# Patient Record
Sex: FEMALE | Race: WHITE | NOT HISPANIC OR LATINO | Employment: FULL TIME | URBAN - METROPOLITAN AREA
[De-identification: names, ages, dates, MRNs, and addresses within clinical notes are randomized per-mention and may not be internally consistent; named-entity substitution may affect disease eponyms.]

---

## 2019-01-01 ENCOUNTER — TRANSFERRED RECORDS (OUTPATIENT)
Dept: MULTI SPECIALTY CLINIC | Facility: CLINIC | Age: 29
End: 2019-01-01

## 2019-01-01 LAB — PAP SMEAR - HIM PATIENT REPORTED: NEGATIVE

## 2019-04-25 ENCOUNTER — EMERGENCY (EMERGENCY)
Facility: HOSPITAL | Age: 29
LOS: 1 days | Discharge: HOME | End: 2019-04-25
Attending: EMERGENCY MEDICINE | Admitting: EMERGENCY MEDICINE
Payer: COMMERCIAL

## 2019-04-25 VITALS
RESPIRATION RATE: 18 BRPM | HEIGHT: 64 IN | HEART RATE: 79 BPM | SYSTOLIC BLOOD PRESSURE: 116 MMHG | DIASTOLIC BLOOD PRESSURE: 64 MMHG | TEMPERATURE: 99 F | WEIGHT: 148.59 LBS | OXYGEN SATURATION: 98 %

## 2019-04-25 VITALS
DIASTOLIC BLOOD PRESSURE: 75 MMHG | TEMPERATURE: 98 F | SYSTOLIC BLOOD PRESSURE: 111 MMHG | OXYGEN SATURATION: 98 % | HEART RATE: 72 BPM | RESPIRATION RATE: 18 BRPM

## 2019-04-25 DIAGNOSIS — Z3A.11 11 WEEKS GESTATION OF PREGNANCY: ICD-10-CM

## 2019-04-25 DIAGNOSIS — Z88.8 ALLERGY STATUS TO OTHER DRUGS, MEDICAMENTS AND BIOLOGICAL SUBSTANCES: ICD-10-CM

## 2019-04-25 DIAGNOSIS — O34.81 MATERNAL CARE FOR OTHER ABNORMALITIES OF PELVIC ORGANS, FIRST TRIMESTER: ICD-10-CM

## 2019-04-25 DIAGNOSIS — R35.0 FREQUENCY OF MICTURITION: ICD-10-CM

## 2019-04-25 DIAGNOSIS — Z98.1 ARTHRODESIS STATUS: Chronic | ICD-10-CM

## 2019-04-25 DIAGNOSIS — M54.9 DORSALGIA, UNSPECIFIED: ICD-10-CM

## 2019-04-25 LAB
ALBUMIN SERPL ELPH-MCNC: 4.5 G/DL — SIGNIFICANT CHANGE UP (ref 3.3–5)
ALP SERPL-CCNC: 49 U/L — SIGNIFICANT CHANGE UP (ref 40–120)
ALT FLD-CCNC: 21 U/L — SIGNIFICANT CHANGE UP (ref 10–45)
ANION GAP SERPL CALC-SCNC: 13 MMOL/L — SIGNIFICANT CHANGE UP (ref 5–17)
APPEARANCE UR: CLEAR — SIGNIFICANT CHANGE UP
AST SERPL-CCNC: 16 U/L — SIGNIFICANT CHANGE UP (ref 10–40)
BASOPHILS # BLD AUTO: 0.07 K/UL — SIGNIFICANT CHANGE UP (ref 0–0.2)
BASOPHILS NFR BLD AUTO: 0.7 % — SIGNIFICANT CHANGE UP (ref 0–2)
BILIRUB SERPL-MCNC: 0.3 MG/DL — SIGNIFICANT CHANGE UP (ref 0.2–1.2)
BILIRUB UR-MCNC: NEGATIVE — SIGNIFICANT CHANGE UP
BUN SERPL-MCNC: 6 MG/DL — LOW (ref 7–23)
CALCIUM SERPL-MCNC: 9.8 MG/DL — SIGNIFICANT CHANGE UP (ref 8.4–10.5)
CHLORIDE SERPL-SCNC: 102 MMOL/L — SIGNIFICANT CHANGE UP (ref 96–108)
CO2 SERPL-SCNC: 23 MMOL/L — SIGNIFICANT CHANGE UP (ref 22–31)
COLOR SPEC: YELLOW — SIGNIFICANT CHANGE UP
CREAT SERPL-MCNC: 0.56 MG/DL — SIGNIFICANT CHANGE UP (ref 0.5–1.3)
DIFF PNL FLD: NEGATIVE — SIGNIFICANT CHANGE UP
EOSINOPHIL # BLD AUTO: 0.45 K/UL — SIGNIFICANT CHANGE UP (ref 0–0.5)
EOSINOPHIL NFR BLD AUTO: 4.3 % — SIGNIFICANT CHANGE UP (ref 0–6)
GLUCOSE SERPL-MCNC: 94 MG/DL — SIGNIFICANT CHANGE UP (ref 70–99)
GLUCOSE UR QL: NEGATIVE — SIGNIFICANT CHANGE UP
HCG SERPL-ACNC: HIGH MIU/ML
HCT VFR BLD CALC: 36.5 % — SIGNIFICANT CHANGE UP (ref 34.5–45)
HGB BLD-MCNC: 12.3 G/DL — SIGNIFICANT CHANGE UP (ref 11.5–15.5)
IMM GRANULOCYTES NFR BLD AUTO: 0.6 % — SIGNIFICANT CHANGE UP (ref 0–1.5)
KETONES UR-MCNC: NEGATIVE — SIGNIFICANT CHANGE UP
LEUKOCYTE ESTERASE UR-ACNC: NEGATIVE — SIGNIFICANT CHANGE UP
LYMPHOCYTES # BLD AUTO: 2.48 K/UL — SIGNIFICANT CHANGE UP (ref 1–3.3)
LYMPHOCYTES # BLD AUTO: 23.7 % — SIGNIFICANT CHANGE UP (ref 13–44)
MCHC RBC-ENTMCNC: 30.5 PG — SIGNIFICANT CHANGE UP (ref 27–34)
MCHC RBC-ENTMCNC: 33.7 GM/DL — SIGNIFICANT CHANGE UP (ref 32–36)
MCV RBC AUTO: 90.6 FL — SIGNIFICANT CHANGE UP (ref 80–100)
MONOCYTES # BLD AUTO: 0.64 K/UL — SIGNIFICANT CHANGE UP (ref 0–0.9)
MONOCYTES NFR BLD AUTO: 6.1 % — SIGNIFICANT CHANGE UP (ref 2–14)
NEUTROPHILS # BLD AUTO: 6.76 K/UL — SIGNIFICANT CHANGE UP (ref 1.8–7.4)
NEUTROPHILS NFR BLD AUTO: 64.6 % — SIGNIFICANT CHANGE UP (ref 43–77)
NITRITE UR-MCNC: NEGATIVE — SIGNIFICANT CHANGE UP
NRBC # BLD: 0 /100 WBCS — SIGNIFICANT CHANGE UP (ref 0–0)
PH UR: 7 — SIGNIFICANT CHANGE UP (ref 5–8)
PLATELET # BLD AUTO: 308 K/UL — SIGNIFICANT CHANGE UP (ref 150–400)
POTASSIUM SERPL-MCNC: 4.1 MMOL/L — SIGNIFICANT CHANGE UP (ref 3.5–5.3)
POTASSIUM SERPL-SCNC: 4.1 MMOL/L — SIGNIFICANT CHANGE UP (ref 3.5–5.3)
PROT SERPL-MCNC: 7.9 G/DL — SIGNIFICANT CHANGE UP (ref 6–8.3)
PROT UR-MCNC: NEGATIVE MG/DL — SIGNIFICANT CHANGE UP
RBC # BLD: 4.03 M/UL — SIGNIFICANT CHANGE UP (ref 3.8–5.2)
RBC # FLD: 12.7 % — SIGNIFICANT CHANGE UP (ref 10.3–14.5)
SODIUM SERPL-SCNC: 138 MMOL/L — SIGNIFICANT CHANGE UP (ref 135–145)
SP GR SPEC: 1.01 — SIGNIFICANT CHANGE UP (ref 1–1.03)
UROBILINOGEN FLD QL: 0.2 E.U./DL — SIGNIFICANT CHANGE UP
WBC # BLD: 10.46 K/UL — SIGNIFICANT CHANGE UP (ref 3.8–10.5)
WBC # FLD AUTO: 10.46 K/UL — SIGNIFICANT CHANGE UP (ref 3.8–10.5)

## 2019-04-25 PROCEDURE — 99284 EMERGENCY DEPT VISIT MOD MDM: CPT | Mod: 25

## 2019-04-25 PROCEDURE — 96375 TX/PRO/DX INJ NEW DRUG ADDON: CPT

## 2019-04-25 PROCEDURE — 36415 COLL VENOUS BLD VENIPUNCTURE: CPT

## 2019-04-25 PROCEDURE — 76801 OB US < 14 WKS SINGLE FETUS: CPT

## 2019-04-25 PROCEDURE — 76817 TRANSVAGINAL US OBSTETRIC: CPT

## 2019-04-25 PROCEDURE — 85025 COMPLETE CBC W/AUTO DIFF WBC: CPT

## 2019-04-25 PROCEDURE — 99285 EMERGENCY DEPT VISIT HI MDM: CPT

## 2019-04-25 PROCEDURE — 80053 COMPREHEN METABOLIC PANEL: CPT

## 2019-04-25 PROCEDURE — 76801 OB US < 14 WKS SINGLE FETUS: CPT | Mod: 26

## 2019-04-25 PROCEDURE — 84702 CHORIONIC GONADOTROPIN TEST: CPT

## 2019-04-25 PROCEDURE — 96376 TX/PRO/DX INJ SAME DRUG ADON: CPT

## 2019-04-25 PROCEDURE — 96374 THER/PROPH/DIAG INJ IV PUSH: CPT

## 2019-04-25 PROCEDURE — 87086 URINE CULTURE/COLONY COUNT: CPT

## 2019-04-25 PROCEDURE — 81003 URINALYSIS AUTO W/O SCOPE: CPT

## 2019-04-25 PROCEDURE — 76817 TRANSVAGINAL US OBSTETRIC: CPT | Mod: 26

## 2019-04-25 RX ORDER — MORPHINE SULFATE 50 MG/1
2 CAPSULE, EXTENDED RELEASE ORAL ONCE
Qty: 0 | Refills: 0 | Status: DISCONTINUED | OUTPATIENT
Start: 2019-04-25 | End: 2019-04-25

## 2019-04-25 RX ORDER — SODIUM CHLORIDE 9 MG/ML
1000 INJECTION INTRAMUSCULAR; INTRAVENOUS; SUBCUTANEOUS ONCE
Qty: 0 | Refills: 0 | Status: COMPLETED | OUTPATIENT
Start: 2019-04-25 | End: 2019-04-25

## 2019-04-25 RX ORDER — ACETAMINOPHEN 500 MG
1000 TABLET ORAL ONCE
Qty: 0 | Refills: 0 | Status: COMPLETED | OUTPATIENT
Start: 2019-04-25 | End: 2019-04-25

## 2019-04-25 RX ORDER — METOCLOPRAMIDE HCL 10 MG
10 TABLET ORAL ONCE
Qty: 0 | Refills: 0 | Status: COMPLETED | OUTPATIENT
Start: 2019-04-25 | End: 2019-04-25

## 2019-04-25 RX ADMIN — SODIUM CHLORIDE 2000 MILLILITER(S): 9 INJECTION INTRAMUSCULAR; INTRAVENOUS; SUBCUTANEOUS at 15:03

## 2019-04-25 RX ADMIN — Medication 400 MILLIGRAM(S): at 15:03

## 2019-04-25 RX ADMIN — MORPHINE SULFATE 2 MILLIGRAM(S): 50 CAPSULE, EXTENDED RELEASE ORAL at 18:55

## 2019-04-25 RX ADMIN — MORPHINE SULFATE 2 MILLIGRAM(S): 50 CAPSULE, EXTENDED RELEASE ORAL at 15:49

## 2019-04-25 RX ADMIN — Medication 104 MILLIGRAM(S): at 15:21

## 2019-04-25 NOTE — ED ADULT NURSE NOTE - OBJECTIVE STATEMENT
Patient came in c/o lower back pain 6/10. Extensive hx including Ehler Danlos Syndrome, endometriosis, and POTS. Patient started c/o increased lower back pain this AM, exacerbated by walking. Endorses nausea/vomiting secondary to morning sickness. Denies vaginal bleeding/cramping.

## 2019-04-25 NOTE — ED ADULT TRIAGE NOTE - CHIEF COMPLAINT QUOTE
G1 ~11 weeks gravid c/o lower back pain. PHX spinal fusion and connective tissue disease. Denies incontinence or paresthesia to BLE

## 2019-04-25 NOTE — ED PROVIDER NOTE - CLINICAL SUMMARY MEDICAL DECISION MAKING FREE TEXT BOX
28 yo  at 11w3d by US assigned JAYSON of 19 presents for evaluation of severe left side back pain and lower abdominal pain for 1 day. Patient reports a nagging cramping pain in her back which started yesterday. Patient reports a history of intermittent back pain secondary to her endometriosis but she has not had back pain in the pregnancy. Patient did not try taking any pain medication at home for this pain. Patient denies fever, chills, chest pain, SOB, abdominal pain, vomiting,  and vaginal bleeding. labs and US done, pain controlled by IV Tylenol  and IV Morphine. Pt seen and evaluted by GYN resident on call. Dc home with Tylenol for pain and  f/u with pain management recommended->pt with PMH etoh and substance abuse, h/o endometriosis and chronic back pain/ s/p spinal fusion.  message left for ER referral coordinator. pt agrees with a plan.

## 2019-04-25 NOTE — ED PROVIDER NOTE - OBJECTIVE STATEMENT
30 yo female , LMP 01/2019, JAYSON 11/11/2019, reports she is 13 weeks pregnant, in the ER c/o severe pain to her lower back and lower abdomen. Pt reports frequent urination and morning sickness for awhile. Denies fever, chills, denies vomiting, denies vaginal bleeding. Pt reports she was seen by her GYN yesterday , but back pain was not so severe. Pt mentioned h/o Ehles-Danlos syndrome, Pots disease, multiple spinal fusion. 28 yo female , LMP 01/2019, JAYSON 11/11/2019, reports she is 11 weeks pregnant, in the ER c/o severe pain to her lower back and lower abdomen. Pt reports frequent urination and morning sickness during this pregnancy, but denies any prior severe  back pain. Denies fever, chills, denies vomiting, denies vaginal bleeding. Pt reports she was seen by her OB/GYN yesterday , but back pain was not so intense.

## 2019-04-25 NOTE — CONSULT NOTE ADULT - SUBJECTIVE AND OBJECTIVE BOX
30 yo  at 11w3d by US assigned JAYSON of 19 presents for evaluation of left side back pain for 1 day. Patient reports a gnagging, cramping pain in her back which started yesterday. She had a normal evaluation in the office. Today, when she was at work, the   Patient denies fever, chills, chest pain, SOB, abdominal pain, nausea, vomiting, vaginal bleeding      OBHx: G1- Current   GYN Hx: endometriosis, no abnormal pap smears, regular OBGYN care at [facility]   PMHx: Kevin Danlos Syndrome Type 3, Postural Orthostatic Tachycardia Syndrome, Spina Bifidia Occulta, Depression, Anxiety, Hiatal Hernia   SHx: Appendectomy after intussusception at age 6, Excision of endometriosis in  with Dr. Bedolla, Spinal fusion for spinal bifidia occulta in , Hip labrum repair   Meds: PNV, Propranolol, Zantac, prozac  Allergies: Iodine contrast    PHYSICAL EXAM:   Vital Signs Last 24 Hrs  T(C): 36.7 (2019 21:25), Max: 37 (2019 13:35)  T(F): 98 (2019 21:25), Max: 98.6 (2019 13:35)  HR: 72 (2019 21:25) (72 - 79)  BP: 111/75 (2019 21:25) (111/75 - 116/64)  BP(mean): --  RR: 18 (2019 21:25) (18 - 18)  SpO2: 98% (2019 21:25) (98% - 98%)    **************************  Constitutional: Alert & Oriented x3, No acute distress  Respiratory: Clear to ausculation bilaterally; no wheezing, rhonchi, or crackles  Cardiovascular: regular rate and rhythm, no murmurs, or gallops  Gastrointestinal: soft, non tender, positive bowel sounds, no rebound or guarding   Pelvic exam:   Extremities: no calf tenderness or swelling      LABS:                        12.3   10.46 )-----------( 308      ( 2019 15:07 )             36.5     04-    138  |  102  |  6<L>  ----------------------------<  94  4.1   |  23  |  0.56    Ca    9.8      2019 15:07    TPro  7.9  /  Alb  4.5  /  TBili  0.3  /  DBili  x   /  AST  16  /  ALT  21  /  AlkPhos  49        Urinalysis Basic - ( 2019 14:46 )    Color: Yellow / Appearance: Clear / S.010 / pH: x  Gluc: x / Ketone: NEGATIVE  / Bili: Negative / Urobili: 0.2 E.U./dL   Blood: x / Protein: NEGATIVE mg/dL / Nitrite: NEGATIVE   Leuk Esterase: NEGATIVE / RBC: x / WBC x   Sq Epi: x / Non Sq Epi: x / Bacteria: x      HCG Quantitative, Serum: 43686 mIU/mL ( @ 15:07)      RADIOLOGY & ADDITIONAL STUDIES: 30 yo  at 11w3d by US assigned JAYSON of 19 presents for evaluation of left side back pain for 1 day. Patient reports a nagging cramping pain in her back which started yesterday. She had a normal evaluation in the office. Today, when she was at work, the pain acutely worsened, causing her to double over in pain and prompting her to present for evaluation. Patient reports a history of intermittent back pain secondary to her endometriosis but she has not had back pain in the pregnancy. Patient did not try taking any pain medication at home for this pain. Patient denies fever, chills, chest pain, SOB, abdominal pain, vomiting,  and vaginal bleeding.    OBHx: G1- Current   GYN Hx: endometriosis, no abnormal pap smears, regular OBGYN care at [facility]   PMHx: Kevin Danlos Syndrome Type 3, Postural Orthostatic Tachycardia Syndrome, Spina Bifidia Occulta, Depression, Anxiety, Hiatal Hernia   SHx: Appendectomy after intussusception at age 6, Excision of endometriosis in  with Dr. Bedolla, Spinal fusion for spinal bifidia occulta in , Hip labrum repair   Meds: PNV, Propranolol, Zantac, prozac  Allergies: Iodine contrast    PHYSICAL EXAM:   Vital Signs Last 24 Hrs  T(C): 36.7 (2019 21:25), Max: 37 (2019 13:35)  T(F): 98 (2019 21:25), Max: 98.6 (2019 13:35)  HR: 72 (2019 21:25) (72 - 79)  BP: 111/75 (2019 21:25) (111/75 - 116/64)  BP(mean): --  RR: 18 (2019 21:25) (18 - 18)  SpO2: 98% (2019 21:25) (98% - 98%)    **************************  Constitutional: Alert & Oriented x3, No acute distress  Gastrointestinal: soft, non tender, positive bowel sounds, no rebound or guarding, tender to palpation in the left paraspinal muscles   Pelvic exam: normal appearing external female genitalia, normal vagina, normal cervix, no lesions noted, no vaginal bleeding, no abnormal discharge, no CMT, no suprapubic tenderness       LABS:                        12.3   10.46 )-----------( 308      ( 2019 15:07 )             36.5         138  |  102  |  6<L>  ----------------------------<  94  4.1   |  23  |  0.56    Ca    9.8      2019 15:07    TPro  7.9  /  Alb  4.5  /  TBili  0.3  /  DBili  x   /  AST  16  /  ALT  21  /  AlkPhos  49        Urinalysis Basic - ( 2019 14:46 )    Color: Yellow / Appearance: Clear / S.010 / pH: x  Gluc: x / Ketone: NEGATIVE  / Bili: Negative / Urobili: 0.2 E.U./dL   Blood: x / Protein: NEGATIVE mg/dL / Nitrite: NEGATIVE   Leuk Esterase: NEGATIVE / RBC: x / WBC x   Sq Epi: x / Non Sq Epi: x / Bacteria: x      HCG Quantitative, Serum: 32448 mIU/mL ( @ 15:07)      RADIOLOGY & ADDITIONAL STUDIES:     EXAM:  US OB LES THAN 14 WKS 1ST GEST                          EXAM:  US OB TRANSVAGINAL                          PROCEDURE DATE:  2019    INTERPRETATION:  OBSTETRICAL ULTRASOUND - SECOND TRIMESTER dated   2019 5:23 PM    INDICATION: 29 years Female with second trimester pregnancy by dates,   lower back pain and pelvic pain. LMP: 1/15/2019    TECHNIQUE: Transabdominal views of the pelvis were obtained. Transvaginal   views were obtained for better visualization of the endometrial cavity.    PRIOR STUDIES: None.    FINDINGS:   By dates, the estimated gestational age is 14 weeks 2 days.      A single intrauterine gestation is visible with an average ultrasound age   of 11 weeks 1 day.   Mean sac diameter is 4.3 cm, corresponding to a gestational age of 10   weeks 0 days.  Crown-rump length is 5.5 cm, corresponding to gestational age of 12 weeks   1 day.    Fetal cardiac motion is visible with fetal heart rate of 149 beats per   minute.  A yolk sac is visible with internal diameter 0.3 cm, which is normal.    No subchorionic bleed is visible. The cervix is closed with a length of   3.7 cm.    The uterus is retroverted. The uterus is 13.0 x 7.7 x 8.2 cm. No   myometrial abnormalities are seen.     The right ovary is normal in size, measuring 3.5 x 1.5 x 2.1 cm. No right   ovarian masses are seen. The left ovary is enlarged, measuring 4.1 x 3.3   x 6.9 cm. This includes a 3.6 x 3.4 x 6.4 complex cyst containing thin   avascular internal septations.     Doppler evaluation demonstrates arterial and venous flow to both ovaries   with no evidence of torsion.      IMPRESSION:   1.  Single live intrauterine gestation with an average ultrasound age of   11 weeks 1 day.  2.  6.4 cm complex left ovarian cyst containing a few thin internal   septations. There is no evidence of adnexal torsion at this time.   Intermittent torsion in the appropriate clinical setting cannot be   excluded. Interval follow-up imaging is suggested to confirm resolution.    "Thank you for the opportunity to participate in the care of this   patient."    YESENIA MUNGUIA M.D., RADIOLOGY RESIDENT  This document has been electronically signed.  REGINALD POLANCO M.D., ATTENDING RADIOLOGIST  This document has been electronically signed. 2019  7:28PM

## 2019-04-25 NOTE — ED PROVIDER NOTE - CARE PROVIDER_API CALL
Asmita Monroy)  Obstetrics and Gynecology  [address] [place] [address]  Phone: [phone]  Fax: [phone]  Follow Up Time:

## 2019-04-25 NOTE — ED PROVIDER NOTE - NSFOLLOWUPINSTRUCTIONS_ED_ALL_ED_FT
I have discussed the discharge plan with the patient. The patient agrees with the plan, as discussed.  The patient understands Emergency Department diagnosis is a preliminary diagnosis often based on limited information and that the patient must adhere to the follow-up plan as discussed.  The patient understands that if the symptoms worsen or if prescribed medications do not have the desired/planned effect that the patient may return to the Emergency Department at any time for further evaluation and treatment.      Ovarian Cyst  An ovarian cyst is a fluid-filled sac that forms on an ovary. The ovaries are small organs that produce eggs in women. Various types of cysts can form on the ovaries. Some may cause symptoms and require treatment. Most ovarian cysts go away on their own, are not cancerous (are benign), and do not cause problems.    Common types of ovarian cysts include:    Functional (follicle) cysts.    Occur during the menstrual cycle, and usually go away with the next menstrual cycle if you do not get pregnant.  Usually cause no symptoms.    Endometriomas.    Are cysts that form from the tissue that lines the uterus (endometrium).  Are sometimes called “chocolate cysts” because they become filled with blood that turns brown.  Can cause pain in the lower abdomen during intercourse and during your period.    Cystadenoma cysts.    Develop from cells on the outside surface of the ovary.  Can get very large and cause lower abdomen pain and pain with intercourse.  Can cause severe pain if they twist or break open (rupture).    Dermoid cysts.    Are sometimes found in both ovaries.  May contain different kinds of body tissue, such as skin, teeth, hair, or cartilage.  Usually do not cause symptoms unless they get very big.    Theca lutein cysts.    Occur when too much of a certain hormone (human chorionic gonadotropin) is produced and overstimulates the ovaries to produce an egg.  Are most common after having procedures used to assist with the conception of a baby (in vitro fertilization).      What are the causes?  Ovarian cysts may be caused by:    Ovarian hyperstimulation syndrome. This is a condition that can develop from taking fertility medicines. It causes multiple large ovarian cysts to form.  Polycystic ovarian syndrome (PCOS). This is a common hormonal disorder that can cause ovarian cysts, as well as problems with your period or fertility.    What increases the risk?  The following factors may make you more likely to develop ovarian cysts:    Being overweight or obese.  Taking fertility medicines.  Taking certain forms of hormonal birth control.  Smoking.    What are the signs or symptoms?  Many ovarian cysts do not cause symptoms. If symptoms are present, they may include:    Pelvic pain or pressure.  Pain in the lower abdomen.  Pain during sex.  Abdominal swelling.  Abnormal menstrual periods.  Increasing pain with menstrual periods.    How is this diagnosed?  These cysts are commonly found during a routine pelvic exam. You may have tests to find out more about the cyst, such as:    Ultrasound.  X-ray of the pelvis.  CT scan.  MRI.  Blood tests.    How is this treated?  Many ovarian cysts go away on their own without treatment. Your health care provider may want to check your cyst regularly for 2–3 months to see if it changes. If you are in menopause, it is especially important to have your cyst monitored closely because menopausal women have a higher rate of ovarian cancer.    When treatment is needed, it may include:    Medicines to help relieve pain.  A procedure to drain the cyst (aspiration).  Surgery to remove the whole cyst.  Hormone treatment or birth control pills. These methods are sometimes used to help dissolve a cyst.    Follow these instructions at home:  Take over-the-counter and prescription medicines only as told by your health care provider.  Do not drive or use heavy machinery while taking prescription pain medicine.  Get regular pelvic exams and Pap tests as often as told by your health care provider.  Return to your normal activities as told by your health care provider. Ask your health care provider what activities are safe for you.  Do not use any products that contain nicotine or tobacco, such as cigarettes and e-cigarettes. If you need help quitting, ask your health care provider.  Keep all follow-up visits as told by your health care provider. This is important.  Contact a health care provider if:  Your periods are late, irregular, or painful, or they stop.  You have pelvic pain that does not go away.  You have pressure on your bladder or trouble emptying your bladder completely.  You have pain during sex.  You have any of the following in your abdomen:    A feeling of fullness.  Pressure.  Discomfort.  Pain that does not go away.  Swelling.    You feel generally ill.  You become constipated.  You lose your appetite.  You develop severe acne.  You start to have more body hair and facial hair.  You are gaining weight or losing weight without changing your exercise and eating habits.  You think you may be pregnant.  Get help right away if:  You have abdominal pain that is severe or gets worse.  You cannot eat or drink without vomiting.  You suddenly develop a fever.  Your menstrual period is much heavier than usual.  This information is not intended to replace advice given to you by your health care provider. Make sure you discuss any questions you have with your health care provider.    ArabicBosnianCanajosephan AkiEv[facility] HospitaljrTerrebon[facility] Medical CenterianSDavis Hospital and Medical CenterogTraditional ChineseVietRegional Hospital for [facility]    First Trimester of Pregnancy  The first trimester of pregnancy is from week 1 until the end of week 13 (months 1 through 3). A week after a sperm fertilizes an egg, the egg will implant on the wall of the uterus. This embryo will begin to develop into a baby. Genes from you and your partner will form the baby. The male genes will determine whether the baby will be a boy or a girl. At 6–8 weeks, the eyes and face will be formed, and the heartbeat can be seen on ultrasound. At the end of 12 weeks, all the baby's organs will be formed.    Now that you are pregnant, you will want to do everything you can to have a healthy baby. Two of the most important things are to get good prenatal care and to follow your health care provider's instructions. Prenatal care is all the medical care you receive before the baby's birth. This care will help prevent, find, and treat any problems during the pregnancy and childbirth.    Body changes during your first trimester  Your body goes through many changes during pregnancy. The changes vary from woman to woman.    You may gain or lose a couple of pounds at first.  You may feel sick to your stomach (nauseous) and you may throw up (vomit). If the vomiting is uncontrollable, call your health care provider.  You may tire easily.  You may develop headaches that can be relieved by medicines. All medicines should be approved by your health care provider.  You may urinate more often. Painful urination may mean you have a bladder infection.  You may develop heartburn as a result of your pregnancy.  You may develop constipation because certain hormones are causing the muscles that push stool through your intestines to slow down.  You may develop hemorrhoids or swollen veins (varicose veins).  Your breasts may begin to grow larger and become tender. Your nipples may stick out more, and the tissue that surrounds them (areola) may become darker.  Your gums may bleed and may be sensitive to brushing and flossing.  Dark spots or blotches (chloasma, mask of pregnancy) may develop on your face. This will likely fade after the baby is born.  Your menstrual periods will stop.  You may have a loss of appetite.  You may develop cravings for certain kinds of food.  You may have changes in your emotions from day to day, such as being excited to be pregnant or being concerned that something may go wrong with the pregnancy and baby.  You may have more vivid and strange dreams.  You may have changes in your hair. These can include thickening of your hair, rapid growth, and changes in texture. Some women also have hair loss during or after pregnancy, or hair that feels dry or thin. Your hair will most likely return to normal after your baby is born.    What to expect at prenatal visits  During a routine prenatal visit:    You will be weighed to make sure you and the baby are growing normally.  Your blood pressure will be taken.  Your abdomen will be measured to track your baby's growth.  The fetal heartbeat will be listened to between weeks 10 and 14 of your pregnancy.  Test results from any previous visits will be discussed.    Your health care provider may ask you:    How you are feeling.  If you are feeling the baby move.  If you have had any abnormal symptoms, such as leaking fluid, bleeding, severe headaches, or abdominal cramping.  If you are using any tobacco products, including cigarettes, chewing tobacco, and electronic cigarettes.  If you have any questions.  Other tests that may be performed during your first trimester include:    Blood tests to find your blood type and to check for the presence of any previous infections. The tests will also be used to check for low iron levels (anemia) and protein on red blood cells (Rh antibodies). Depending on your risk factors, or if you previously had diabetes during pregnancy, you may have tests to check for high blood sugar that affects pregnant women (gestational diabetes).  Urine tests to check for infections, diabetes, or protein in the urine.  An ultrasound to confirm the proper growth and development of the baby.  Fetal screens for spinal cord problems (spina bifida) and Down syndrome.  HIV (human immunodeficiency virus) testing. Routine prenatal testing includes screening for HIV, unless you choose not to have this test.  You may need other tests to make sure you and the baby are doing well.    Follow these instructions at home:  Medicines     Follow your health care provider's instructions regarding medicine use. Specific medicines may be either safe or unsafe to take during pregnancy.  Take a prenatal vitamin that contains at least 600 micrograms (mcg) of folic acid.  If you develop constipation, try taking a stool softener if your health care provider approves.  Eating and drinking     Eat a balanced diet that includes fresh fruits and vegetables, whole grains, good sources of protein such as meat, eggs, or tofu, and low-fat dairy. Your health care provider will help you determine the amount of weight gain that is right for you.  Avoid raw meat and uncooked cheese. These carry germs that can cause birth defects in the baby.  Eating four or five small meals rather than three large meals a day may help relieve nausea and vomiting. If you start to feel nauseous, eating a few soda crackers can be helpful. Drinking liquids between meals, instead of during meals, also seems to help ease nausea and vomiting.  Limit foods that are high in fat and processed sugars, such as fried and sweet foods.  To prevent constipation:    Eat foods that are high in fiber, such as fresh fruits and vegetables, whole grains, and beans.  Drink enough fluid to keep your urine clear or pale yellow.    Activity     Exercise only as directed by your health care provider. Most women can continue their usual exercise routine during pregnancy. Try to exercise for 30 minutes at least 5 days a week. Exercising will help you:    Control your weight.  Stay in shape.  Be prepared for labor and delivery.    Experiencing pain or cramping in the lower abdomen or lower back is a good sign that you should stop exercising. Check with your health care provider before continuing with normal exercises.  Try to avoid standing for long periods of time. Move your legs often if you must  one place for a long time.  Avoid heavy lifting.  Wear low-heeled shoes and practice good posture.  You may continue to have sex unless your health care provider tells you not to.  Relieving pain and discomfort     Wear a good support bra to relieve breast tenderness.  Take warm sitz baths to soothe any pain or discomfort caused by hemorrhoids. Use hemorrhoid cream if your health care provider approves.  Rest with your legs elevated if you have leg cramps or low back pain.  If you develop varicose veins in your legs, wear support hose. Elevate your feet for 15 minutes, 3–4 times a day. Limit salt in your diet.  Prenatal care     Schedule your prenatal visits by the twelfth week of pregnancy. They are usually scheduled monthly at first, then more often in the last 2 months before delivery.  Write down your questions. Take them to your prenatal visits.  Keep all your prenatal visits as told by your health care provider. This is important.  Safety     Wear your seat belt at all times when driving.  Make a list of emergency phone numbers, including numbers for family, friends, the hospital, and police and fire departments.  General instructions     Ask your health care provider for a referral to a local prenatal education class. Begin classes no later than the beginning of month 6 of your pregnancy.  Ask for help if you have counseling or nutritional needs during pregnancy. Your health care provider can offer advice or refer you to specialists for help with various needs.  Do not use hot tubs, steam rooms, or saunas.  Do not douche or use tampons or scented sanitary pads.  Do not cross your legs for long periods of time.  Avoid cat litter boxes and soil used by cats. These carry germs that can cause birth defects in the baby and possibly loss of the fetus by miscarriage or stillbirth.  Avoid all smoking, herbs, alcohol, and medicines not prescribed by your health care provider. Chemicals in these products affect the formation and growth of the baby.  Do not use any products that contain nicotine or tobacco, such as cigarettes and e-cigarettes. If you need help quitting, ask your health care provider. You may receive counseling support and other resources to help you quit.  Schedule a dentist appointment. At home, brush your teeth with a soft toothbrush and be gentle when you floss.  Contact a health care provider if:  You have dizziness.  You have mild pelvic cramps, pelvic pressure, or nagging pain in the abdominal area.  You have persistent nausea, vomiting, or diarrhea.  You have a bad smelling vaginal discharge.  You have pain when you urinate.  You notice increased swelling in your face, hands, legs, or ankles.  You are exposed to fifth disease or chickenpox.  You are exposed to Syriac measles (rubella) and have never had it.  Get help right away if:  You have a fever.  You are leaking fluid from your vagina.  You have spotting or bleeding from your vagina.  You have severe abdominal cramping or pain.  You have rapid weight gain or loss.  You vomit blood or material that looks like coffee grounds.  You develop a severe headache.  You have shortness of breath.  You have any kind of trauma, such as from a fall or a car accident.  Summary  The first trimester of pregnancy is from week 1 until the end of week 13 (months 1 through 3).  Your body goes through many changes during pregnancy. The changes vary from woman to woman.  You will have routine prenatal visits. During those visits, your health care provider will examine you, discuss any test results you may have, and talk with you about how you are feeling.  This information is not intended to replace advice given to you by your health care provider. Make sure you discuss any questions you have with your health care provider.

## 2019-04-25 NOTE — ED ADULT NURSE NOTE - NSIMPLEMENTINTERV_GEN_ALL_ED
Implemented All Fall with Harm Risk Interventions:  [place] to call system. Call bell, personal items and telephone within reach. Instruct patient to call for assistance. Room bathroom lighting operational. Non-slip footwear when patient is off stretcher. Physically safe environment: no spills, clutter or unnecessary equipment. Stretcher in lowest position, wheels locked, appropriate side rails in place. Provide visual cue, wrist band, yellow gown, etc. Monitor gait and stability. Monitor for mental status changes and reorient to person, place, and time. Review medications for side effects contributing to fall risk. Reinforce activity limits and safety measures with patient and family. Provide visual clues: red socks. Implemented All Fall Risk Interventions:  [place] to call system. Call bell, personal items and telephone within reach. Instruct patient to call for assistance. Room bathroom lighting operational. Non-slip footwear when patient is off stretcher. Physically safe environment: no spills, clutter or unnecessary equipment. Stretcher in lowest position, wheels locked, appropriate side rails in place. Provide visual cue, wrist band, yellow gown, etc. Monitor gait and stability. Monitor for mental status changes and reorient to person, place, and time. Review medications for side effects contributing to fall risk. Reinforce activity limits and safety measures with patient and family.

## 2019-04-25 NOTE — ED PROVIDER NOTE - ATTENDING CONTRIBUTION TO CARE
29y female 11wk gestation reports lower back/abd pain, urinary frequency, nausea. HDS. SOno shows (+)IUP as well as large ovarian cyst likely causing her pain. Gyn consulted, recommend supportive care at home, tylenol, return for any concerns.

## 2019-04-25 NOTE — CONSULT NOTE ADULT - ASSESSMENT
30 yo  at 11w3d by US assigned JAYSON of 19 presents for evaluation of left side back pain for 1 day.   - No acute gyn intervention needed at this time.   - Back pain likely secondary to ovarian cyst noted on right ovary - likely a hemorrhagic corpus luteum. Patient counseled regarding transient nature of pain secondary to this kind of cyst.   - Normal vital signs and labs.   - Recommend supportive care with Tylenol and rest.   - Patient requesting medication stronger than Tylenol as she has not previously gotten relief from Tylenol. Given patient's multiple medical comorbidities and her benign exam, will refer patient to Pain management for further evaluation.   - Strict return precautions given: severe pain, heavy vaginal bleeding and leaking of fluid.    - Patient to follow up in the office in 1 week at 1060 O[facility].       Patient discussed with Dr. Monroy - attending on call.

## 2019-04-26 LAB
CULTURE RESULTS: NO GROWTH — SIGNIFICANT CHANGE UP
CULTURE RESULTS: SIGNIFICANT CHANGE UP
SPECIMEN SOURCE: SIGNIFICANT CHANGE UP

## 2019-08-02 ENCOUNTER — OUTPATIENT (OUTPATIENT)
Dept: OUTPATIENT SERVICES | Facility: HOSPITAL | Age: 29
LOS: 1 days | End: 2019-08-02
Payer: COMMERCIAL

## 2019-08-02 DIAGNOSIS — Z98.1 ARTHRODESIS STATUS: Chronic | ICD-10-CM

## 2019-08-02 DIAGNOSIS — O26.899 OTHER SPECIFIED PREGNANCY RELATED CONDITIONS, UNSPECIFIED TRIMESTER: ICD-10-CM

## 2019-08-02 DIAGNOSIS — Z3A.00 WEEKS OF GESTATION OF PREGNANCY NOT SPECIFIED: ICD-10-CM

## 2019-08-02 LAB
ALBUMIN SERPL ELPH-MCNC: 3.7 G/DL — SIGNIFICANT CHANGE UP (ref 3.3–5)
ALP SERPL-CCNC: 60 U/L — SIGNIFICANT CHANGE UP (ref 40–120)
ALT FLD-CCNC: 17 U/L — SIGNIFICANT CHANGE UP (ref 10–45)
AMYLASE P1 CFR SERPL: 63 U/L — SIGNIFICANT CHANGE UP (ref 25–125)
ANION GAP SERPL CALC-SCNC: 13 MMOL/L — SIGNIFICANT CHANGE UP (ref 5–17)
APPEARANCE UR: CLEAR — SIGNIFICANT CHANGE UP
AST SERPL-CCNC: 15 U/L — SIGNIFICANT CHANGE UP (ref 10–40)
BILIRUB SERPL-MCNC: <0.2 MG/DL — SIGNIFICANT CHANGE UP (ref 0.2–1.2)
BILIRUB UR-MCNC: NEGATIVE — SIGNIFICANT CHANGE UP
BUN SERPL-MCNC: 7 MG/DL — SIGNIFICANT CHANGE UP (ref 7–23)
CALCIUM SERPL-MCNC: 8.7 MG/DL — SIGNIFICANT CHANGE UP (ref 8.4–10.5)
CHLORIDE SERPL-SCNC: 102 MMOL/L — SIGNIFICANT CHANGE UP (ref 96–108)
CO2 SERPL-SCNC: 21 MMOL/L — LOW (ref 22–31)
COLOR SPEC: YELLOW — SIGNIFICANT CHANGE UP
CREAT SERPL-MCNC: 0.46 MG/DL — LOW (ref 0.5–1.3)
DIFF PNL FLD: NEGATIVE — SIGNIFICANT CHANGE UP
GLUCOSE SERPL-MCNC: 96 MG/DL — SIGNIFICANT CHANGE UP (ref 70–99)
GLUCOSE UR QL: NEGATIVE — SIGNIFICANT CHANGE UP
HCT VFR BLD CALC: 32.7 % — LOW (ref 34.5–45)
HGB BLD-MCNC: 10.9 G/DL — LOW (ref 11.5–15.5)
KETONES UR-MCNC: NEGATIVE — SIGNIFICANT CHANGE UP
LEUKOCYTE ESTERASE UR-ACNC: NEGATIVE — SIGNIFICANT CHANGE UP
LIDOCAIN IGE QN: 28 U/L — SIGNIFICANT CHANGE UP (ref 7–60)
MCHC RBC-ENTMCNC: 30.4 PG — SIGNIFICANT CHANGE UP (ref 27–34)
MCHC RBC-ENTMCNC: 33.3 GM/DL — SIGNIFICANT CHANGE UP (ref 32–36)
MCV RBC AUTO: 91.1 FL — SIGNIFICANT CHANGE UP (ref 80–100)
NITRITE UR-MCNC: NEGATIVE — SIGNIFICANT CHANGE UP
NRBC # BLD: 0 /100 WBCS — SIGNIFICANT CHANGE UP (ref 0–0)
PH UR: 6.5 — SIGNIFICANT CHANGE UP (ref 5–8)
PLATELET # BLD AUTO: 309 K/UL — SIGNIFICANT CHANGE UP (ref 150–400)
POTASSIUM SERPL-MCNC: 3.7 MMOL/L — SIGNIFICANT CHANGE UP (ref 3.5–5.3)
POTASSIUM SERPL-SCNC: 3.7 MMOL/L — SIGNIFICANT CHANGE UP (ref 3.5–5.3)
PROT SERPL-MCNC: 6.9 G/DL — SIGNIFICANT CHANGE UP (ref 6–8.3)
PROT UR-MCNC: ABNORMAL MG/DL
RBC # BLD: 3.59 M/UL — LOW (ref 3.8–5.2)
RBC # FLD: 12.5 % — SIGNIFICANT CHANGE UP (ref 10.3–14.5)
SODIUM SERPL-SCNC: 136 MMOL/L — SIGNIFICANT CHANGE UP (ref 135–145)
SP GR SPEC: 1.01 — SIGNIFICANT CHANGE UP (ref 1–1.03)
UROBILINOGEN FLD QL: 0.2 E.U./DL — SIGNIFICANT CHANGE UP
WBC # BLD: 13.44 K/UL — HIGH (ref 3.8–10.5)
WBC # FLD AUTO: 13.44 K/UL — HIGH (ref 3.8–10.5)

## 2019-08-02 PROCEDURE — 85027 COMPLETE CBC AUTOMATED: CPT

## 2019-08-02 PROCEDURE — 87086 URINE CULTURE/COLONY COUNT: CPT

## 2019-08-02 PROCEDURE — 81001 URINALYSIS AUTO W/SCOPE: CPT

## 2019-08-02 PROCEDURE — 80053 COMPREHEN METABOLIC PANEL: CPT

## 2019-08-02 PROCEDURE — 99214 OFFICE O/P EST MOD 30 MIN: CPT

## 2019-08-02 PROCEDURE — 36415 COLL VENOUS BLD VENIPUNCTURE: CPT

## 2019-08-02 PROCEDURE — 82150 ASSAY OF AMYLASE: CPT

## 2019-08-02 PROCEDURE — 83690 ASSAY OF LIPASE: CPT

## 2019-08-02 RX ORDER — ACETAMINOPHEN 500 MG
650 TABLET ORAL ONCE
Refills: 0 | Status: DISCONTINUED | OUTPATIENT
Start: 2019-08-02 | End: 2019-08-29

## 2019-08-02 RX ORDER — SODIUM CHLORIDE 9 MG/ML
1000 INJECTION, SOLUTION INTRAVENOUS ONCE
Refills: 0 | Status: COMPLETED | OUTPATIENT
Start: 2019-08-02 | End: 2019-08-02

## 2019-08-02 RX ORDER — ACETAMINOPHEN 500 MG
1000 TABLET ORAL ONCE
Refills: 0 | Status: COMPLETED | OUTPATIENT
Start: 2019-08-02 | End: 2019-08-02

## 2019-08-02 RX ADMIN — SODIUM CHLORIDE 1000 MILLILITER(S): 9 INJECTION, SOLUTION INTRAVENOUS at 19:09

## 2019-08-02 RX ADMIN — Medication 400 MILLIGRAM(S): at 19:09

## 2019-08-03 PROBLEM — Q79.6 EHLERS-DANLOS SYNDROMES: Chronic | Status: ACTIVE | Noted: 2019-04-25

## 2019-08-04 LAB
CULTURE RESULTS: SIGNIFICANT CHANGE UP
SPECIMEN SOURCE: SIGNIFICANT CHANGE UP

## 2019-09-05 ENCOUNTER — OUTPATIENT (OUTPATIENT)
Dept: OUTPATIENT SERVICES | Facility: HOSPITAL | Age: 29
LOS: 1 days | End: 2019-09-05
Payer: COMMERCIAL

## 2019-09-05 DIAGNOSIS — O26.899 OTHER SPECIFIED PREGNANCY RELATED CONDITIONS, UNSPECIFIED TRIMESTER: ICD-10-CM

## 2019-09-05 DIAGNOSIS — Z3A.00 WEEKS OF GESTATION OF PREGNANCY NOT SPECIFIED: ICD-10-CM

## 2019-09-05 DIAGNOSIS — Z98.1 ARTHRODESIS STATUS: Chronic | ICD-10-CM

## 2019-09-05 LAB
APPEARANCE UR: ABNORMAL
BILIRUB UR-MCNC: NEGATIVE — SIGNIFICANT CHANGE UP
COLOR SPEC: YELLOW — SIGNIFICANT CHANGE UP
DIFF PNL FLD: NEGATIVE — SIGNIFICANT CHANGE UP
GLUCOSE UR QL: NEGATIVE — SIGNIFICANT CHANGE UP
KETONES UR-MCNC: NEGATIVE — SIGNIFICANT CHANGE UP
LEUKOCYTE ESTERASE UR-ACNC: ABNORMAL
NITRITE UR-MCNC: NEGATIVE — SIGNIFICANT CHANGE UP
PH UR: 8 — SIGNIFICANT CHANGE UP (ref 5–8)
PROT UR-MCNC: NEGATIVE MG/DL — SIGNIFICANT CHANGE UP
SP GR SPEC: 1.01 — SIGNIFICANT CHANGE UP (ref 1–1.03)
UROBILINOGEN FLD QL: 0.2 E.U./DL — SIGNIFICANT CHANGE UP

## 2019-09-05 PROCEDURE — 99214 OFFICE O/P EST MOD 30 MIN: CPT

## 2019-09-05 PROCEDURE — 81001 URINALYSIS AUTO W/SCOPE: CPT

## 2019-09-26 ENCOUNTER — OUTPATIENT (OUTPATIENT)
Dept: OUTPATIENT SERVICES | Facility: HOSPITAL | Age: 29
LOS: 1 days | End: 2019-09-26
Payer: COMMERCIAL

## 2019-09-26 DIAGNOSIS — O26.899 OTHER SPECIFIED PREGNANCY RELATED CONDITIONS, UNSPECIFIED TRIMESTER: ICD-10-CM

## 2019-09-26 DIAGNOSIS — Z3A.00 WEEKS OF GESTATION OF PREGNANCY NOT SPECIFIED: ICD-10-CM

## 2019-09-26 DIAGNOSIS — Z98.1 ARTHRODESIS STATUS: Chronic | ICD-10-CM

## 2019-09-26 PROCEDURE — 99214 OFFICE O/P EST MOD 30 MIN: CPT

## 2019-10-24 ENCOUNTER — OUTPATIENT (OUTPATIENT)
Dept: OUTPATIENT SERVICES | Facility: HOSPITAL | Age: 29
LOS: 1 days | End: 2019-10-24
Payer: COMMERCIAL

## 2019-10-24 DIAGNOSIS — O26.899 OTHER SPECIFIED PREGNANCY RELATED CONDITIONS, UNSPECIFIED TRIMESTER: ICD-10-CM

## 2019-10-24 DIAGNOSIS — Z3A.00 WEEKS OF GESTATION OF PREGNANCY NOT SPECIFIED: ICD-10-CM

## 2019-10-24 DIAGNOSIS — Z98.1 ARTHRODESIS STATUS: Chronic | ICD-10-CM

## 2019-10-24 LAB — AMNISURE ROM (RUPTURE OF MEMBRANES): NEGATIVE — SIGNIFICANT CHANGE UP

## 2019-10-24 PROCEDURE — 99214 OFFICE O/P EST MOD 30 MIN: CPT

## 2019-10-24 PROCEDURE — 76818 FETAL BIOPHYS PROFILE W/NST: CPT

## 2019-10-24 PROCEDURE — 84112 EVAL AMNIOTIC FLUID PROTEIN: CPT

## 2019-11-02 ENCOUNTER — INPATIENT (INPATIENT)
Facility: HOSPITAL | Age: 29
LOS: 4 days | Discharge: HOME | End: 2019-11-07
Attending: OBSTETRICS & GYNECOLOGY | Admitting: OBSTETRICS & GYNECOLOGY
Payer: COMMERCIAL

## 2019-11-02 DIAGNOSIS — Z3A.00 WEEKS OF GESTATION OF PREGNANCY NOT SPECIFIED: ICD-10-CM

## 2019-11-02 DIAGNOSIS — Z98.1 ARTHRODESIS STATUS: Chronic | ICD-10-CM

## 2019-11-02 DIAGNOSIS — O26.899 OTHER SPECIFIED PREGNANCY RELATED CONDITIONS, UNSPECIFIED TRIMESTER: ICD-10-CM

## 2019-11-03 VITALS — WEIGHT: 196.21 LBS | HEIGHT: 64 IN

## 2019-11-03 LAB
HCT VFR BLD CALC: 38.6 % — SIGNIFICANT CHANGE UP (ref 34.5–45)
HGB BLD-MCNC: 12.8 G/DL — SIGNIFICANT CHANGE UP (ref 11.5–15.5)
MCHC RBC-ENTMCNC: 29.4 PG — SIGNIFICANT CHANGE UP (ref 27–34)
MCHC RBC-ENTMCNC: 33.2 GM/DL — SIGNIFICANT CHANGE UP (ref 32–36)
MCV RBC AUTO: 88.5 FL — SIGNIFICANT CHANGE UP (ref 80–100)
NRBC # BLD: 0 /100 WBCS — SIGNIFICANT CHANGE UP (ref 0–0)
PLATELET # BLD AUTO: 274 K/UL — SIGNIFICANT CHANGE UP (ref 150–400)
RBC # BLD: 4.36 M/UL — SIGNIFICANT CHANGE UP (ref 3.8–5.2)
RBC # FLD: 13.6 % — SIGNIFICANT CHANGE UP (ref 10.3–14.5)
RH IG SCN BLD-IMP: POSITIVE — SIGNIFICANT CHANGE UP
WBC # BLD: 15.01 K/UL — HIGH (ref 3.8–10.5)
WBC # FLD AUTO: 15.01 K/UL — HIGH (ref 3.8–10.5)

## 2019-11-03 RX ORDER — MORPHINE SULFATE 50 MG/1
4 CAPSULE, EXTENDED RELEASE ORAL ONCE
Refills: 0 | Status: DISCONTINUED | OUTPATIENT
Start: 2019-11-03 | End: 2019-11-03

## 2019-11-03 RX ORDER — CITRIC ACID/SODIUM CITRATE 300-500 MG
15 SOLUTION, ORAL ORAL EVERY 6 HOURS
Refills: 0 | Status: DISCONTINUED | OUTPATIENT
Start: 2019-11-03 | End: 2019-11-04

## 2019-11-03 RX ORDER — OXYTOCIN 10 UNIT/ML
333.33 VIAL (ML) INJECTION
Qty: 20 | Refills: 0 | Status: DISCONTINUED | OUTPATIENT
Start: 2019-11-03 | End: 2019-11-04

## 2019-11-03 RX ORDER — SODIUM CHLORIDE 9 MG/ML
1000 INJECTION, SOLUTION INTRAVENOUS
Refills: 0 | Status: DISCONTINUED | OUTPATIENT
Start: 2019-11-03 | End: 2019-11-04

## 2019-11-03 RX ADMIN — MORPHINE SULFATE 4 MILLIGRAM(S): 50 CAPSULE, EXTENDED RELEASE ORAL at 01:46

## 2019-11-03 NOTE — DISCHARGE NOTE OB - CARE PROVIDER_API CALL
Lulú Dubon (DO; MS)  [facility]  [facility] [address] [place] [address]  Phone: [phone]  Fax: [phone]  Follow Up Time:

## 2019-11-03 NOTE — DISCHARGE NOTE OB - PLAN OF CARE
Feel well Patient admitted for therapeutic rest due to painful contractions.  No cervical change and patient is now comfortable.  Discharge home with precautions and routine follow up.

## 2019-11-03 NOTE — DISCHARGE NOTE OB - CARE PLAN
Principal Discharge DX:	38 weeks gestation of pregnancy  Goal:	Feel well  Assessment and plan of treatment:	Patient admitted for therapeutic rest due to painful contractions.  No cervical change and patient is now comfortable.  Discharge home with precautions and routine follow up.

## 2019-11-03 NOTE — DISCHARGE NOTE OB - PATIENT PORTAL LINK FT
You can access the FollowMyHealth Patient Portal offered by [facility] by registering at the following website: [email or web address]. By joining [employer]’s FollowMyHealth portal, you will also be able to view your health information using other applications (apps) compatible with our system.

## 2019-11-03 NOTE — DISCHARGE NOTE OB - HOSPITAL COURSE
Patient admitted for therapeutic rest.  Received 4mg morphine.  Comfortable and no change in cervix.

## 2019-11-04 LAB
BASOPHILS # BLD AUTO: 0.05 K/UL — SIGNIFICANT CHANGE UP (ref 0–0.2)
BASOPHILS NFR BLD AUTO: 0.4 % — SIGNIFICANT CHANGE UP (ref 0–2)
EOSINOPHIL # BLD AUTO: 0.11 K/UL — SIGNIFICANT CHANGE UP (ref 0–0.5)
EOSINOPHIL NFR BLD AUTO: 1 % — SIGNIFICANT CHANGE UP (ref 0–6)
HCT VFR BLD CALC: 38.3 % — SIGNIFICANT CHANGE UP (ref 34.5–45)
HGB BLD-MCNC: 12.6 G/DL — SIGNIFICANT CHANGE UP (ref 11.5–15.5)
IMM GRANULOCYTES NFR BLD AUTO: 1.1 % — SIGNIFICANT CHANGE UP (ref 0–1.5)
LYMPHOCYTES # BLD AUTO: 1.99 K/UL — SIGNIFICANT CHANGE UP (ref 1–3.3)
LYMPHOCYTES # BLD AUTO: 17.8 % — SIGNIFICANT CHANGE UP (ref 13–44)
MCHC RBC-ENTMCNC: 29.4 PG — SIGNIFICANT CHANGE UP (ref 27–34)
MCHC RBC-ENTMCNC: 32.9 GM/DL — SIGNIFICANT CHANGE UP (ref 32–36)
MCV RBC AUTO: 89.3 FL — SIGNIFICANT CHANGE UP (ref 80–100)
MONOCYTES # BLD AUTO: 0.79 K/UL — SIGNIFICANT CHANGE UP (ref 0–0.9)
MONOCYTES NFR BLD AUTO: 7.1 % — SIGNIFICANT CHANGE UP (ref 2–14)
NEUTROPHILS # BLD AUTO: 8.13 K/UL — HIGH (ref 1.8–7.4)
NEUTROPHILS NFR BLD AUTO: 72.6 % — SIGNIFICANT CHANGE UP (ref 43–77)
NRBC # BLD: 0 /100 WBCS — SIGNIFICANT CHANGE UP (ref 0–0)
PLATELET # BLD AUTO: 283 K/UL — SIGNIFICANT CHANGE UP (ref 150–400)
RBC # BLD: 4.29 M/UL — SIGNIFICANT CHANGE UP (ref 3.8–5.2)
RBC # FLD: 14 % — SIGNIFICANT CHANGE UP (ref 10.3–14.5)
T PALLIDUM AB TITR SER: NEGATIVE — SIGNIFICANT CHANGE UP
WBC # BLD: 11.19 K/UL — HIGH (ref 3.8–10.5)
WBC # FLD AUTO: 11.19 K/UL — HIGH (ref 3.8–10.5)

## 2019-11-04 RX ORDER — CITRIC ACID/SODIUM CITRATE 300-500 MG
15 SOLUTION, ORAL ORAL EVERY 6 HOURS
Refills: 0 | Status: DISCONTINUED | OUTPATIENT
Start: 2019-11-04 | End: 2019-11-05

## 2019-11-04 RX ORDER — PENICILLIN G POTASSIUM 5000000 [IU]/1
2.5 POWDER, FOR SOLUTION INTRAMUSCULAR; INTRAPLEURAL; INTRATHECAL; INTRAVENOUS EVERY 4 HOURS
Refills: 0 | Status: DISCONTINUED | OUTPATIENT
Start: 2019-11-04 | End: 2019-11-05

## 2019-11-04 RX ORDER — PENICILLIN G POTASSIUM 5000000 [IU]/1
5 POWDER, FOR SOLUTION INTRAMUSCULAR; INTRAPLEURAL; INTRATHECAL; INTRAVENOUS ONCE
Refills: 0 | Status: COMPLETED | OUTPATIENT
Start: 2019-11-04 | End: 2019-11-04

## 2019-11-04 RX ORDER — SODIUM CHLORIDE 9 MG/ML
1000 INJECTION, SOLUTION INTRAVENOUS
Refills: 0 | Status: DISCONTINUED | OUTPATIENT
Start: 2019-11-04 | End: 2019-11-05

## 2019-11-04 RX ORDER — OXYTOCIN 10 UNIT/ML
333.33 VIAL (ML) INJECTION
Qty: 20 | Refills: 0 | Status: DISCONTINUED | OUTPATIENT
Start: 2019-11-04 | End: 2019-11-05

## 2019-11-04 RX ORDER — OXYTOCIN 10 UNIT/ML
1 VIAL (ML) INJECTION
Qty: 30 | Refills: 0 | Status: DISCONTINUED | OUTPATIENT
Start: 2019-11-04 | End: 2019-11-05

## 2019-11-04 RX ORDER — ONDANSETRON 8 MG/1
4 TABLET, FILM COATED ORAL ONCE
Refills: 0 | Status: COMPLETED | OUTPATIENT
Start: 2019-11-04 | End: 2019-11-04

## 2019-11-04 RX ORDER — FENTANYL/BUPIVACAINE/NS/PF 2MCG/ML-.1
250 PLASTIC BAG, INJECTION (ML) INJECTION
Refills: 0 | Status: DISCONTINUED | OUTPATIENT
Start: 2019-11-04 | End: 2019-11-05

## 2019-11-04 RX ORDER — PROPRANOLOL HCL 160 MG
10 CAPSULE, EXTENDED RELEASE 24HR ORAL THREE TIMES A DAY
Refills: 0 | Status: DISCONTINUED | OUTPATIENT
Start: 2019-11-04 | End: 2019-11-07

## 2019-11-04 RX ORDER — PENICILLIN G POTASSIUM 5000000 [IU]/1
POWDER, FOR SOLUTION INTRAMUSCULAR; INTRAPLEURAL; INTRATHECAL; INTRAVENOUS
Refills: 0 | Status: DISCONTINUED | OUTPATIENT
Start: 2019-11-04 | End: 2019-11-05

## 2019-11-04 RX ADMIN — PENICILLIN G POTASSIUM 100 MILLION UNIT(S): 5000000 POWDER, FOR SOLUTION INTRAMUSCULAR; INTRAPLEURAL; INTRATHECAL; INTRAVENOUS at 20:38

## 2019-11-04 RX ADMIN — ONDANSETRON 4 MILLIGRAM(S): 8 TABLET, FILM COATED ORAL at 21:13

## 2019-11-04 RX ADMIN — Medication 250 MILLILITER(S): at 20:06

## 2019-11-04 RX ADMIN — SODIUM CHLORIDE 125 MILLILITER(S): 9 INJECTION, SOLUTION INTRAVENOUS at 17:50

## 2019-11-04 RX ADMIN — PENICILLIN G POTASSIUM 100 MILLION UNIT(S): 5000000 POWDER, FOR SOLUTION INTRAMUSCULAR; INTRAPLEURAL; INTRATHECAL; INTRAVENOUS at 16:25

## 2019-11-04 RX ADMIN — SODIUM CHLORIDE 125 MILLILITER(S): 9 INJECTION, SOLUTION INTRAVENOUS at 17:43

## 2019-11-04 RX ADMIN — Medication 10 MILLIGRAM(S): at 22:01

## 2019-11-04 RX ADMIN — Medication 1 MILLIUNIT(S)/MIN: at 21:16

## 2019-11-04 RX ADMIN — SODIUM CHLORIDE 125 MILLILITER(S): 9 INJECTION, SOLUTION INTRAVENOUS at 20:01

## 2019-11-05 LAB
BASE EXCESS BLDCOA CALC-SCNC: -5.4 MMOL/L — SIGNIFICANT CHANGE UP (ref -11.6–0.4)
BASE EXCESS BLDCOV CALC-SCNC: -5.9 MMOL/L — SIGNIFICANT CHANGE UP (ref -9.3–0.3)
GAS PNL BLDCOV: 7.24 — LOW (ref 7.25–7.45)
HCO3 BLDCOA-SCNC: 24.2 MMOL/L — SIGNIFICANT CHANGE UP
HCO3 BLDCOV-SCNC: 22 MMOL/L — SIGNIFICANT CHANGE UP
PCO2 BLDCOA: 65 MMHG — SIGNIFICANT CHANGE UP (ref 32–66)
PCO2 BLDCOV: 53 MMHG — HIGH (ref 27–49)
PH BLDCOA: 7.19 — SIGNIFICANT CHANGE UP (ref 7.18–7.38)
PO2 BLDCOA: 20 MMHG — SIGNIFICANT CHANGE UP (ref 6–31)
PO2 BLDCOA: 26 MMHG — SIGNIFICANT CHANGE UP (ref 17–41)
SAO2 % BLDCOA: 31 % — SIGNIFICANT CHANGE UP
SAO2 % BLDCOV: 52.5 % — SIGNIFICANT CHANGE UP
T PALLIDUM AB TITR SER: NEGATIVE — SIGNIFICANT CHANGE UP

## 2019-11-05 RX ORDER — GLYCERIN ADULT
1 SUPPOSITORY, RECTAL RECTAL AT BEDTIME
Refills: 0 | Status: DISCONTINUED | OUTPATIENT
Start: 2019-11-05 | End: 2019-11-07

## 2019-11-05 RX ORDER — FLUOXETINE HCL 10 MG
40 CAPSULE ORAL DAILY
Refills: 0 | Status: DISCONTINUED | OUTPATIENT
Start: 2019-11-05 | End: 2019-11-07

## 2019-11-05 RX ORDER — MAGNESIUM HYDROXIDE 400 MG/1
30 TABLET, CHEWABLE ORAL
Refills: 0 | Status: DISCONTINUED | OUTPATIENT
Start: 2019-11-05 | End: 2019-11-07

## 2019-11-05 RX ORDER — HYDROCORTISONE 1 %
1 OINTMENT (GRAM) TOPICAL EVERY 6 HOURS
Refills: 0 | Status: DISCONTINUED | OUTPATIENT
Start: 2019-11-05 | End: 2019-11-07

## 2019-11-05 RX ORDER — SIMETHICONE 80 MG/1
80 TABLET, CHEWABLE ORAL EVERY 4 HOURS
Refills: 0 | Status: DISCONTINUED | OUTPATIENT
Start: 2019-11-05 | End: 2019-11-07

## 2019-11-05 RX ORDER — LANOLIN
1 OINTMENT (GRAM) TOPICAL EVERY 6 HOURS
Refills: 0 | Status: DISCONTINUED | OUTPATIENT
Start: 2019-11-05 | End: 2019-11-07

## 2019-11-05 RX ORDER — IBUPROFEN 200 MG
600 TABLET ORAL EVERY 6 HOURS
Refills: 0 | Status: DISCONTINUED | OUTPATIENT
Start: 2019-11-05 | End: 2019-11-07

## 2019-11-05 RX ORDER — SODIUM CHLORIDE 9 MG/ML
3 INJECTION INTRAMUSCULAR; INTRAVENOUS; SUBCUTANEOUS EVERY 8 HOURS
Refills: 0 | Status: DISCONTINUED | OUTPATIENT
Start: 2019-11-05 | End: 2019-11-06

## 2019-11-05 RX ORDER — PRAMOXINE HYDROCHLORIDE 150 MG/15G
1 AEROSOL, FOAM RECTAL EVERY 4 HOURS
Refills: 0 | Status: DISCONTINUED | OUTPATIENT
Start: 2019-11-05 | End: 2019-11-07

## 2019-11-05 RX ORDER — OXYCODONE HYDROCHLORIDE 5 MG/1
5 TABLET ORAL
Refills: 0 | Status: DISCONTINUED | OUTPATIENT
Start: 2019-11-05 | End: 2019-11-07

## 2019-11-05 RX ORDER — BENZOCAINE 10 %
1 GEL (GRAM) MUCOUS MEMBRANE EVERY 6 HOURS
Refills: 0 | Status: DISCONTINUED | OUTPATIENT
Start: 2019-11-05 | End: 2019-11-07

## 2019-11-05 RX ORDER — ONDANSETRON 8 MG/1
4 TABLET, FILM COATED ORAL ONCE
Refills: 0 | Status: DISCONTINUED | OUTPATIENT
Start: 2019-11-05 | End: 2019-11-05

## 2019-11-05 RX ORDER — OXYTOCIN 10 UNIT/ML
333.33 VIAL (ML) INJECTION
Qty: 20 | Refills: 0 | Status: DISCONTINUED | OUTPATIENT
Start: 2019-11-05 | End: 2019-11-07

## 2019-11-05 RX ORDER — KETOROLAC TROMETHAMINE 30 MG/ML
30 SYRINGE (ML) INJECTION ONCE
Refills: 0 | Status: DISCONTINUED | OUTPATIENT
Start: 2019-11-05 | End: 2019-11-05

## 2019-11-05 RX ORDER — AER TRAVELER 0.5 G/1
1 SOLUTION RECTAL; TOPICAL EVERY 4 HOURS
Refills: 0 | Status: DISCONTINUED | OUTPATIENT
Start: 2019-11-05 | End: 2019-11-07

## 2019-11-05 RX ORDER — PANTOPRAZOLE SODIUM 20 MG/1
40 TABLET, DELAYED RELEASE ORAL
Refills: 0 | Status: DISCONTINUED | OUTPATIENT
Start: 2019-11-05 | End: 2019-11-07

## 2019-11-05 RX ORDER — IBUPROFEN 200 MG
600 TABLET ORAL EVERY 6 HOURS
Refills: 0 | Status: COMPLETED | OUTPATIENT
Start: 2019-11-05 | End: 2020-10-03

## 2019-11-05 RX ORDER — TETANUS TOXOID, REDUCED DIPHTHERIA TOXOID AND ACELLULAR PERTUSSIS VACCINE, ADSORBED 5; 2.5; 8; 8; 2.5 [IU]/.5ML; [IU]/.5ML; UG/.5ML; UG/.5ML; UG/.5ML
0.5 SUSPENSION INTRAMUSCULAR ONCE
Refills: 0 | Status: DISCONTINUED | OUTPATIENT
Start: 2019-11-05 | End: 2019-11-07

## 2019-11-05 RX ORDER — OXYCODONE HYDROCHLORIDE 5 MG/1
5 TABLET ORAL ONCE
Refills: 0 | Status: DISCONTINUED | OUTPATIENT
Start: 2019-11-05 | End: 2019-11-07

## 2019-11-05 RX ORDER — DIBUCAINE 1 %
1 OINTMENT (GRAM) RECTAL EVERY 6 HOURS
Refills: 0 | Status: DISCONTINUED | OUTPATIENT
Start: 2019-11-05 | End: 2019-11-07

## 2019-11-05 RX ORDER — ACETAMINOPHEN 500 MG
975 TABLET ORAL
Refills: 0 | Status: DISCONTINUED | OUTPATIENT
Start: 2019-11-05 | End: 2019-11-07

## 2019-11-05 RX ORDER — DIPHENHYDRAMINE HCL 50 MG
25 CAPSULE ORAL EVERY 6 HOURS
Refills: 0 | Status: DISCONTINUED | OUTPATIENT
Start: 2019-11-05 | End: 2019-11-07

## 2019-11-05 RX ADMIN — PANTOPRAZOLE SODIUM 40 MILLIGRAM(S): 20 TABLET, DELAYED RELEASE ORAL at 14:58

## 2019-11-05 RX ADMIN — Medication 600 MILLIGRAM(S): at 14:00

## 2019-11-05 RX ADMIN — PENICILLIN G POTASSIUM 100 MILLION UNIT(S): 5000000 POWDER, FOR SOLUTION INTRAMUSCULAR; INTRAPLEURAL; INTRATHECAL; INTRAVENOUS at 00:17

## 2019-11-05 RX ADMIN — Medication 975 MILLIGRAM(S): at 14:57

## 2019-11-05 RX ADMIN — SODIUM CHLORIDE 3 MILLILITER(S): 9 INJECTION INTRAMUSCULAR; INTRAVENOUS; SUBCUTANEOUS at 13:54

## 2019-11-05 RX ADMIN — Medication 10 MILLIGRAM(S): at 22:50

## 2019-11-05 RX ADMIN — Medication 40 MILLIGRAM(S): at 13:14

## 2019-11-05 RX ADMIN — Medication 30 MILLIGRAM(S): at 06:40

## 2019-11-05 RX ADMIN — Medication 975 MILLIGRAM(S): at 15:27

## 2019-11-05 RX ADMIN — Medication 1 SPRAY(S): at 14:57

## 2019-11-05 RX ADMIN — Medication 975 MILLIGRAM(S): at 09:34

## 2019-11-05 RX ADMIN — Medication 975 MILLIGRAM(S): at 21:30

## 2019-11-05 RX ADMIN — Medication 10 MILLIGRAM(S): at 14:58

## 2019-11-05 RX ADMIN — SODIUM CHLORIDE 3 MILLILITER(S): 9 INJECTION INTRAMUSCULAR; INTRAVENOUS; SUBCUTANEOUS at 21:03

## 2019-11-05 RX ADMIN — Medication 600 MILLIGRAM(S): at 18:35

## 2019-11-05 RX ADMIN — Medication 1 APPLICATION(S): at 14:57

## 2019-11-05 RX ADMIN — Medication 600 MILLIGRAM(S): at 19:30

## 2019-11-05 RX ADMIN — Medication 600 MILLIGRAM(S): at 13:13

## 2019-11-05 RX ADMIN — Medication 975 MILLIGRAM(S): at 20:43

## 2019-11-05 RX ADMIN — Medication 10 MILLIGRAM(S): at 07:55

## 2019-11-06 RX ORDER — POLYETHYLENE GLYCOL 3350 17 G/17G
17 POWDER, FOR SOLUTION ORAL DAILY
Refills: 0 | Status: DISCONTINUED | OUTPATIENT
Start: 2019-11-06 | End: 2019-11-07

## 2019-11-06 RX ORDER — DOCUSATE SODIUM 100 MG
100 CAPSULE ORAL
Refills: 0 | Status: DISCONTINUED | OUTPATIENT
Start: 2019-11-06 | End: 2019-11-07

## 2019-11-06 RX ORDER — SODIUM CHLORIDE 9 MG/ML
1 INJECTION INTRAMUSCULAR; INTRAVENOUS; SUBCUTANEOUS THREE TIMES A DAY
Refills: 0 | Status: DISCONTINUED | OUTPATIENT
Start: 2019-11-06 | End: 2019-11-07

## 2019-11-06 RX ADMIN — Medication 10 MILLIGRAM(S): at 14:19

## 2019-11-06 RX ADMIN — PANTOPRAZOLE SODIUM 40 MILLIGRAM(S): 20 TABLET, DELAYED RELEASE ORAL at 07:09

## 2019-11-06 RX ADMIN — Medication 975 MILLIGRAM(S): at 13:00

## 2019-11-06 RX ADMIN — Medication 600 MILLIGRAM(S): at 08:50

## 2019-11-06 RX ADMIN — OXYCODONE HYDROCHLORIDE 5 MILLIGRAM(S): 5 TABLET ORAL at 21:10

## 2019-11-06 RX ADMIN — Medication 600 MILLIGRAM(S): at 15:24

## 2019-11-06 RX ADMIN — SODIUM CHLORIDE 3 MILLILITER(S): 9 INJECTION INTRAMUSCULAR; INTRAVENOUS; SUBCUTANEOUS at 06:25

## 2019-11-06 RX ADMIN — SODIUM CHLORIDE 1 GRAM(S): 9 INJECTION INTRAMUSCULAR; INTRAVENOUS; SUBCUTANEOUS at 14:18

## 2019-11-06 RX ADMIN — Medication 1 SPRAY(S): at 08:51

## 2019-11-06 RX ADMIN — Medication 600 MILLIGRAM(S): at 02:45

## 2019-11-06 RX ADMIN — Medication 975 MILLIGRAM(S): at 19:55

## 2019-11-06 RX ADMIN — OXYCODONE HYDROCHLORIDE 5 MILLIGRAM(S): 5 TABLET ORAL at 22:00

## 2019-11-06 RX ADMIN — Medication 600 MILLIGRAM(S): at 21:11

## 2019-11-06 RX ADMIN — Medication 1 TABLET(S): at 11:53

## 2019-11-06 RX ADMIN — Medication 600 MILLIGRAM(S): at 03:30

## 2019-11-06 RX ADMIN — Medication 600 MILLIGRAM(S): at 10:00

## 2019-11-06 RX ADMIN — Medication 975 MILLIGRAM(S): at 06:25

## 2019-11-06 RX ADMIN — Medication 10 MILLIGRAM(S): at 21:10

## 2019-11-06 RX ADMIN — Medication 10 MILLIGRAM(S): at 06:25

## 2019-11-06 RX ADMIN — Medication 600 MILLIGRAM(S): at 22:00

## 2019-11-06 RX ADMIN — Medication 40 MILLIGRAM(S): at 11:55

## 2019-11-06 RX ADMIN — SODIUM CHLORIDE 1 GRAM(S): 9 INJECTION INTRAMUSCULAR; INTRAVENOUS; SUBCUTANEOUS at 21:10

## 2019-11-06 RX ADMIN — Medication 975 MILLIGRAM(S): at 18:53

## 2019-11-06 RX ADMIN — Medication 600 MILLIGRAM(S): at 14:45

## 2019-11-06 RX ADMIN — Medication 975 MILLIGRAM(S): at 11:55

## 2019-11-06 NOTE — PROGRESS NOTE ADULT - SUBJECTIVE AND OBJECTIVE BOX
A/P 29y s/p VAVD, PPD #1, stable, meeting postpartum milestones   - Pain: well controlled on tylenol and motrin  - GI: Tolerating regular diet, colace PRN  - : urinating without difficulty/pain  -DVT prophylaxis: ambulating frequently  -Dispo: PPD 2, unless otherwise specified

## 2019-11-06 NOTE — PROGRESS NOTE ADULT - ATTENDING COMMENTS
Patient seen and examined at bedside. Agree with above evaluation and assessment. Plan for discharge PPD2 if pt remains hemodynamically stable

## 2019-11-06 NOTE — LACTATION INITIAL EVALUATION - LACTATION INTERVENTIONS
Mom has a breast pump at home./initiate dual electric pump routine/initiate skin to skin/initiate hand expression routine

## 2019-11-07 VITALS
TEMPERATURE: 97 F | SYSTOLIC BLOOD PRESSURE: 118 MMHG | OXYGEN SATURATION: 97 % | RESPIRATION RATE: 18 BRPM | DIASTOLIC BLOOD PRESSURE: 77 MMHG | HEART RATE: 97 BPM

## 2019-11-07 LAB — SURGICAL PATHOLOGY STUDY: SIGNIFICANT CHANGE UP

## 2019-11-07 PROCEDURE — 88307 TISSUE EXAM BY PATHOLOGIST: CPT

## 2019-11-07 PROCEDURE — 86900 BLOOD TYPING SEROLOGIC ABO: CPT

## 2019-11-07 PROCEDURE — 86780 TREPONEMA PALLIDUM: CPT

## 2019-11-07 PROCEDURE — 36415 COLL VENOUS BLD VENIPUNCTURE: CPT

## 2019-11-07 PROCEDURE — 85025 COMPLETE CBC W/AUTO DIFF WBC: CPT

## 2019-11-07 PROCEDURE — 82803 BLOOD GASES ANY COMBINATION: CPT

## 2019-11-07 PROCEDURE — 85027 COMPLETE CBC AUTOMATED: CPT

## 2019-11-07 PROCEDURE — 86850 RBC ANTIBODY SCREEN: CPT

## 2019-11-07 PROCEDURE — 99214 OFFICE O/P EST MOD 30 MIN: CPT

## 2019-11-07 PROCEDURE — 86901 BLOOD TYPING SEROLOGIC RH(D): CPT

## 2019-11-07 RX ORDER — FLUOXETINE HCL 10 MG
1 CAPSULE ORAL
Qty: 0 | Refills: 0 | DISCHARGE

## 2019-11-07 RX ORDER — OMEPRAZOLE 10 MG/1
1 CAPSULE, DELAYED RELEASE ORAL
Qty: 0 | Refills: 0 | DISCHARGE

## 2019-11-07 RX ORDER — FLUOXETINE HCL 10 MG
1 CAPSULE ORAL
Qty: 0 | Refills: 0 | DISCHARGE
Start: 2019-11-07

## 2019-11-07 RX ORDER — BENZOCAINE 10 %
1 GEL (GRAM) MUCOUS MEMBRANE
Qty: 0 | Refills: 0 | DISCHARGE
Start: 2019-11-07

## 2019-11-07 RX ORDER — IBUPROFEN 200 MG
1 TABLET ORAL
Qty: 0 | Refills: 0 | DISCHARGE
Start: 2019-11-07

## 2019-11-07 RX ORDER — SODIUM CHLORIDE 9 MG/ML
0 INJECTION INTRAMUSCULAR; INTRAVENOUS; SUBCUTANEOUS
Qty: 0 | Refills: 0 | DISCHARGE

## 2019-11-07 RX ORDER — ACETAMINOPHEN 500 MG
3 TABLET ORAL
Qty: 0 | Refills: 0 | DISCHARGE
Start: 2019-11-07

## 2019-11-07 RX ADMIN — Medication 600 MILLIGRAM(S): at 04:21

## 2019-11-07 RX ADMIN — Medication 975 MILLIGRAM(S): at 09:52

## 2019-11-07 RX ADMIN — Medication 975 MILLIGRAM(S): at 10:50

## 2019-11-07 RX ADMIN — Medication 1 TABLET(S): at 14:20

## 2019-11-07 RX ADMIN — Medication 600 MILLIGRAM(S): at 14:21

## 2019-11-07 RX ADMIN — Medication 10 MILLIGRAM(S): at 14:21

## 2019-11-07 RX ADMIN — POLYETHYLENE GLYCOL 3350 17 GRAM(S): 17 POWDER, FOR SOLUTION ORAL at 14:21

## 2019-11-07 RX ADMIN — PANTOPRAZOLE SODIUM 40 MILLIGRAM(S): 20 TABLET, DELAYED RELEASE ORAL at 06:29

## 2019-11-07 RX ADMIN — Medication 600 MILLIGRAM(S): at 04:50

## 2019-11-07 RX ADMIN — Medication 40 MILLIGRAM(S): at 14:21

## 2019-11-07 RX ADMIN — SODIUM CHLORIDE 1 GRAM(S): 9 INJECTION INTRAMUSCULAR; INTRAVENOUS; SUBCUTANEOUS at 06:30

## 2019-11-07 RX ADMIN — Medication 10 MILLIGRAM(S): at 06:30

## 2019-11-07 NOTE — PROGRESS NOTE ADULT - ASSESSMENT
A/P 29y s/p VAVD, PPD #2, stable, meeting postpartum milestones   - Pain: well controlled on tylenol and motrin  - GI: Tolerating regular diet, colace PRN  - : urinating without difficulty/pain  -DVT prophylaxis: ambulating frequently  -Dispo: PPD 2, unless otherwise specified

## 2019-11-07 NOTE — PROGRESS NOTE ADULT - SUBJECTIVE AND OBJECTIVE BOX
Patient evaluated at bedside this morning, resting comfortable in bed, no acute events overnight.  She reports pain is well controlled with tylenol and motrin  She denies headache, dizziness, chest pain, palpitations, shortness of breath, nausea, vomiting, heavy vaginal bleeding or perineal discomfort. Reports decrease in amount of vaginal bleeding and denies clots.  She has been ambulating without assistance, voiding spontaneously, and is breastfeeding.   Tolerating food well, without nausea/vomit.  Passing flatus.     Physical Exam:  T(C): 36.4 (11-07-19 @ 06:08), Max: 36.4 (11-06-19 @ 22:00)  HR: 70 (11-07-19 @ 06:08) (67 - 70)  BP: 118/75 (11-07-19 @ 06:08) (116/76 - 118/75)  RR: 17 (11-07-19 @ 06:08) (17 - 18)  SpO2: 96% (11-07-19 @ 06:08) (96% - 98%)    GA: NAD, A&O x 3  CV: RRR, no murmurs, rubs, or gallops  Pulm: clear breath sounds throughout, no rales, rhonchi, wheezes  Abd: + BS, soft, nontender, nondistended, no rebound or guarding, uterus firm at midline, and 2fb below umbilicus  Perineum: intact, minimal swelling, small amount of bleeding on pad, no clots  Extremities: no swelling or calf tenderness            acetaminophen   Tablet .. 975 milliGRAM(s) Oral <User Schedule>  benzocaine 20%/menthol 0.5% Spray 1 Spray(s) Topical every 6 hours PRN  dibucaine 1% Ointment 1 Application(s) Topical every 6 hours PRN  diphenhydrAMINE 25 milliGRAM(s) Oral every 6 hours PRN  diphtheria/tetanus/pertussis (acellular) Vaccine (ADAcel) 0.5 milliLiter(s) IntraMuscular once  docusate sodium 100 milliGRAM(s) Oral two times a day  FLUoxetine 40 milliGRAM(s) Oral daily  glycerin Suppository - Adult 1 Suppository(s) Rectal at bedtime PRN  hydrocortisone 1% Cream 1 Application(s) Topical every 6 hours PRN  ibuprofen  Tablet. 600 milliGRAM(s) Oral every 6 hours  lanolin Ointment 1 Application(s) Topical every 6 hours PRN  magnesium hydroxide Suspension 30 milliLiter(s) Oral two times a day PRN  oxyCODONE    IR 5 milliGRAM(s) Oral every 3 hours PRN  oxyCODONE    IR 5 milliGRAM(s) Oral once PRN  oxytocin Infusion 333.333 milliUNIT(s)/Min IV Continuous <Continuous>  pantoprazole    Tablet 40 milliGRAM(s) Oral before breakfast  polyethylene glycol 3350 17 Gram(s) Oral daily  pramoxine 1%/zinc 5% Cream 1 Application(s) Topical every 4 hours PRN  prenatal multivitamin 1 Tablet(s) Oral daily  propranolol 10 milliGRAM(s) Oral three times a day  simethicone 80 milliGRAM(s) Chew every 4 hours PRN  sodium chloride 1 Gram(s) Oral three times a day  witch hazel Pads 1 Application(s) Topical every 4 hours PRN

## 2019-11-11 DIAGNOSIS — F10.21 ALCOHOL DEPENDENCE, IN REMISSION: ICD-10-CM

## 2019-11-11 DIAGNOSIS — O99.89 OTHER SPECIFIED DISEASES AND CONDITIONS COMPLICATING PREGNANCY, CHILDBIRTH AND THE PUERPERIUM: ICD-10-CM

## 2019-11-11 DIAGNOSIS — F32.9 MAJOR DEPRESSIVE DISORDER, SINGLE EPISODE, UNSPECIFIED: ICD-10-CM

## 2019-11-11 DIAGNOSIS — Z91.041 RADIOGRAPHIC DYE ALLERGY STATUS: ICD-10-CM

## 2019-11-11 DIAGNOSIS — I49.8 OTHER SPECIFIED CARDIAC ARRHYTHMIAS: ICD-10-CM

## 2019-11-11 DIAGNOSIS — Z34.80 ENCOUNTER FOR SUPERVISION OF OTHER NORMAL PREGNANCY, UNSPECIFIED TRIMESTER: ICD-10-CM

## 2019-11-11 DIAGNOSIS — Q79.60 EHLERS-DANLOS SYNDROME, UNSPECIFIED: ICD-10-CM

## 2019-11-11 DIAGNOSIS — Z3A.39 39 WEEKS GESTATION OF PREGNANCY: ICD-10-CM

## 2019-11-11 DIAGNOSIS — F41.9 ANXIETY DISORDER, UNSPECIFIED: ICD-10-CM

## 2020-07-01 ENCOUNTER — INPATIENT (INPATIENT)
Facility: HOSPITAL | Age: 30
LOS: 0 days | Discharge: HOME | DRG: 742 | End: 2020-07-02
Attending: OBSTETRICS & GYNECOLOGY | Admitting: OBSTETRICS & GYNECOLOGY
Payer: COMMERCIAL

## 2020-07-01 VITALS
RESPIRATION RATE: 18 BRPM | HEART RATE: 80 BPM | TEMPERATURE: 98 F | SYSTOLIC BLOOD PRESSURE: 97 MMHG | DIASTOLIC BLOOD PRESSURE: 64 MMHG | OXYGEN SATURATION: 99 %

## 2020-07-01 DIAGNOSIS — Z98.1 ARTHRODESIS STATUS: Chronic | ICD-10-CM

## 2020-07-01 DIAGNOSIS — Z01.818 ENCOUNTER FOR OTHER PREPROCEDURAL EXAMINATION: ICD-10-CM

## 2020-07-01 LAB
ANION GAP SERPL CALC-SCNC: 9 MMOL/L — SIGNIFICANT CHANGE UP (ref 5–17)
APPEARANCE UR: CLEAR — SIGNIFICANT CHANGE UP
APTT BLD: 34.6 SEC — SIGNIFICANT CHANGE UP (ref 27.5–35.5)
BASOPHILS # BLD AUTO: 0.04 K/UL — SIGNIFICANT CHANGE UP (ref 0–0.2)
BASOPHILS NFR BLD AUTO: 0.6 % — SIGNIFICANT CHANGE UP (ref 0–2)
BILIRUB UR-MCNC: NEGATIVE — SIGNIFICANT CHANGE UP
BLD GP AB SCN SERPL QL: NEGATIVE — SIGNIFICANT CHANGE UP
BUN SERPL-MCNC: 11 MG/DL — SIGNIFICANT CHANGE UP (ref 7–23)
CALCIUM SERPL-MCNC: 9 MG/DL — SIGNIFICANT CHANGE UP (ref 8.4–10.5)
CHLORIDE SERPL-SCNC: 104 MMOL/L — SIGNIFICANT CHANGE UP (ref 96–108)
CO2 SERPL-SCNC: 26 MMOL/L — SIGNIFICANT CHANGE UP (ref 22–31)
COLOR SPEC: YELLOW — SIGNIFICANT CHANGE UP
CREAT SERPL-MCNC: 0.84 MG/DL — SIGNIFICANT CHANGE UP (ref 0.5–1.3)
DIFF PNL FLD: NEGATIVE — SIGNIFICANT CHANGE UP
EOSINOPHIL # BLD AUTO: 0.31 K/UL — SIGNIFICANT CHANGE UP (ref 0–0.5)
EOSINOPHIL NFR BLD AUTO: 4.7 % — SIGNIFICANT CHANGE UP (ref 0–6)
GLUCOSE SERPL-MCNC: 112 MG/DL — HIGH (ref 70–99)
GLUCOSE UR QL: NEGATIVE — SIGNIFICANT CHANGE UP
HCG SERPL-ACNC: <0 MIU/ML — SIGNIFICANT CHANGE UP
HCT VFR BLD CALC: 37.9 % — SIGNIFICANT CHANGE UP (ref 34.5–45)
HGB BLD-MCNC: 12.8 G/DL — SIGNIFICANT CHANGE UP (ref 11.5–15.5)
IMM GRANULOCYTES NFR BLD AUTO: 0.3 % — SIGNIFICANT CHANGE UP (ref 0–1.5)
INR BLD: 1.04 — SIGNIFICANT CHANGE UP (ref 0.88–1.16)
KETONES UR-MCNC: NEGATIVE — SIGNIFICANT CHANGE UP
LEUKOCYTE ESTERASE UR-ACNC: NEGATIVE — SIGNIFICANT CHANGE UP
LYMPHOCYTES # BLD AUTO: 2.65 K/UL — SIGNIFICANT CHANGE UP (ref 1–3.3)
LYMPHOCYTES # BLD AUTO: 39.8 % — SIGNIFICANT CHANGE UP (ref 13–44)
MCHC RBC-ENTMCNC: 29.8 PG — SIGNIFICANT CHANGE UP (ref 27–34)
MCHC RBC-ENTMCNC: 33.8 GM/DL — SIGNIFICANT CHANGE UP (ref 32–36)
MCV RBC AUTO: 88.1 FL — SIGNIFICANT CHANGE UP (ref 80–100)
MONOCYTES # BLD AUTO: 0.44 K/UL — SIGNIFICANT CHANGE UP (ref 0–0.9)
MONOCYTES NFR BLD AUTO: 6.6 % — SIGNIFICANT CHANGE UP (ref 2–14)
NEUTROPHILS # BLD AUTO: 3.19 K/UL — SIGNIFICANT CHANGE UP (ref 1.8–7.4)
NEUTROPHILS NFR BLD AUTO: 48 % — SIGNIFICANT CHANGE UP (ref 43–77)
NITRITE UR-MCNC: NEGATIVE — SIGNIFICANT CHANGE UP
NRBC # BLD: 0 /100 WBCS — SIGNIFICANT CHANGE UP (ref 0–0)
PH UR: 7.5 — SIGNIFICANT CHANGE UP (ref 5–8)
PLATELET # BLD AUTO: 265 K/UL — SIGNIFICANT CHANGE UP (ref 150–400)
POTASSIUM SERPL-MCNC: 4.1 MMOL/L — SIGNIFICANT CHANGE UP (ref 3.5–5.3)
POTASSIUM SERPL-SCNC: 4.1 MMOL/L — SIGNIFICANT CHANGE UP (ref 3.5–5.3)
PROT UR-MCNC: NEGATIVE MG/DL — SIGNIFICANT CHANGE UP
PROTHROM AB SERPL-ACNC: 12.5 SEC — SIGNIFICANT CHANGE UP (ref 10.6–13.6)
RBC # BLD: 4.3 M/UL — SIGNIFICANT CHANGE UP (ref 3.8–5.2)
RBC # FLD: 12.1 % — SIGNIFICANT CHANGE UP (ref 10.3–14.5)
RH IG SCN BLD-IMP: POSITIVE — SIGNIFICANT CHANGE UP
SODIUM SERPL-SCNC: 139 MMOL/L — SIGNIFICANT CHANGE UP (ref 135–145)
SP GR SPEC: 1.01 — SIGNIFICANT CHANGE UP (ref 1–1.03)
UROBILINOGEN FLD QL: 0.2 E.U./DL — SIGNIFICANT CHANGE UP
WBC # BLD: 6.65 K/UL — SIGNIFICANT CHANGE UP (ref 3.8–10.5)
WBC # FLD AUTO: 6.65 K/UL — SIGNIFICANT CHANGE UP (ref 3.8–10.5)

## 2020-07-01 PROCEDURE — 88307 TISSUE EXAM BY PATHOLOGIST: CPT | Mod: 26

## 2020-07-01 PROCEDURE — 93010 ELECTROCARDIOGRAM REPORT: CPT

## 2020-07-01 PROCEDURE — 99285 EMERGENCY DEPT VISIT HI MDM: CPT

## 2020-07-01 RX ORDER — ACETAMINOPHEN 500 MG
1000 TABLET ORAL ONCE
Refills: 0 | Status: COMPLETED | OUTPATIENT
Start: 2020-07-01 | End: 2020-07-01

## 2020-07-01 RX ORDER — SIMETHICONE 80 MG/1
1 TABLET, CHEWABLE ORAL
Qty: 0 | Refills: 0 | DISCHARGE
Start: 2020-07-01

## 2020-07-01 RX ORDER — ONDANSETRON 8 MG/1
4 TABLET, FILM COATED ORAL ONCE
Refills: 0 | Status: DISCONTINUED | OUTPATIENT
Start: 2020-07-01 | End: 2020-07-02

## 2020-07-01 RX ORDER — SCOPALAMINE 1 MG/3D
1 PATCH, EXTENDED RELEASE TRANSDERMAL ONCE
Refills: 0 | Status: COMPLETED | OUTPATIENT
Start: 2020-07-01 | End: 2020-07-01

## 2020-07-01 RX ORDER — HYDROMORPHONE HYDROCHLORIDE 2 MG/ML
0.5 INJECTION INTRAMUSCULAR; INTRAVENOUS; SUBCUTANEOUS
Refills: 0 | Status: DISCONTINUED | OUTPATIENT
Start: 2020-07-01 | End: 2020-07-01

## 2020-07-01 RX ORDER — DIPHENHYDRAMINE HCL 50 MG
50 CAPSULE ORAL ONCE
Refills: 0 | Status: COMPLETED | OUTPATIENT
Start: 2020-07-01 | End: 2020-07-01

## 2020-07-01 RX ORDER — KETOROLAC TROMETHAMINE 30 MG/ML
30 SYRINGE (ML) INJECTION EVERY 6 HOURS
Refills: 0 | Status: DISCONTINUED | OUTPATIENT
Start: 2020-07-01 | End: 2020-07-02

## 2020-07-01 RX ORDER — OXYCODONE HYDROCHLORIDE 5 MG/1
5 TABLET ORAL EVERY 6 HOURS
Refills: 0 | Status: DISCONTINUED | OUTPATIENT
Start: 2020-07-01 | End: 2020-07-02

## 2020-07-01 RX ORDER — ACETAMINOPHEN 500 MG
975 TABLET ORAL EVERY 6 HOURS
Refills: 0 | Status: DISCONTINUED | OUTPATIENT
Start: 2020-07-02 | End: 2020-07-02

## 2020-07-01 RX ORDER — OXYCODONE HYDROCHLORIDE 5 MG/1
5 TABLET ORAL ONCE
Refills: 0 | Status: DISCONTINUED | OUTPATIENT
Start: 2020-07-01 | End: 2020-07-01

## 2020-07-01 RX ORDER — ACETAMINOPHEN 500 MG
650 TABLET ORAL EVERY 6 HOURS
Refills: 0 | Status: DISCONTINUED | OUTPATIENT
Start: 2020-07-01 | End: 2020-07-01

## 2020-07-01 RX ORDER — MORPHINE SULFATE 50 MG/1
4 CAPSULE, EXTENDED RELEASE ORAL EVERY 6 HOURS
Refills: 0 | Status: DISCONTINUED | OUTPATIENT
Start: 2020-07-01 | End: 2020-07-01

## 2020-07-01 RX ORDER — METOCLOPRAMIDE HCL 10 MG
10 TABLET ORAL EVERY 6 HOURS
Refills: 0 | Status: DISCONTINUED | OUTPATIENT
Start: 2020-07-01 | End: 2020-07-02

## 2020-07-01 RX ORDER — SODIUM CHLORIDE 9 MG/ML
1000 INJECTION, SOLUTION INTRAVENOUS
Refills: 0 | Status: DISCONTINUED | OUTPATIENT
Start: 2020-07-01 | End: 2020-07-02

## 2020-07-01 RX ORDER — SIMETHICONE 80 MG/1
80 TABLET, CHEWABLE ORAL EVERY 8 HOURS
Refills: 0 | Status: DISCONTINUED | OUTPATIENT
Start: 2020-07-01 | End: 2020-07-02

## 2020-07-01 RX ADMIN — OXYCODONE HYDROCHLORIDE 5 MILLIGRAM(S): 5 TABLET ORAL at 23:41

## 2020-07-01 RX ADMIN — SIMETHICONE 80 MILLIGRAM(S): 80 TABLET, CHEWABLE ORAL at 21:35

## 2020-07-01 RX ADMIN — Medication 30 MILLIGRAM(S): at 21:35

## 2020-07-01 RX ADMIN — SCOPALAMINE 1 PATCH: 1 PATCH, EXTENDED RELEASE TRANSDERMAL at 18:41

## 2020-07-01 RX ADMIN — Medication 50 MILLIGRAM(S): at 16:20

## 2020-07-01 RX ADMIN — Medication 650 MILLIGRAM(S): at 19:11

## 2020-07-01 RX ADMIN — OXYCODONE HYDROCHLORIDE 5 MILLIGRAM(S): 5 TABLET ORAL at 19:22

## 2020-07-01 RX ADMIN — SCOPALAMINE 1 PATCH: 1 PATCH, EXTENDED RELEASE TRANSDERMAL at 17:40

## 2020-07-01 RX ADMIN — MORPHINE SULFATE 4 MILLIGRAM(S): 50 CAPSULE, EXTENDED RELEASE ORAL at 11:03

## 2020-07-01 RX ADMIN — Medication 400 MILLIGRAM(S): at 06:11

## 2020-07-01 NOTE — DISCHARGE NOTE PROVIDER - CARE PROVIDER_API CALL
Lulú Dubon  [facility]  [address], [place] [address]  Phone: [phone]  Fax: [phone]  Follow Up Time:

## 2020-07-01 NOTE — DISCHARGE NOTE PROVIDER - HOSPITAL COURSE
29yo Female s/p laparoscopic left ovarian cystectomy for a 6cm left dermoid cyst.    The patient was hemodynamically stable throughout her stay and at discharge meeting all postOp milestones.

## 2020-07-01 NOTE — BRIEF OPERATIVE NOTE - OPERATION/FINDINGS
L/S left ovarian cystectomy  Richelle approch was used,  6 cm left dermoid cyst, burst while removed.  4L of saline was used for irrigation.  No abdominal adhesions. L/S left ovarian cystectomy  Richelle approach was used,  6 cm left dermoid cyst NOT torsed, burst while removed.  4L of saline was used for irrigation.  No abdominal adhesions.

## 2020-07-01 NOTE — DISCHARGE NOTE PROVIDER - NSDCMRMEDTOKEN_GEN_ALL_CORE_FT
FLUoxetine 40 mg oral capsule: 1 cap(s) orally once a day  ibuprofen 600 mg oral tablet: 1 tab(s) orally every 6 hours  simethicone 80 mg oral tablet, chewable: 1 tab(s) orally every 8 hours

## 2020-07-01 NOTE — H&P ADULT - ATTENDING COMMENTS
Agree with above evaluation and assessment. Plan for exam under anesthesia, laparoscopic ovarian cystectomy, possible resection of endometriosis, possible oophorectomy, possibel laparotomy, all r/b/a discussed with patient, patient understands and agrees. Uncomfortable at 10 am received morphine for pain control

## 2020-07-01 NOTE — H&P ADULT - ASSESSMENT
31yo  presents with left sided discomfort, nausea, bloating that started a few days ago with left ovarian cyst.  - Add on to OR for l/s removal of left ovarian cyst with help of general surgery    D/W Dr. Cordon

## 2020-07-01 NOTE — ED ADULT NURSE NOTE - OBJECTIVE STATEMENT
31yo female presents to ED, states her GYN asked her to arrive to [facility] at 5am for pre-op for ovarian cyst removal today. Complains of mild abd pain, abd soft, tender on palpation, BS active x4. Denies CP, N/V/D, headache, fevers/chills. Endorses having blood work and COVID testing yesterday with GYN.

## 2020-07-01 NOTE — H&P ADULT - HISTORY OF PRESENT ILLNESS
29yo  presents with left sided discomfort, nausea, bloating that started a few days ago.  Pt endorses a known left ovarian cyst that causes her pain, nausea, vomitting, bloating, loss of appetite intermittently.  The discomfort has increased.  Pt denies pain currently.  Denies fever, chills, chest pain, palpitations, SOB.  +flatus, +BM.    OB Hx:     GYN Hx: endometriosis with l/s endo excision in 2017, left ovarian cyst/multiple ovarian cysts over the years, no abnormal pap smears or procedures on the cervix  PMH: POTS, Ehler's Danlos  PSxH: intussusception repair with appendectomy at 6 years old, L5 and S1 fusion (anterior to posterior) in , endometriosis excision l/s in , repair of right hip labral tear in 2018  Meds: salt 1-2 mg TID, prozax 40mg daily, propranolol 20mg TID, omeprazole 20mg daily, progesterone only pill  ALL: sensitivity to iodine contrast - vomitting    Vital Signs Last 24 Hrs  T(C): 36.9 (2020 04:52), Max: 36.9 (2020 04:52)  T(F): 98.4 (2020 04:52), Max: 98.4 (2020 04:52)  HR: 80 (2020 04:52) (80 - 80)  BP: 97/64 (2020 04:52) (97/64 - 97/64)  BP(mean): --  RR: 18 (2020 04:52) (18 - 18)  SpO2: 99% (2020 04:52) (99% - 99%)    Physical Exam:  Gen: NAD, comfortable  GI: soft, tender on left side of abdomen, guarding with palpitation on left side, nondistended + BS, no rebound tenderness  BME: deferred  Spec: deferred  Ext: no edema, erythema, tenderness

## 2020-07-01 NOTE — ED PROVIDER NOTE - NS ED ROS FT
Constitutional: No fever or chills.   Eyes: No pain, blurry vision, or discharge.  ENMT: No hearing changes, pain, discharge or infections. No neck pain or stiffness.  Cardiac: No chest pain, SOB or edema. No chest pain with exertion.  Respiratory: No cough or respiratory distress. No hemoptysis. No history of asthma or RAD.  GI: No nausea, vomiting, diarrhea or abdominal pain.  : + History of ovarian cyst.  No dysuria, frequency or burning.  MS: No myalgia, muscle weakness, joint pain or back pain.  Neuro: No headache or weakness. No LOC.  Skin: No skin rash.   Endocrine: No history of thyroid disease or diabetes.  Except as documented in the HPI, all other systems are negative.

## 2020-07-01 NOTE — CHART NOTE - NSCHARTNOTEFT_GEN_A_CORE
Pt evaluated at bedside for complaint to RN about not wanting to be d/c'd for fear of being unable to perform ADLs at home.  Pt endorses lower pelvic pain 7/10 when urinating and discomfort while ambulating.  Pt requested pain medication.  Just received toradol.    Vital Signs Last 24 Hrs  T(C): 37.6 (01 Jul 2020 20:39), Max: 37.6 (01 Jul 2020 20:39)  T(F): 99.6 (01 Jul 2020 20:39), Max: 99.6 (01 Jul 2020 20:39)  HR: 91 (01 Jul 2020 20:39) (68 - 97)  BP: 98/62 (01 Jul 2020 20:39) (97/64 - 129/82)  BP(mean): 78 (01 Jul 2020 17:45) (78 - 100)  RR: 17 (01 Jul 2020 20:39) (14 - 18)  SpO2: 96% (01 Jul 2020 20:39) (95% - 100%)    PE: Gen: no acute distress  Abd: tender suprapubically (8/10 pain) with deep palpation, soft, nondistended, +BS      Plan to keep pt over night, bladder scan after next episode of urination, urine culture.    D/W Dr. Cordon

## 2020-07-01 NOTE — ED ADULT NURSE NOTE - CHPI ED NUR SYMPTOMS NEG
no vaginal discharge/no vomiting/no back pain/no nausea/no pain/no discharge/no fever/no coffee grounds emesis

## 2020-07-01 NOTE — ED PROVIDER NOTE - OBJECTIVE STATEMENT
30 year old female with history of Ehler Danlos Syndrome presents to ED at her gynecologist's request for preop clearance/admission for ovarian cyst removal scheduled for today.  Patient notes she had labwork and COVID testing completed yesterday in outpatient setting.  Patient notes she has been experiencing ongoing abdominal bloating, which she attributes to cyst.  She denies associated fever, chills, chest pain, shortness of breath, abdominal pain, nausea, emesis, changes to bowel movements, rashes, peripheral edema or any additional acute complaints or concerns at this time.

## 2020-07-01 NOTE — ED PROVIDER NOTE - CLINICAL SUMMARY MEDICAL DECISION MAKING FREE TEXT BOX
Patient in ED as sent by Dr. Carbajal for preoperative eval and admission for ovarian cyst removal today.  Patient well appearing, no acute complaints.  Gyn team aware and in ED to eval. Patient in ED as sent by Dr. Carbajal for preoperative eval and admission for ovarian cyst removal today.  Patient well appearing, no acute complaints.  Gyn team aware and in ED to eval.  Will admit as per gyn to regional floor for planned surgery later today.  Patient aware of plan and in agreement.

## 2020-07-01 NOTE — DISCHARGE NOTE PROVIDER - NSDCACTIVITY_GEN_ALL_CORE
No heavy lifting/straining/Showering allowed/Do not drive or operate machinery/Walking - Outdoors allowed

## 2020-07-02 VITALS
DIASTOLIC BLOOD PRESSURE: 74 MMHG | HEART RATE: 90 BPM | SYSTOLIC BLOOD PRESSURE: 105 MMHG | TEMPERATURE: 99 F | OXYGEN SATURATION: 95 % | RESPIRATION RATE: 17 BRPM

## 2020-07-02 LAB
ANION GAP SERPL CALC-SCNC: 11 MMOL/L — SIGNIFICANT CHANGE UP (ref 5–17)
BUN SERPL-MCNC: 8 MG/DL — SIGNIFICANT CHANGE UP (ref 7–23)
CALCIUM SERPL-MCNC: 9.1 MG/DL — SIGNIFICANT CHANGE UP (ref 8.4–10.5)
CHLORIDE SERPL-SCNC: 104 MMOL/L — SIGNIFICANT CHANGE UP (ref 96–108)
CO2 SERPL-SCNC: 23 MMOL/L — SIGNIFICANT CHANGE UP (ref 22–31)
CREAT SERPL-MCNC: 0.77 MG/DL — SIGNIFICANT CHANGE UP (ref 0.5–1.3)
GLUCOSE SERPL-MCNC: 125 MG/DL — HIGH (ref 70–99)
HCT VFR BLD CALC: 35.8 % — SIGNIFICANT CHANGE UP (ref 34.5–45)
HGB BLD-MCNC: 11.8 G/DL — SIGNIFICANT CHANGE UP (ref 11.5–15.5)
MCHC RBC-ENTMCNC: 29.5 PG — SIGNIFICANT CHANGE UP (ref 27–34)
MCHC RBC-ENTMCNC: 33 GM/DL — SIGNIFICANT CHANGE UP (ref 32–36)
MCV RBC AUTO: 89.5 FL — SIGNIFICANT CHANGE UP (ref 80–100)
NRBC # BLD: 0 /100 WBCS — SIGNIFICANT CHANGE UP (ref 0–0)
PLATELET # BLD AUTO: 274 K/UL — SIGNIFICANT CHANGE UP (ref 150–400)
POTASSIUM SERPL-MCNC: 4.1 MMOL/L — SIGNIFICANT CHANGE UP (ref 3.5–5.3)
POTASSIUM SERPL-SCNC: 4.1 MMOL/L — SIGNIFICANT CHANGE UP (ref 3.5–5.3)
RBC # BLD: 4 M/UL — SIGNIFICANT CHANGE UP (ref 3.8–5.2)
RBC # FLD: 12 % — SIGNIFICANT CHANGE UP (ref 10.3–14.5)
SODIUM SERPL-SCNC: 138 MMOL/L — SIGNIFICANT CHANGE UP (ref 135–145)
WBC # BLD: 9.66 K/UL — SIGNIFICANT CHANGE UP (ref 3.8–10.5)
WBC # FLD AUTO: 9.66 K/UL — SIGNIFICANT CHANGE UP (ref 3.8–10.5)

## 2020-07-02 PROCEDURE — 87086 URINE CULTURE/COLONY COUNT: CPT

## 2020-07-02 PROCEDURE — 36415 COLL VENOUS BLD VENIPUNCTURE: CPT

## 2020-07-02 PROCEDURE — 93005 ELECTROCARDIOGRAM TRACING: CPT

## 2020-07-02 PROCEDURE — 85610 PROTHROMBIN TIME: CPT

## 2020-07-02 PROCEDURE — 99285 EMERGENCY DEPT VISIT HI MDM: CPT | Mod: 25

## 2020-07-02 PROCEDURE — 86901 BLOOD TYPING SEROLOGIC RH(D): CPT

## 2020-07-02 PROCEDURE — 84702 CHORIONIC GONADOTROPIN TEST: CPT

## 2020-07-02 PROCEDURE — 85027 COMPLETE CBC AUTOMATED: CPT

## 2020-07-02 PROCEDURE — 81003 URINALYSIS AUTO W/O SCOPE: CPT

## 2020-07-02 PROCEDURE — 80048 BASIC METABOLIC PNL TOTAL CA: CPT

## 2020-07-02 PROCEDURE — 85025 COMPLETE CBC W/AUTO DIFF WBC: CPT

## 2020-07-02 PROCEDURE — 85730 THROMBOPLASTIN TIME PARTIAL: CPT

## 2020-07-02 PROCEDURE — 88307 TISSUE EXAM BY PATHOLOGIST: CPT

## 2020-07-02 PROCEDURE — 86850 RBC ANTIBODY SCREEN: CPT

## 2020-07-02 RX ADMIN — Medication 30 MILLIGRAM(S): at 09:24

## 2020-07-02 RX ADMIN — Medication 30 MILLIGRAM(S): at 03:22

## 2020-07-02 RX ADMIN — OXYCODONE HYDROCHLORIDE 5 MILLIGRAM(S): 5 TABLET ORAL at 05:37

## 2020-07-02 RX ADMIN — SCOPALAMINE 1 PATCH: 1 PATCH, EXTENDED RELEASE TRANSDERMAL at 05:38

## 2020-07-02 RX ADMIN — Medication 975 MILLIGRAM(S): at 01:15

## 2020-07-02 RX ADMIN — Medication 975 MILLIGRAM(S): at 06:32

## 2020-07-02 RX ADMIN — SIMETHICONE 80 MILLIGRAM(S): 80 TABLET, CHEWABLE ORAL at 05:06

## 2020-07-02 NOTE — PROGRESS NOTE ADULT - SUBJECTIVE AND OBJECTIVE BOX
GYN Progress Note    Patient seen at bedside.  Pain controlled on Tylenol and Motrin  Tolerating regular diet  OOB and Voiding  +flatus  Denies CP, palpitations, SOB, fever, chills, nausea, vomiting.    Vital Signs Last 24 Hrs  T(C): 37.2 (2020 05:46), Max: 37.6 (2020 20:39)  T(F): 99 (2020 05:46), Max: 99.6 (2020 20:39)  HR: 77 (2020 05:46) (68 - 97)  BP: 114/65 (2020 05:46) (98/62 - 129/82)  BP(mean): 78 (2020 17:45) (78 - 100)  RR: 17 (2020 05:46) (14 - 18)  SpO2: 96% (2020 05:46) (95% - 100%)    Physical Exam:  Gen: No Acute Distress  Pulm: Clear to auscultation bilaterally  GI: soft, appropriately nontender, nondistended, +BS, no rebound, no guarding.  Incision C/D/I  Ext: SCDs in place, wnl    I&O's Summary    2020 07:01  -  2020 07:00  --------------------------------------------------------  IN: 875 mL / OUT: 950 mL / NET: -75 mL      MEDICATIONS  (STANDING):  acetaminophen   Tablet .. 975 milliGRAM(s) Oral every 6 hours  ketorolac   Injectable 30 milliGRAM(s) IV Push every 6 hours  lactated ringers. 1000 milliLiter(s) (125 mL/Hr) IV Continuous <Continuous>  simethicone 80 milliGRAM(s) Chew every 8 hours    MEDICATIONS  (PRN):  metoclopramide Injectable 10 milliGRAM(s) IV Push every 6 hours PRN Nausea and/or Vomiting  ondansetron Injectable 4 milliGRAM(s) IV Push once PRN Postoperative Nausea and/or Vomiting  oxyCODONE    IR 5 milliGRAM(s) Oral every 6 hours PRN Severe Pain (7 - 10)      LABS:                        11.8   9.66  )-----------( 274      ( 2020 06:37 )             35.8     07-02    138  |  104  |  8   ----------------------------<  125<H>  4.1   |  23  |  0.77    Ca    9.1      2020 06:37      PT/INR - ( 2020 05:59 )   PT: 12.5 sec;   INR: 1.04          PTT - ( 2020 05:59 )  PTT:34.6 sec  Urinalysis Basic - ( 2020 05:59 )    Color: Yellow / Appearance: Clear / S.015 / pH: x  Gluc: x / Ketone: NEGATIVE  / Bili: Negative / Urobili: 0.2 E.U./dL   Blood: x / Protein: NEGATIVE mg/dL / Nitrite: NEGATIVE   Leuk Esterase: NEGATIVE / RBC: x / WBC x   Sq Epi: x / Non Sq Epi: x / Bacteria: x

## 2020-07-02 NOTE — DISCHARGE NOTE NURSING/CASE MANAGEMENT/SOCIAL WORK - PATIENT PORTAL LINK FT
You can access the FollowMyHealth Patient Portal offered by [facility] by registering at the following website: [email or web address]. By joining [email or web address] [email or web address] portal, you will also be able to view your health information using other applications (apps) compatible with our system.

## 2020-07-02 NOTE — PROGRESS NOTE ADULT - ASSESSMENT
30y Female POD#1 s/p L/S left ovarian cystectomy (for a 6cm left dermoid)  PostOp HB - 11.8    1. Neuro/Pain:  OPM  2  CV:   VS per routine  3. Pulm: Encourage ISS  4. GI: Reg  5. :  Voiding  6. Heme: Stable  7. ID: --  8. DVT ppx: SCDs  9. Dispo: Likely POD#1

## 2020-07-03 LAB
CULTURE RESULTS: NO GROWTH — SIGNIFICANT CHANGE UP
SPECIMEN SOURCE: SIGNIFICANT CHANGE UP

## 2020-07-06 DIAGNOSIS — Z98.1 ARTHRODESIS STATUS: ICD-10-CM

## 2020-07-06 DIAGNOSIS — D27.1 BENIGN NEOPLASM OF LEFT OVARY: ICD-10-CM

## 2020-07-06 DIAGNOSIS — I49.8 OTHER SPECIFIED CARDIAC ARRHYTHMIAS: ICD-10-CM

## 2020-07-06 DIAGNOSIS — Q79.60 EHLERS-DANLOS SYNDROME, UNSPECIFIED: ICD-10-CM

## 2020-07-06 DIAGNOSIS — Z91.041 RADIOGRAPHIC DYE ALLERGY STATUS: ICD-10-CM

## 2020-07-07 LAB — SURGICAL PATHOLOGY STUDY: SIGNIFICANT CHANGE UP

## 2020-08-02 ENCOUNTER — HOSPITAL ENCOUNTER (EMERGENCY)
Facility: CLINIC | Age: 30
Discharge: HOME | End: 2020-08-02
Attending: INTERNAL MEDICINE | Admitting: INTERNAL MEDICINE
Payer: COMMERCIAL

## 2020-08-02 ENCOUNTER — APPOINTMENT (OUTPATIENT)
Dept: ULTRASOUND IMAGING | Facility: CLINIC | Age: 30
End: 2020-08-02
Attending: INTERNAL MEDICINE
Payer: COMMERCIAL

## 2020-08-02 VITALS
WEIGHT: 153.2 LBS | HEIGHT: 64 IN | OXYGEN SATURATION: 98 % | RESPIRATION RATE: 16 BRPM | TEMPERATURE: 98.1 F | SYSTOLIC BLOOD PRESSURE: 111 MMHG | DIASTOLIC BLOOD PRESSURE: 79 MMHG | HEART RATE: 70 BPM | BODY MASS INDEX: 26.15 KG/M2

## 2020-08-02 DIAGNOSIS — R10.2 PELVIC PAIN IN FEMALE: ICD-10-CM

## 2020-08-02 LAB
ALBUMIN UR-MCNC: NEGATIVE MG/DL
ANION GAP SERPL CALCULATED.3IONS-SCNC: 4 MMOL/L (ref 3–14)
APPEARANCE UR: CLEAR
BACTERIA #/AREA URNS HPF: ABNORMAL /HPF
BASOPHILS # BLD AUTO: 0.1 10E9/L (ref 0–0.2)
BASOPHILS NFR BLD AUTO: 0.7 %
BILIRUB UR QL STRIP: NEGATIVE
BUN SERPL-MCNC: 14 MG/DL (ref 7–30)
CALCIUM SERPL-MCNC: 9.3 MG/DL (ref 8.5–10.1)
CHLORIDE SERPL-SCNC: 105 MMOL/L (ref 94–109)
CO2 SERPL-SCNC: 28 MMOL/L (ref 20–32)
COLOR UR AUTO: ABNORMAL
CREAT SERPL-MCNC: 0.79 MG/DL (ref 0.52–1.04)
CRP SERPL-MCNC: <2.9 MG/L (ref 0–8)
DIFFERENTIAL METHOD BLD: NORMAL
EOSINOPHIL # BLD AUTO: 0.3 10E9/L (ref 0–0.7)
EOSINOPHIL NFR BLD AUTO: 3.2 %
ERYTHROCYTE [DISTWIDTH] IN BLOOD BY AUTOMATED COUNT: 12.5 % (ref 10–15)
GFR SERPL CREATININE-BSD FRML MDRD: >90 ML/MIN/{1.73_M2}
GLUCOSE SERPL-MCNC: 91 MG/DL (ref 70–99)
GLUCOSE UR STRIP-MCNC: NEGATIVE MG/DL
HCG SERPL QL: NEGATIVE
HCT VFR BLD AUTO: 40.4 % (ref 35–47)
HGB BLD-MCNC: 13.6 G/DL (ref 11.7–15.7)
HGB UR QL STRIP: NEGATIVE
IMM GRANULOCYTES # BLD: 0 10E9/L (ref 0–0.4)
IMM GRANULOCYTES NFR BLD: 0.4 %
INR PPP: 1.06 (ref 0.86–1.14)
KETONES UR STRIP-MCNC: NEGATIVE MG/DL
LACTATE BLD-SCNC: 0.5 MMOL/L (ref 0.7–2)
LEUKOCYTE ESTERASE UR QL STRIP: NEGATIVE
LYMPHOCYTES # BLD AUTO: 2.6 10E9/L (ref 0.8–5.3)
LYMPHOCYTES NFR BLD AUTO: 26.7 %
MCH RBC QN AUTO: 30.1 PG (ref 26.5–33)
MCHC RBC AUTO-ENTMCNC: 33.7 G/DL (ref 31.5–36.5)
MCV RBC AUTO: 89 FL (ref 78–100)
MONOCYTES # BLD AUTO: 0.5 10E9/L (ref 0–1.3)
MONOCYTES NFR BLD AUTO: 5.4 %
MUCOUS THREADS #/AREA URNS LPF: PRESENT /LPF
NEUTROPHILS # BLD AUTO: 6.3 10E9/L (ref 1.6–8.3)
NEUTROPHILS NFR BLD AUTO: 63.6 %
NITRATE UR QL: NEGATIVE
NRBC # BLD AUTO: 0 10*3/UL
NRBC BLD AUTO-RTO: 0 /100
PH UR STRIP: 5.5 PH (ref 5–7)
PLATELET # BLD AUTO: 328 10E9/L (ref 150–450)
POTASSIUM SERPL-SCNC: 4 MMOL/L (ref 3.4–5.3)
RBC # BLD AUTO: 4.52 10E12/L (ref 3.8–5.2)
RBC #/AREA URNS AUTO: <1 /HPF (ref 0–2)
SODIUM SERPL-SCNC: 137 MMOL/L (ref 133–144)
SOURCE: ABNORMAL
SP GR UR STRIP: 1.01 (ref 1–1.03)
SPECIMEN SOURCE: NORMAL
UROBILINOGEN UR STRIP-MCNC: NORMAL MG/DL (ref 0–2)
WBC # BLD AUTO: 9.9 10E9/L (ref 4–11)
WBC #/AREA URNS AUTO: <1 /HPF (ref 0–5)
WET PREP SPEC: NORMAL

## 2020-08-02 PROCEDURE — 83605 ASSAY OF LACTIC ACID: CPT | Performed by: INTERNAL MEDICINE

## 2020-08-02 PROCEDURE — 25000132 ZZH RX MED GY IP 250 OP 250 PS 637: Performed by: INTERNAL MEDICINE

## 2020-08-02 PROCEDURE — 76830 TRANSVAGINAL US NON-OB: CPT

## 2020-08-02 PROCEDURE — 96361 HYDRATE IV INFUSION ADD-ON: CPT | Performed by: INTERNAL MEDICINE

## 2020-08-02 PROCEDURE — 87210 SMEAR WET MOUNT SALINE/INK: CPT | Performed by: INTERNAL MEDICINE

## 2020-08-02 PROCEDURE — 85025 COMPLETE CBC W/AUTO DIFF WBC: CPT | Performed by: INTERNAL MEDICINE

## 2020-08-02 PROCEDURE — 96374 THER/PROPH/DIAG INJ IV PUSH: CPT | Performed by: INTERNAL MEDICINE

## 2020-08-02 PROCEDURE — 99285 EMERGENCY DEPT VISIT HI MDM: CPT | Mod: 25 | Performed by: INTERNAL MEDICINE

## 2020-08-02 PROCEDURE — 84703 CHORIONIC GONADOTROPIN ASSAY: CPT | Performed by: INTERNAL MEDICINE

## 2020-08-02 PROCEDURE — 85610 PROTHROMBIN TIME: CPT | Performed by: INTERNAL MEDICINE

## 2020-08-02 PROCEDURE — 86140 C-REACTIVE PROTEIN: CPT | Performed by: INTERNAL MEDICINE

## 2020-08-02 PROCEDURE — 81001 URINALYSIS AUTO W/SCOPE: CPT | Performed by: INTERNAL MEDICINE

## 2020-08-02 PROCEDURE — 80048 BASIC METABOLIC PNL TOTAL CA: CPT | Performed by: INTERNAL MEDICINE

## 2020-08-02 PROCEDURE — 87086 URINE CULTURE/COLONY COUNT: CPT | Performed by: INTERNAL MEDICINE

## 2020-08-02 PROCEDURE — 87491 CHLMYD TRACH DNA AMP PROBE: CPT | Performed by: INTERNAL MEDICINE

## 2020-08-02 PROCEDURE — 87591 N.GONORRHOEAE DNA AMP PROB: CPT | Performed by: INTERNAL MEDICINE

## 2020-08-02 PROCEDURE — 99284 EMERGENCY DEPT VISIT MOD MDM: CPT | Mod: Z6 | Performed by: INTERNAL MEDICINE

## 2020-08-02 PROCEDURE — 25800030 ZZH RX IP 258 OP 636: Performed by: INTERNAL MEDICINE

## 2020-08-02 PROCEDURE — 25000128 H RX IP 250 OP 636: Performed by: INTERNAL MEDICINE

## 2020-08-02 RX ORDER — ONDANSETRON 8 MG/1
8 TABLET, ORALLY DISINTEGRATING ORAL PRN
COMMUNITY
End: 2022-01-18

## 2020-08-02 RX ORDER — SODIUM CHLORIDE 9 MG/ML
INJECTION, SOLUTION INTRAVENOUS CONTINUOUS
Status: DISCONTINUED | OUTPATIENT
Start: 2020-08-02 | End: 2020-08-02 | Stop reason: HOSPADM

## 2020-08-02 RX ORDER — PROPRANOLOL HYDROCHLORIDE 10 MG/1
10 TABLET ORAL 3 TIMES DAILY
COMMUNITY
End: 2021-11-16

## 2020-08-02 RX ORDER — NAPROXEN 500 MG/1
500 TABLET ORAL 2 TIMES DAILY WITH MEALS
Qty: 16 TABLET | Refills: 0 | Status: SHIPPED | OUTPATIENT
Start: 2020-08-02 | End: 2020-08-10

## 2020-08-02 RX ORDER — ONDANSETRON 4 MG/1
4 TABLET, ORALLY DISINTEGRATING ORAL EVERY 8 HOURS PRN
Qty: 10 TABLET | Refills: 0 | Status: SHIPPED | OUTPATIENT
Start: 2020-08-02 | End: 2020-11-18

## 2020-08-02 RX ORDER — FLUOXETINE 40 MG/1
40 CAPSULE ORAL DAILY
COMMUNITY
End: 2021-03-10

## 2020-08-02 RX ORDER — KETOROLAC TROMETHAMINE 30 MG/ML
30 INJECTION, SOLUTION INTRAMUSCULAR; INTRAVENOUS ONCE
Status: COMPLETED | OUTPATIENT
Start: 2020-08-02 | End: 2020-08-02

## 2020-08-02 RX ORDER — HYDROCODONE BITARTRATE AND ACETAMINOPHEN 5; 325 MG/1; MG/1
1 TABLET ORAL ONCE
Status: COMPLETED | OUTPATIENT
Start: 2020-08-02 | End: 2020-08-02

## 2020-08-02 RX ORDER — OMEPRAZOLE 20 MG/1
20 TABLET, DELAYED RELEASE ORAL DAILY
COMMUNITY
End: 2021-01-13

## 2020-08-02 RX ORDER — HYDROCODONE BITARTRATE AND ACETAMINOPHEN 5; 325 MG/1; MG/1
1 TABLET ORAL EVERY 6 HOURS PRN
Qty: 10 TABLET | Refills: 0 | Status: SHIPPED | OUTPATIENT
Start: 2020-08-02 | End: 2020-08-05

## 2020-08-02 RX ORDER — ONDANSETRON 2 MG/ML
4 INJECTION INTRAMUSCULAR; INTRAVENOUS EVERY 30 MIN PRN
Status: DISCONTINUED | OUTPATIENT
Start: 2020-08-02 | End: 2020-08-02 | Stop reason: HOSPADM

## 2020-08-02 RX ADMIN — SODIUM CHLORIDE 1000 ML: 9 INJECTION, SOLUTION INTRAVENOUS at 17:26

## 2020-08-02 RX ADMIN — KETOROLAC TROMETHAMINE 30 MG: 30 INJECTION, SOLUTION INTRAMUSCULAR at 18:33

## 2020-08-02 RX ADMIN — HYDROCODONE BITARTRATE AND ACETAMINOPHEN 1 TABLET: 5; 325 TABLET ORAL at 20:03

## 2020-08-02 ASSESSMENT — ENCOUNTER SYMPTOMS
DIARRHEA: 0
ADENOPATHY: 0
NUMBNESS: 0
HEADACHES: 0
VOMITING: 0
BACK PAIN: 0
FLANK PAIN: 1
CONFUSION: 0
WEAKNESS: 0
NECK PAIN: 0
COUGH: 0
WHEEZING: 0
NAUSEA: 1
LIGHT-HEADEDNESS: 0
ABDOMINAL PAIN: 1
DYSURIA: 1
CHILLS: 0
SHORTNESS OF BREATH: 0
FEVER: 0

## 2020-08-02 ASSESSMENT — MIFFLIN-ST. JEOR: SCORE: 1399.91

## 2020-08-02 NOTE — ED TRIAGE NOTES
Patient presents ambulatory to triage with c/o pelvic pain and nausea. Patient reports pelvic pain (R>L), nausea, and bladder pain with urination. Denies blood in urine or discharge. Hx of ovarian cyst with surgery on 7/1/20.

## 2020-08-02 NOTE — ED AVS SNAPSHOT
[facility], [place], Emergency Department  [address] [place] [id number]  Phone:  [phone]                                    Do Hassan   MRN: [id number]    Department:  [facility], Emergency Department   Date of Visit:  8/2/2020           After Visit Summary Signature Page    I have received my discharge instructions, and my questions have been answered. I have discussed any challenges I see with this plan with the nurse or doctor.    ..........................................................................................................................................  Patient/Patient Representative Signature      ..........................................................................................................................................  Patient Representative Print Name and Relationship to Patient    ..................................................               ................................................  Date                                   Time    ..........................................................................................................................................  Reviewed by Signature/Title    ...................................................              ..............................................  Date                                               Time          22EPIC Rev 08/18

## 2020-08-02 NOTE — ED PROVIDER NOTES
ED Provider Note  [facility]      History     Chief Complaint   Patient presents with     Pelvic Pain     Nausea     HPI  Do Hassan is a 30 year old female who presents with right lower quadrant and suprapubic abdominal pain somewhat reminiscent of past pain from ovarian cysts and after surgery from ovarian cystectomy. She had a right ovarian cystectomy at [facility] in [place] on 7/1/20. She had quite a bit of pain post operatively, but had been doing well until the last day or so. She has nausea, but no vomiting. She has mild right flank pain. She denies fever, chills, URI symptoms, cough, shortness of breath, chest pain. She has bladder pain with urination, but no burning or blood. She has no leg pain or swelling. Past medical history of Óscar-Danlos, recurrent ovarian cysts, POTS, endometriosis, hernia repair and right hip surgery. She as had prior appendectomy. Records from her recent surgery are not in Care Everywhere. Her last normal period occurred over 2 years ago before her last pregnancy.    Past Medical History  Past Medical History:   Diagnosis Date     Óscar-Danlos syndrome      Endometriosis      Intussusception (H) 1996     POTS (postural orthostatic tachycardia syndrome)      Spina bifida occulta      Past Surgical History:   Procedure Laterality Date     anterior posterior spinal fusion  2008    L5-S1     APPENDECTOMY       CYSTECTOMY OVARIAN BENIGN  2020    left     DAVINCI ASSISTED ABLATION / EXCISION OF ENDOMETRIOSIS  2017     HERNIA REPAIR       ORTHOPEDIC SURGERY       FLUoxetine (PROZAC) 40 MG capsule  HYDROcodone-acetaminophen (NORCO) 5-325 MG tablet  naproxen (NAPROSYN) 500 MG tablet  omeprazole (PRILOSEC OTC) 20 MG EC tablet  ondansetron (ZOFRAN ODT) 4 MG ODT tab  ondansetron (ZOFRAN-ODT) 8 MG ODT tab  propranolol (INDERAL) 10 MG tablet      Allergies   Allergen Reactions     Contrast Dye Nausea     Past medical history, past surgical history,  "medications, and allergies were reviewed with the patient. Additional pertinent items: None    Family History  No family history on file.  Family history was reviewed with the patient. 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 Additional pertinent items: None    Review of Systems   Constitutional: Negative for chills and fever.   HENT: Negative for congestion.    Eyes: Negative for visual disturbance.   Respiratory: Negative for cough, shortness of breath and wheezing.    Cardiovascular: Negative for chest pain.   Gastrointestinal: Positive for abdominal pain and nausea. Negative for diarrhea and vomiting.   Genitourinary: Positive for dysuria, flank pain and pelvic pain. Negative for vaginal bleeding and vaginal discharge.   Musculoskeletal: Negative for back pain and neck pain.   Skin: Negative for rash.   Neurological: Negative for weakness, light-headedness, numbness and headaches.   Hematological: Negative for adenopathy.   Psychiatric/Behavioral: Negative for confusion.         Physical Exam   BP: 111/79  Pulse: 70  Temp: 98.1  F (36.7  C)  Resp: 16  Height: 162.6 cm (5' 4\")  Weight: 69.5 kg (153 lb 3.2 oz)(scale)  SpO2: 98 %  Physical Exam  Vitals signs and nursing note reviewed. Exam conducted with a chaperone present.   Constitutional:       Appearance: Normal appearance.   HENT:      Head: Normocephalic and atraumatic.      Right Ear: External ear normal.      Left Ear: External ear normal.      Nose: Nose normal.      Mouth/Throat:      Mouth: Mucous membranes are moist.   Eyes:      Extraocular Movements: Extraocular movements intact.      Pupils: Pupils are equal, round, and reactive to light.   Neck:      Musculoskeletal: Normal range of motion. "   Cardiovascular:      Rate and Rhythm: Normal rate and regular rhythm.      Heart sounds: No murmur.   Pulmonary:      Effort: Pulmonary effort is normal.      Breath sounds: Normal breath sounds. No wheezing.   Abdominal:      Tenderness: There is abdominal tenderness in the suprapubic area. There is no guarding.   Genitourinary:     General: Normal vulva.      Exam position: Knee-chest position.      Pubic Area: No rash.       Labia:         Right: No lesion.         Left: No lesion.       Vagina: Vaginal discharge (miminal curd like) present.      Cervix: Cervical motion tenderness (mild) present.      Uterus: Tender. Not enlarged.       Adnexa:         Right: No mass or tenderness.          Left: No mass or tenderness.     Musculoskeletal:      Right lower leg: No edema.      Left lower leg: No edema.   Skin:     General: Skin is warm and dry.   Neurological:      General: No focal deficit present.      Mental Status: She is alert and oriented to person, place, and time.      Cranial Nerves: No cranial nerve deficit.   Psychiatric:         Mood and Affect: Mood normal.         Behavior: Behavior normal.         ED Course      Procedures          Labs/Imaging    Results for orders placed or performed during the hospital encounter of 08/02/20 (from the past 24 hour(s))   CBC with platelets differential   Result Value Ref Range    WBC 9.9 4.0 - 11.0 10e9/L    RBC Count 4.52 3.8 - 5.2 10e12/L    Hemoglobin 13.6 11.7 - 15.7 g/dL    Hematocrit 40.4 35.0 - 47.0 %    MCV 89 78 - 100 fl    MCH 30.1 26.5 - 33.0 pg    MCHC 33.7 31.5 - 36.5 g/dL    RDW 12.5 10.0 - 15.0 %    Platelet Count 328 150 - 450 10e9/L    Diff Method Automated Method     % Neutrophils 63.6 %    % Lymphocytes 26.7 %    % Monocytes 5.4 %    % Eosinophils 3.2 %    % Basophils 0.7 %    % Immature Granulocytes 0.4 %    Nucleated RBCs 0 0 /100    Absolute Neutrophil 6.3 1.6 - 8.3 10e9/L    Absolute Lymphocytes 2.6 0.8 - 5.3 10e9/L    Absolute Monocytes  0.5 0.0 - 1.3 10e9/L    Absolute Eosinophils 0.3 0.0 - 0.7 10e9/L    Absolute Basophils 0.1 0.0 - 0.2 10e9/L    Abs Immature Granulocytes 0.0 0 - 0.4 10e9/L    Absolute Nucleated RBC 0.0    Basic metabolic panel   Result Value Ref Range    Sodium 137 133 - 144 mmol/L    Potassium 4.0 3.4 - 5.3 mmol/L    Chloride 105 94 - 109 mmol/L    Carbon Dioxide 28 20 - 32 mmol/L    Anion Gap 4 3 - 14 mmol/L    Glucose 91 70 - 99 mg/dL    Urea Nitrogen 14 7 - 30 mg/dL    Creatinine 0.79 0.52 - 1.04 mg/dL    GFR Estimate >90 >60 mL/min/[1.73_m2]    GFR Estimate If Black >90 >60 mL/min/[1.73_m2]    Calcium 9.3 8.5 - 10.1 mg/dL   INR   Result Value Ref Range    INR 1.06 0.86 - 1.14   CRP inflammation   Result Value Ref Range    CRP Inflammation <2.9 0.0 - 8.0 mg/L   Lactic acid whole blood   Result Value Ref Range    Lactic Acid 0.5 (L) 0.7 - 2.0 mmol/L   HCG qualitative Blood   Result Value Ref Range    HCG Qualitative Serum Negative NEG^Negative   US Pelvis Cmplt w Transvag & Doppler LmtPel Duplex Limited    Narrative    EXAMINATION: US PELVIS COMPLETE W TRANSVAGINAL AND DOPPLER LIMITED,  8/2/2020 6:05 PM     COMPARISON: None.    HISTORY: History of left cystectomy at outside hospital (records not  available)    TECHNIQUE: The pelvis was scanned in standard fashion with  transabdominal and transvaginal transducer(s) using both grey scale  and color Doppler techniques.    FINDINGS  The uterus measures 7.9 x 4.4 x 3.3 cm, and there is no evidence of a  focal fibroid.  The endometrium is within normal limits and measures 5  mm. There is no free fluid in the pelvis.    The right ovary measures 2.5 x 2.4 x 1.2 cm and the left ovary  measures 2.9 x 3.1 x 1.9 cm. Left-sided corpus luteal cyst. There is  no adnexal mass. There is normal blood flow to the ovaries.      Impression    IMPRESSION: Left corpus luteal cyst, otherwise unremarkable pelvic  ultrasound.    I have personally reviewed the examination and initial interpretation  and  I agree with the findings.    EMILY CHONG, DO   UA with Microscopic   Result Value Ref Range    Color Urine Light Yellow     Appearance Urine Clear     Glucose Urine Negative NEG^Negative mg/dL    Bilirubin Urine Negative NEG^Negative    Ketones Urine Negative NEG^Negative mg/dL    Specific Gravity Urine 1.008 1.003 - 1.035    Blood Urine Negative NEG^Negative    pH Urine 5.5 5.0 - 7.0 pH    Protein Albumin Urine Negative NEG^Negative mg/dL    Urobilinogen mg/dL Normal 0.0 - 2.0 mg/dL    Nitrite Urine Negative NEG^Negative    Leukocyte Esterase Urine Negative NEG^Negative    Source Midstream Urine     WBC Urine <1 0 - 5 /HPF    RBC Urine <1 0 - 2 /HPF    Bacteria Urine Few (A) NEG^Negative /HPF    Mucous Urine Present (A) NEG^Negative /LPF   Wet prep    Specimen: Cervix   Result Value Ref Range    Specimen Description Cervix     Wet Prep No Trichomonas seen     Wet Prep No clue cells seen     Wet Prep No encapsulated yeast seen     Wet Prep No PMNs seen              Medications   0.9% sodium chloride BOLUS (0 mLs Intravenous Stopped 8/2/20 1833)     Followed by   sodium chloride 0.9% infusion (has no administration in time range)   ondansetron (ZOFRAN) injection 4 mg (0 mg Intravenous Hold 8/2/20 1833)   HYDROcodone-acetaminophen (NORCO) 5-325 MG per tablet 1 tablet (has no administration in time range)   ketorolac (TORADOL) injection 30 mg (30 mg Intravenous Given 8/2/20 1833)        Assessments & Plan (with Medical Decision Making)   Impression:  Young female with history of Óscar Danlos, endometriosis, frequent ovarian cysts, recent laparoscopic ovarian cystectomy, and prior appendectomy presents with pelvic pain and nausea. The pain is similar to that during recovery from the cyst surgery. She has sensation of pressure on her bladder. She has unremarkable pelvic US with doppler of the ovaries. There is a simple corpus luteum cyst on the left ovary. There were no unusual fluid collections. She has midline  tenderness on pelvic exam without masses. UA is normal. CBC, CRP, lactate are normal. Serum pregnancy test is negative. Vaginal secretions were somewhat curd-like but wet prep was negative. At this point there is no evidence of large ovarian cyst, hemorrhagic cyst, pelvic fluid collection or infectious process. She will be offered pain medication and zofran for nausea. Recurrent endometriosis is certainly a consideration. She should follow up with her primary OB/Gyn when she returns home in about 1 week. She can follow up at [facility] or return of symptoms persist or worsen.    I have reviewed the nursing notes. I have reviewed the findings, diagnosis, plan and need for follow up with the patient.    New Prescriptions    HYDROCODONE-ACETAMINOPHEN (NORCO) 5-325 MG TABLET    Take 1 tablet by mouth every 6 hours as needed for moderate to severe pain    NAPROXEN (NAPROSYN) 500 MG TABLET    Take 1 tablet (500 mg) by mouth 2 times daily (with meals) for 8 days    ONDANSETRON (ZOFRAN ODT) 4 MG ODT TAB    Take 1 tablet (4 mg) by mouth every 8 hours as needed for nausea or vomiting       Final diagnoses:   Pelvic pain in female       --  KYM CABELLO MD   Emergency Medicine   [facility], [place], EMERGENCY DEPARTMENT  8/2/2020     Kym Cabello MD  08/02/20 1954

## 2020-08-03 LAB
C TRACH DNA SPEC QL NAA+PROBE: NEGATIVE
N GONORRHOEA DNA SPEC QL NAA+PROBE: NEGATIVE
SPECIMEN SOURCE: NORMAL
SPECIMEN SOURCE: NORMAL

## 2020-08-03 NOTE — RESULT ENCOUNTER NOTE
Final result for both N. Gonorrhoeae PCR and Chlamydia Trachomatis PCR are NEGATIVE.  No treatment or change in treatment per [facility] ED Lab Result protocol

## 2020-08-03 NOTE — DISCHARGE INSTRUCTIONS
Continue zofran every 6 hours as needed for nausea.  Naproxen 500 mg twice/ day with food as needed for pain.  Vicodin 1 every 6 hours as needed for pain.  Follow up with your regular OB/Gyn doctor when you return home.  Please make an appointment to follow up with OB/Gyn - [facility] (phone: [phone]) or [facility] [phone] if you need follow up sooner.

## 2020-08-04 LAB
BACTERIA SPEC CULT: NO GROWTH
Lab: NORMAL
SPECIMEN SOURCE: NORMAL

## 2020-08-04 NOTE — RESULT ENCOUNTER NOTE
Final urine culture report is NEGATIVE per [facility] ED Lab Result protocol.    If NEGATIVE result, no change in treatment, per [facility] ED Lab Result protocol.

## 2020-09-20 ENCOUNTER — EMERGENCY (EMERGENCY)
Facility: HOSPITAL | Age: 30
LOS: 1 days | Discharge: HOME | End: 2020-09-20
Attending: EMERGENCY MEDICINE | Admitting: EMERGENCY MEDICINE
Payer: COMMERCIAL

## 2020-09-20 VITALS
OXYGEN SATURATION: 99 % | DIASTOLIC BLOOD PRESSURE: 60 MMHG | SYSTOLIC BLOOD PRESSURE: 100 MMHG | RESPIRATION RATE: 18 BRPM | HEART RATE: 60 BPM

## 2020-09-20 VITALS
TEMPERATURE: 100 F | RESPIRATION RATE: 16 BRPM | OXYGEN SATURATION: 97 % | SYSTOLIC BLOOD PRESSURE: 111 MMHG | HEIGHT: 64 IN | WEIGHT: 151.9 LBS | HEART RATE: 75 BPM | DIASTOLIC BLOOD PRESSURE: 72 MMHG

## 2020-09-20 DIAGNOSIS — Z88.8 ALLERGY STATUS TO OTHER DRUGS, MEDICAMENTS AND BIOLOGICAL SUBSTANCES: ICD-10-CM

## 2020-09-20 DIAGNOSIS — R10.2 PELVIC AND PERINEAL PAIN: ICD-10-CM

## 2020-09-20 DIAGNOSIS — Z98.1 ARTHRODESIS STATUS: Chronic | ICD-10-CM

## 2020-09-20 DIAGNOSIS — R10.30 LOWER ABDOMINAL PAIN, UNSPECIFIED: ICD-10-CM

## 2020-09-20 LAB
ALBUMIN SERPL ELPH-MCNC: 4.9 G/DL — SIGNIFICANT CHANGE UP (ref 3.3–5)
ALP SERPL-CCNC: 55 U/L — SIGNIFICANT CHANGE UP (ref 40–120)
ALT FLD-CCNC: 11 U/L — SIGNIFICANT CHANGE UP (ref 10–45)
ANION GAP SERPL CALC-SCNC: 10 MMOL/L — SIGNIFICANT CHANGE UP (ref 5–17)
APPEARANCE UR: CLEAR — SIGNIFICANT CHANGE UP
AST SERPL-CCNC: 13 U/L — SIGNIFICANT CHANGE UP (ref 10–40)
BACTERIA # UR AUTO: PRESENT /HPF
BASOPHILS # BLD AUTO: 0.05 K/UL — SIGNIFICANT CHANGE UP (ref 0–0.2)
BASOPHILS NFR BLD AUTO: 0.9 % — SIGNIFICANT CHANGE UP (ref 0–2)
BILIRUB SERPL-MCNC: 0.4 MG/DL — SIGNIFICANT CHANGE UP (ref 0.2–1.2)
BILIRUB UR-MCNC: NEGATIVE — SIGNIFICANT CHANGE UP
BLD GP AB SCN SERPL QL: NEGATIVE — SIGNIFICANT CHANGE UP
BUN SERPL-MCNC: 9 MG/DL — SIGNIFICANT CHANGE UP (ref 7–23)
CALCIUM SERPL-MCNC: 9.9 MG/DL — SIGNIFICANT CHANGE UP (ref 8.4–10.5)
CHLORIDE SERPL-SCNC: 101 MMOL/L — SIGNIFICANT CHANGE UP (ref 96–108)
CO2 SERPL-SCNC: 26 MMOL/L — SIGNIFICANT CHANGE UP (ref 22–31)
COLOR SPEC: YELLOW — SIGNIFICANT CHANGE UP
COMMENT - URINE: SIGNIFICANT CHANGE UP
CREAT SERPL-MCNC: 0.74 MG/DL — SIGNIFICANT CHANGE UP (ref 0.5–1.3)
DIFF PNL FLD: NEGATIVE — SIGNIFICANT CHANGE UP
EOSINOPHIL # BLD AUTO: 0.24 K/UL — SIGNIFICANT CHANGE UP (ref 0–0.5)
EOSINOPHIL NFR BLD AUTO: 4.1 % — SIGNIFICANT CHANGE UP (ref 0–6)
EPI CELLS # UR: SIGNIFICANT CHANGE UP /HPF (ref 0–5)
GLUCOSE SERPL-MCNC: 102 MG/DL — HIGH (ref 70–99)
GLUCOSE UR QL: NEGATIVE — SIGNIFICANT CHANGE UP
HCG SERPL-ACNC: <0 MIU/ML — SIGNIFICANT CHANGE UP
HCT VFR BLD CALC: 42 % — SIGNIFICANT CHANGE UP (ref 34.5–45)
HGB BLD-MCNC: 13.8 G/DL — SIGNIFICANT CHANGE UP (ref 11.5–15.5)
IMM GRANULOCYTES NFR BLD AUTO: 0.3 % — SIGNIFICANT CHANGE UP (ref 0–1.5)
KETONES UR-MCNC: NEGATIVE — SIGNIFICANT CHANGE UP
LEUKOCYTE ESTERASE UR-ACNC: ABNORMAL
LIDOCAIN IGE QN: 29 U/L — SIGNIFICANT CHANGE UP (ref 7–60)
LYMPHOCYTES # BLD AUTO: 1.95 K/UL — SIGNIFICANT CHANGE UP (ref 1–3.3)
LYMPHOCYTES # BLD AUTO: 33.4 % — SIGNIFICANT CHANGE UP (ref 13–44)
MCHC RBC-ENTMCNC: 29.6 PG — SIGNIFICANT CHANGE UP (ref 27–34)
MCHC RBC-ENTMCNC: 32.9 GM/DL — SIGNIFICANT CHANGE UP (ref 32–36)
MCV RBC AUTO: 90.1 FL — SIGNIFICANT CHANGE UP (ref 80–100)
MONOCYTES # BLD AUTO: 0.4 K/UL — SIGNIFICANT CHANGE UP (ref 0–0.9)
MONOCYTES NFR BLD AUTO: 6.8 % — SIGNIFICANT CHANGE UP (ref 2–14)
NEUTROPHILS # BLD AUTO: 3.18 K/UL — SIGNIFICANT CHANGE UP (ref 1.8–7.4)
NEUTROPHILS NFR BLD AUTO: 54.5 % — SIGNIFICANT CHANGE UP (ref 43–77)
NITRITE UR-MCNC: NEGATIVE — SIGNIFICANT CHANGE UP
NRBC # BLD: 0 /100 WBCS — SIGNIFICANT CHANGE UP (ref 0–0)
PH UR: 6.5 — SIGNIFICANT CHANGE UP (ref 5–8)
PLATELET # BLD AUTO: 317 K/UL — SIGNIFICANT CHANGE UP (ref 150–400)
POTASSIUM SERPL-MCNC: 4.4 MMOL/L — SIGNIFICANT CHANGE UP (ref 3.5–5.3)
POTASSIUM SERPL-SCNC: 4.4 MMOL/L — SIGNIFICANT CHANGE UP (ref 3.5–5.3)
PROT SERPL-MCNC: 7.8 G/DL — SIGNIFICANT CHANGE UP (ref 6–8.3)
PROT UR-MCNC: NEGATIVE MG/DL — SIGNIFICANT CHANGE UP
RBC # BLD: 4.66 M/UL — SIGNIFICANT CHANGE UP (ref 3.8–5.2)
RBC # FLD: 12.3 % — SIGNIFICANT CHANGE UP (ref 10.3–14.5)
RBC CASTS # UR COMP ASSIST: < 5 /HPF — SIGNIFICANT CHANGE UP
RH IG SCN BLD-IMP: POSITIVE — SIGNIFICANT CHANGE UP
SODIUM SERPL-SCNC: 137 MMOL/L — SIGNIFICANT CHANGE UP (ref 135–145)
SP GR SPEC: 1.01 — SIGNIFICANT CHANGE UP (ref 1–1.03)
UROBILINOGEN FLD QL: 0.2 E.U./DL — SIGNIFICANT CHANGE UP
WBC # BLD: 5.84 K/UL — SIGNIFICANT CHANGE UP (ref 3.8–10.5)
WBC # FLD AUTO: 5.84 K/UL — SIGNIFICANT CHANGE UP (ref 3.8–10.5)
WBC UR QL: < 5 /HPF — SIGNIFICANT CHANGE UP

## 2020-09-20 PROCEDURE — 74177 CT ABD & PELVIS W/CONTRAST: CPT | Mod: 26

## 2020-09-20 PROCEDURE — 96375 TX/PRO/DX INJ NEW DRUG ADDON: CPT

## 2020-09-20 PROCEDURE — 74177 CT ABD & PELVIS W/CONTRAST: CPT

## 2020-09-20 PROCEDURE — 76830 TRANSVAGINAL US NON-OB: CPT

## 2020-09-20 PROCEDURE — 99284 EMERGENCY DEPT VISIT MOD MDM: CPT | Mod: 25

## 2020-09-20 PROCEDURE — 86900 BLOOD TYPING SEROLOGIC ABO: CPT

## 2020-09-20 PROCEDURE — 96376 TX/PRO/DX INJ SAME DRUG ADON: CPT

## 2020-09-20 PROCEDURE — 86901 BLOOD TYPING SEROLOGIC RH(D): CPT

## 2020-09-20 PROCEDURE — 85025 COMPLETE CBC W/AUTO DIFF WBC: CPT

## 2020-09-20 PROCEDURE — 80053 COMPREHEN METABOLIC PANEL: CPT

## 2020-09-20 PROCEDURE — 76856 US EXAM PELVIC COMPLETE: CPT | Mod: 26

## 2020-09-20 PROCEDURE — 83690 ASSAY OF LIPASE: CPT

## 2020-09-20 PROCEDURE — 99285 EMERGENCY DEPT VISIT HI MDM: CPT

## 2020-09-20 PROCEDURE — 84702 CHORIONIC GONADOTROPIN TEST: CPT

## 2020-09-20 PROCEDURE — 76830 TRANSVAGINAL US NON-OB: CPT | Mod: 26

## 2020-09-20 PROCEDURE — 81001 URINALYSIS AUTO W/SCOPE: CPT

## 2020-09-20 PROCEDURE — 87086 URINE CULTURE/COLONY COUNT: CPT

## 2020-09-20 PROCEDURE — 96374 THER/PROPH/DIAG INJ IV PUSH: CPT | Mod: XU

## 2020-09-20 PROCEDURE — 36415 COLL VENOUS BLD VENIPUNCTURE: CPT

## 2020-09-20 PROCEDURE — 76856 US EXAM PELVIC COMPLETE: CPT

## 2020-09-20 PROCEDURE — 86850 RBC ANTIBODY SCREEN: CPT

## 2020-09-20 RX ORDER — ONDANSETRON 8 MG/1
4 TABLET, FILM COATED ORAL ONCE
Refills: 0 | Status: COMPLETED | OUTPATIENT
Start: 2020-09-20 | End: 2020-09-20

## 2020-09-20 RX ORDER — MORPHINE SULFATE 50 MG/1
4 CAPSULE, EXTENDED RELEASE ORAL ONCE
Refills: 0 | Status: DISCONTINUED | OUTPATIENT
Start: 2020-09-20 | End: 2020-09-20

## 2020-09-20 RX ORDER — IOHEXOL 300 MG/ML
30 INJECTION, SOLUTION INTRAVENOUS ONCE
Refills: 0 | Status: COMPLETED | OUTPATIENT
Start: 2020-09-20 | End: 2020-09-20

## 2020-09-20 RX ORDER — SODIUM CHLORIDE 9 MG/ML
1000 INJECTION INTRAMUSCULAR; INTRAVENOUS; SUBCUTANEOUS ONCE
Refills: 0 | Status: COMPLETED | OUTPATIENT
Start: 2020-09-20 | End: 2020-09-20

## 2020-09-20 RX ORDER — KETOROLAC TROMETHAMINE 30 MG/ML
15 SYRINGE (ML) INJECTION ONCE
Refills: 0 | Status: DISCONTINUED | OUTPATIENT
Start: 2020-09-20 | End: 2020-09-20

## 2020-09-20 RX ADMIN — ONDANSETRON 4 MILLIGRAM(S): 8 TABLET, FILM COATED ORAL at 11:10

## 2020-09-20 RX ADMIN — IOHEXOL 30 MILLILITER(S): 300 INJECTION, SOLUTION INTRAVENOUS at 10:05

## 2020-09-20 RX ADMIN — MORPHINE SULFATE 4 MILLIGRAM(S): 50 CAPSULE, EXTENDED RELEASE ORAL at 10:04

## 2020-09-20 RX ADMIN — MORPHINE SULFATE 4 MILLIGRAM(S): 50 CAPSULE, EXTENDED RELEASE ORAL at 10:09

## 2020-09-20 RX ADMIN — SODIUM CHLORIDE 1000 MILLILITER(S): 9 INJECTION INTRAMUSCULAR; INTRAVENOUS; SUBCUTANEOUS at 10:04

## 2020-09-20 RX ADMIN — SODIUM CHLORIDE 1000 MILLILITER(S): 9 INJECTION INTRAMUSCULAR; INTRAVENOUS; SUBCUTANEOUS at 10:09

## 2020-09-20 RX ADMIN — Medication 15 MILLIGRAM(S): at 15:28

## 2020-09-20 RX ADMIN — ONDANSETRON 4 MILLIGRAM(S): 8 TABLET, FILM COATED ORAL at 10:04

## 2020-09-20 NOTE — ED PROVIDER NOTE - OBJECTIVE STATEMENT
30 year old female with history of endometriosis, ovarian cysts, hernia repair presents to ED with concern for lower abdominal pain sudden in onset this morning which is now radiating throughout entire abdomen.  Patient notes this feels similar to what she has experienced with endometriosis in the past.  She denies associated fever, chills, chest pain, shortness of breath, nausea, emesis, changes to bowel movements, rashes, peripheral edema, or any additional acute complaints or concerns at this time.  She has not taken any medication for her discomfort prior to arrival in ED.

## 2020-09-20 NOTE — ED PROVIDER NOTE - PATIENT PORTAL LINK FT
You can access the FollowMyHealth Patient Portal offered by [facility] by registering at the following website: [email or web address]. By joining [facility]’s FollowMyHealth portal, you will also be able to view your health information using other applications (apps) compatible with our system.

## 2020-09-20 NOTE — ED PROVIDER NOTE - CARE PROVIDER_API CALL
Lulú Dubon  [facility]  [address] [place] [address], [place] [address]  Phone: [phone]  Fax: [phone]  Follow Up Time:

## 2020-09-20 NOTE — CONSULT NOTE ADULT - SUBJECTIVE AND OBJECTIVE BOX
31yo F  LMP  s/p vaginal delivery 2020 and laparoscopic dermoid cystectomy 4 months ago presented to ED this AM with severe sudden onset abdominal v pelvic pain.  Patient states that it occurred around 08:00 when she went to hold her new born.  Patient states it felt like a 'pop' and then pain in her left side that then spread diffusely to her abdomen and pelvis.  She received 4mg morphine in the ED and the pain subsided.  Denies any vaginal bleeding.  Patient recently stopped breast feeding and had her first period on  which was normal.  She is currently taking progesterone only pills for contraception.  She has known ovarian cysts and endometriosis throughout her life.  Pt denies fever, chills, chest pain, SOB, nausea, vomiting, vaginal bleeding.      OB/GYN Hx:  G1 - uncomplicated full term ; ovarian cysts s/p dermoid removal 2020, endometriosis s/p endo excision in   PMHx: Schulz, multiple hernias, edy danlos, endometriosis, depression, anxiety  SHx: l/s ovarian cystectomy 2020, l/s endometriosis excision   Meds: zoloft, propranolol, thc derivatives for pain  Allergies: nkda    PHYSICAL EXAM:   Vital Signs Last 24 Hrs  T(C): 37.6 (20 Sep 2020 09:13), Max: 37.6 (20 Sep 2020 09:13)  T(F): 99.6 (20 Sep 2020 09:13), Max: 99.6 (20 Sep 2020 09:13)  HR: 55 (20 Sep 2020 13:36) (55 - 75)  BP: 99/58 (20 Sep 2020 13:36) (99/58 - 111/72)  BP(mean): --  RR: 16 (20 Sep 2020 13:36) (16 - 16)  SpO2: 97% (20 Sep 2020 13:36) (97% - 97%)    **************************  Constitutional: Alert & Oriented x3, No acute distress  Respiratory: Clear to ausculation bilaterally; no wheezing, rhonchi, or crackles  Cardiovascular: regular rate and rhythm, no murmurs, or gallops  Gastrointestinal: soft, mild abdominal tenderness, positive bowel sounds, no rebound or guarding   Pelvic exam: no CMT, mild abdominal tenderness  Extremities: no calf tenderness or swelling      LABS:                        13.8   5.84  )-----------( 317      ( 20 Sep 2020 10:12 )             42.0         137  |  101  |  9   ----------------------------<  102<H>  4.4   |  26  |  0.74    Ca    9.9      20 Sep 2020 10:12    TPro  7.8  /  Alb  4.9  /  TBili  0.4  /  DBili  x   /  AST  13  /  ALT  11  /  AlkPhos  55        Urinalysis Basic - ( 20 Sep 2020 09:54 )    Color: Yellow / Appearance: Clear / S.010 / pH: x  Gluc: x / Ketone: NEGATIVE  / Bili: Negative / Urobili: 0.2 E.U./dL   Blood: x / Protein: NEGATIVE mg/dL / Nitrite: NEGATIVE   Leuk Esterase: Trace / RBC: < 5 /HPF / WBC < 5 /HPF   Sq Epi: x / Non Sq Epi: 0-5 /HPF / Bacteria: Present /HPF      HCG Quantitative, Serum: <0 mIU/mL ( @ 10:12)      RADIOLOGY & ADDITIONAL STUDIES:    ******PRELIMINARY REPORT******    ******PRELIMINARY REPORT******            EXAM:  US TRANSVAGINAL                          EXAM:  US PELVIC COMPLETE                          PROCEDURE DATE:  2020    ******PRELIMINARY REPORT******    ******PRELIMINARY REPORT******              INTERPRETATION:  PELVIC ULTRASOUND dated 2020 12:49 PM    INDICATION: Lower abdominal pain.  AGE: 30 LMP: 5-6 weeks prior.    TECHNIQUE: Transabdominal views of the pelvis were obtained followed by transvaginal views for better visualization of the ovaries.    PRIOR STUDIES: CT abdomen/pelvis from 20 and ultrasound pelvis from 2019.    FINDINGS:  These images demonstrate the uterus to be anteverted. The uterus is normal in size and shape, measuring 6.9 x 3.4 x 5 point cm. No myometrial abnormalities are seen. The endometrium is 0.4 cm in thickness, which is normal.    The right ovary is normal in size, measuring 4.0 x 2.4 x 3.2 cm with a calculated volume of 16 mL. 2.4 x 1.4 x 2.6 cm irregular cystic structure in the right ovary likely collapsed corpus luteum cyst The left ovary is normal in size, measuring 3.4 x 1.2 x 1.2 cm with a calculated volume of 2.5 mL. Doppler evaluation demonstrates flow to both ovaries with no evidence of torsion.    Mild free fluid is seen in the cul-de-sac.      IMPRESSION:  No acute pathology.    Mild pelvic free fluid likely due to collapsed corpus luteum cyst.          Thank you for the opportunity to participate in the care of this patient.  ******PRELIMINARY REPORT******    ******PRELIMINARY REPORT******          JOSY GARCIA M.D., RADIOLOGY RESIDENT      ******PRELIMINARY REPORT******    ******PRELIMINARY REPORT******            EXAM:  US TRANSVAGINAL                          EXAM:  US PELVIC COMPLETE                          PROCEDURE DATE:  2020    ******PRELIMINARY REPORT******    ******PRELIMINARY REPORT******              INTERPRETATION:  PELVIC ULTRASOUND dated 2020 12:49 PM    INDICATION: Lower abdominal pain.  AGE: 30 LMP: 5-6 weeks prior.    TECHNIQUE: Transabdominal views of the pelvis were obtained followed by transvaginal views for better visualization of the ovaries.    PRIOR STUDIES: CT abdomen/pelvis from 20 and ultrasound pelvis from 2019.    FINDINGS:  These images demonstrate the uterus to be anteverted. The uterus is normal in size and shape, measuring 6.9 x 3.4 x 5 point cm. No myometrial abnormalities are seen. The endometrium is 0.4 cm in thickness, which is normal.    The right ovary is normal in size, measuring 4.0 x 2.4 x 3.2 cm with a calculated volume of 16 mL. 2.4 x 1.4 x 2.6 cm irregular cystic structure in the right ovary likely collapsed corpus luteum cyst The left ovary is normal in size, measuring 3.4 x 1.2 x 1.2 cm with a calculated volume of 2.5 mL. Doppler evaluation demonstrates flow to both ovaries with no evidence of torsion.    Mild free fluid is seen in the cul-de-sac.      IMPRESSION:  No acute pathology.    Mild pelvic free fluid likely due to collapsed corpus luteum cyst.          Thank you for the opportunity to participate in the care of this patient.  ******PRELIMINARY REPORT******    ******PRELIMINARY REPORT******          JOSY GARCIA M.D., RADIOLOGY RESIDENT

## 2020-09-20 NOTE — CONSULT NOTE ADULT - ASSESSMENT
29yo  presents with abdominal pain that was sudden onset this AM and gradually improving.  Physical exam is not concerning, mild abdominal tenderness.  Vitals stable, labs within normal limits.  CT unremarkable.   TVUS remarkable for mild free fluid in pelvix.    Suspected ruptured ovarian cyst:  pt has a hx of multiple ovarian cysts, mild free fluid in pelvis consistent with a cyst rupturing.  Pain is improving.  - Toradol  - Pt counseled on switching to a contraceptive that can help with cysts/endometriosis; pt opted to continue with progesterone only pills at this time  - Pt has appointment with Dr. Cordon this upcoming Thursday to discuss contraceptives/pain management regarding her ovarian cysts and endometriosis  - Pt instructed to take tylenol and motrin as needed at home  - Pt instructed to rest   - Pt is agreeable to plan   - No acute GYN intervention necessary at this time    D/W Dr. Broussard

## 2020-09-20 NOTE — ED PROVIDER NOTE - PHYSICAL EXAMINATION
VITAL SIGNS: I have reviewed nursing notes and confirm.  CONSTITUTIONAL: Well-developed; well-nourished; in no acute distress.   SKIN:  warm and dry, no acute rash.   HEAD:  normocephalic, atraumatic.  EYES: PERRL.  EOM intact; conjunctiva and sclera clear.  ENT: No nasal discharge; airway clear.   NECK: Supple; non tender.  CARD: S1, S2 normal; no murmurs, gallops, or rubs. Regular rate and rhythm.   RESP:  Clear to auscultation b/l, no wheezes, rales or rhonchi.  ABD: Normal bowel sounds; soft; non-distended; + generalized tenderness on palpation throughout entire abdomen; no guarding/rebound.  EXT: Normal ROM. No clubbing, cyanosis or edema. 2+ pulses to b/l ue/le.  NEURO: Alert, oriented, grossly unremarkable.  5/5 strength x 4 extremities against gravity and external force.  No drift x 4 extremities.  Sensation intact and symmetric x 4 extremities.  No facial asymmetry.    PSYCH: Cooperative, mood and affect appropriate.

## 2020-09-20 NOTE — ED PROVIDER NOTE - NSFOLLOWUPINSTRUCTIONS_ED_ALL_ED_FT
Please follow up with your primary physician in 1-2 days for re evaluation.  Please return to ER immediately should your symptoms worsen or if you have any concern prior to this recommended follow up.          Endometriosis    WHAT YOU NEED TO KNOW:    Endometriosis is a condition in which tissue that is normally only in your uterus grows outside of the uterus. Endometriosis causes tissue that should be shed during a monthly period to grow on your ovaries, fallopian tubes, bladder, or other organs. Organs and tissue may stick together and cause inflammation and pain.    DISCHARGE INSTRUCTIONS:    Return to the emergency department if:   •You have severe pain that does not go away after you take pain medicine.          Contact your healthcare provider if:   •Your symptoms return after treatment.      •You have heavy or unusual vaginal bleeding.      •You see blood in your urine or bowel movement.      •You have questions or concerns about your condition or care.      Medicines:   •Hormones may help shrink endometrial tissue and decrease pain and inflammation. You may be given birth control pills, androgen hormones, or medicine that makes your body produce less of certain hormones.       •Acetaminophen decreases pain and is available without a doctor's order. Ask how much to take and how often to take it. Follow directions. Acetaminophen can cause liver damage if not taken correctly.      •NSAIDs, such as ibuprofen, help decrease swelling, pain, and fever. This medicine is available with or without a doctor's order. NSAIDs can cause stomach bleeding or kidney problems in certain people. If you take blood thinner medicine, always ask your healthcare provider if NSAIDs are safe for you. Always read the medicine label and follow directions.      •Take your medicine as directed. Contact your healthcare provider if you think your medicine is not helping or if you have side effects. Tell him or her if you are allergic to any medicine. Keep a list of the medicines, vitamins, and herbs you take. Include the amounts, and when and why you take them. Bring the list or the pill bottles to follow-up visits. Carry your medicine list with you in case of an emergency.      Self-care:   •Apply heat on your abdomen for 20 to 30 minutes every 2 hours for as many days as directed. Heat helps decrease pain and muscle spasms.      •Exercise regularly to help reduce symptoms, such as pain. Ask about the best exercise plan for you.       Follow up with your healthcare provider as directed: Write down your questions so you remember to ask them during your visits.        © Copyright [employer] 2020           back to top                          © Copyright [employer] 2020

## 2020-09-20 NOTE — ED PROVIDER NOTE - NS ED ROS FT
Constitutional: No fever or chills.   Eyes: No pain, blurry vision, or discharge.  ENMT: No hearing changes, pain, discharge or infections. No neck pain or stiffness.  Cardiac: No chest pain, SOB or edema. No chest pain with exertion.  Respiratory: No cough or respiratory distress. No hemoptysis. No history of asthma or RAD.  GI: No nausea, vomiting, diarrhea, + abdominal pain.  : No dysuria, frequency or burning.  MS: No myalgia, muscle weakness, joint pain or back pain.  Neuro: No headache or weakness. No LOC.  Skin: No skin rash.   Endocrine: No history of thyroid disease or diabetes.  Except as documented in the HPI, all other systems are negative.

## 2020-09-20 NOTE — ED ADULT TRIAGE NOTE - CHIEF COMPLAINT QUOTE
Pt BIBA from home CO RLQ pain with associated Nausea.  PT states "MY OBGYN is here and I've had ovarian cysts that needed surgery before."  PT denies vomiting, diarrhea, SOB, FEvers, CP and Dizziness.

## 2020-09-20 NOTE — ED PROVIDER NOTE - CLINICAL SUMMARY MEDICAL DECISION MAKING FREE TEXT BOX
Patient in ED w concern for pelvic discomfort today - similar in nature to what she has experienced with endometriosis and ovarian cysts in the past.  Non toxic.  Given pain meds, anti emetic and IVF on arrival to ED.  Labs reviewed and unremarkable.  CT abd/pel and US pelvis completed and both without evidence of acute process.  Patient is known to Dr. Cordon and gyn team is consulted in ED.  Gyn recommending NSAID use and outpatient followup.  No additional ED recs at this time.  Patient is aware of recommendations and in agreement to prompt outpatient follow up.  Patient is advised to follow up with their PCP in 1-2 days without fail.  Patient instructed to return to ED immediately should their symptoms worsen or if there is any concern prior to the recommended PCP follow up.  Patient is aware of plan and verbalizes their understanding.  Will discharge home at this time.

## 2020-09-20 NOTE — ED ADULT NURSE NOTE - CHPI ED NUR SYMPTOMS NEG
no vomiting/no burning urination/no diarrhea/no abdominal distension/no blood in stool/no chills/no dysuria/no fever/no hematuria

## 2020-09-21 LAB
CULTURE RESULTS: SIGNIFICANT CHANGE UP
SPECIMEN SOURCE: SIGNIFICANT CHANGE UP

## 2020-11-18 ENCOUNTER — OFFICE VISIT (OUTPATIENT)
Dept: MIDWIFE SERVICES | Facility: CLINIC | Age: 30
End: 2020-11-18
Payer: COMMERCIAL

## 2020-11-18 VITALS
SYSTOLIC BLOOD PRESSURE: 112 MMHG | DIASTOLIC BLOOD PRESSURE: 73 MMHG | TEMPERATURE: 98.7 F | HEIGHT: 64 IN | HEART RATE: 64 BPM | WEIGHT: 151.7 LBS | BODY MASS INDEX: 25.9 KG/M2

## 2020-11-18 DIAGNOSIS — Z30.431 IUD CHECK UP: Primary | ICD-10-CM

## 2020-11-18 PROCEDURE — 99202 OFFICE O/P NEW SF 15 MIN: CPT | Performed by: ADVANCED PRACTICE MIDWIFE

## 2020-11-18 ASSESSMENT — MIFFLIN-ST. JEOR: SCORE: 1393.11

## 2020-11-18 NOTE — Clinical Note
Pap smear done on this date: 1/1/2019 (approximately), by this group: [facility] [place], results were Normal

## 2020-11-18 NOTE — NURSING NOTE
"Chief Complaint   Patient presents with     IUD     iud check     Consult     talk about gyn issues and history       Initial /73   Pulse 64   Temp 98.7  F (37.1  C) (Oral)   Ht 1.626 m (5' 4\")   Wt 68.8 kg (151 lb 11.2 oz)   LMP 2020 (Exact Date)   BMI 26.04 kg/m   Estimated body mass index is 26.04 kg/m  as calculated from the following:    Height as of this encounter: 1.626 m (5' 4\").    Weight as of this encounter: 68.8 kg (151 lb 11.2 oz).  BP completed using cuff size: regular    Questioned patient about current smoking habits.  Pt. Former smoker          The following HM Due: pap smear      The following patient reported/Care Every where data was sent to:  P ABSTRACT QUALITY INITIATIVES [[id number]]  Pap smear done on this date: 2019 (approximately), by this group: [facility] [place], results were Normal.       patient has appointment for today              " 36.7

## 2020-12-31 ENCOUNTER — NURSE TRIAGE (OUTPATIENT)
Dept: NURSING | Facility: CLINIC | Age: 30
End: 2020-12-31

## 2020-12-31 ENCOUNTER — E-VISIT (OUTPATIENT)
Dept: URGENT CARE | Facility: URGENT CARE | Age: 30
End: 2020-12-31
Payer: COMMERCIAL

## 2020-12-31 DIAGNOSIS — Z20.822 SUSPECTED COVID-19 VIRUS INFECTION: Primary | ICD-10-CM

## 2020-12-31 PROCEDURE — 99421 OL DIG E/M SVC 5-10 MIN: CPT | Performed by: FAMILY MEDICINE

## 2020-12-31 NOTE — PATIENT INSTRUCTIONS
Dear Do Hassan,    Your symptoms show that you may have coronavirus (COVID-19). This illness can cause fever, cough and trouble breathing. Many people get a mild case and get better on their own. Some people can get very sick.    Will I be tested for COVID-19?  We would like to test you for Covid-19 virus. I have placed orders for this test.     To schedule: go to your [employer] home page and scroll down to the section that says  You have an appointment that needs to be scheduled  and click the large green button that says  Schedule Now  and follow the steps to find the next available openings.    If you are unable to complete these [employer] scheduling steps, please call [phone] to schedule your testing.     Return to work/school/ guidance:  Please let your workplace manager and staffing office know when your quarantine ends     We can t give you an exact date as it depends on the above. You can calculate this on your own or work with your manager/staffing office to calculate this. (For example if you were exposed on 10/4, you would have to quarantine for 14 full days. That would be through 10/18. You could return on 10/19.)      If you receive a positive COVID-19 test result, follow the guidance of the those who are giving you the results. Usually the return to work is 10 (or in some cases 20 days from symptom onset.) If you work at [employer], you must also be cleared by Employee Occupational Health and Safety to return to work.        If you receive a negative COVID-19 test result and did not have a high risk exposure to someone with a known positive COVID-19 test, you can return to work once you're free of fever for 24 hours without fever-reducing medication and your symptoms are improving or resolved.      If you receive a negative COVID-19 test and If you had a high risk exposure to someone who has tested positive for COVID-19 then you can return to work 14 days after your last contact  with the positive individual    Note: If you have ongoing exposure to the covid positive person, this quarantine period may be more than 14 days. (For example, if you are continued to be exposed to your child who tested positive and cannot isolate from them, then the quarantine of 7-14 days can't start until your child is no longer contagious. This is typically 10 days from onset of the child's symptoms. So the total duration may be 17-24 days in this case.)    Sign up for [employer].   We know it's scary to hear that you might have COVID-19. We want to track your symptoms to make sure you're okay over the next 2 weeks. Please look for an email from [employer]--this is a free, online program that we'll use to keep in touch. To sign up, follow the link in the email you will receive. Learn more at [email or web address]    How can I take care of myself?    Get lots of rest. Drink extra fluids (unless a doctor has told you not to)    Take Tylenol (acetaminophen) or ibuprofen for fever or pain. If you have liver or kidney problems, ask your family doctor if it's okay to take Tylenol o ibuprofen    If you have other health problems (like cancer, heart failure, an organ transplant or severe kidney disease): Call your specialty clinic if you don't feel better in the next 2 days.    Know when to call 911. Emergency warning signs include:  o Trouble breathing or shortness of breath  o Pain or pressure in the chest that doesn't go away  o Feeling confused like you haven't felt before, or not being able to wake up  o Bluish-colored lips or face    Where can I get more information?  [facility] - About COVID-19:   [email or web address]    CDC - What to Do If You're Sick:   www.cdc.gov/coronavirus/2019-ncov/about/steps-when-sick.html    Dear Do Hassan,    Your symptoms show that you may have coronavirus (COVID-19). This illness can cause fever, cough and trouble breathing. Many people get a  mild case and get better on their own. Some people can get very sick.    Will I be tested for COVID-19?  We would like to test you for Covid-19 virus. I have placed orders for this test.     For all employees or close contacts (except [employer] and [employer] - see below), go to your [employer] home page and scroll down to the section that says  You have an appointment that needs to be scheduled  and click the large green button that says  Schedule Now  and follow the steps to find the next available opening.     If you are unable to complete these steps or if you cannot find any available times, please call [phone] to schedule employee testing.     [employer] employees or close contacts, please call [phone].   [place] ([employer]) employees or close contacts call [phone].    Return to work/school/ guidance:  Please let your workplace manager and staffing office know when your quarantine ends     We can t give you an exact date as it depends on the above. You can calculate this on your own or work with your manager/staffing office to calculate this. (For example if you were exposed on 10/4, you would have to quarantine for 14 full days. That would be through 10/18. You could return on 10/19.)      If you receive a positive COVID-19 test result, follow the guidance of the those who are giving you the results. Usually the return to work is 10 (or in some cases 20 days from symptom onset.) If you work at [employer], you must also be cleared by Employee Occupational Health and Safety to return to work.        If you receive a negative COVID-19 test result and did not have a high risk exposure to someone with a known positive COVID-19 test, you can return to work once you're free of fever for 24 hours without fever-reducing medication and your symptoms are improving or resolved.      If you receive a negative COVID-19 test and If you had a high risk exposure to someone who has tested positive for  COVID-19 then you can return to work 14 days after your last contact with the positive individual    Note: If you have ongoing exposure to the covid positive person, this quarantine period may be more than 14 days. (For example, if you are continued to be exposed to your child who tested positive and cannot isolate from them, then the quarantine of 7-14 days can't start until your child is no longer contagious. This is typically 10 days from onset of the child's symptoms. So the total duration may be 17-24 days in this case.)    Sign up for [employer].   We know it's scary to hear that you might have COVID-19. We want to track your symptoms to make sure you're okay over the next 2 weeks. Please look for an email from [employer]--this is a free, online program that we'll use to keep in touch. To sign up, follow the link in the email you will receive. Learn more at [email or web address]    How can I take care of myself?    Get lots of rest. Drink extra fluids (unless a doctor has told you not to)    Take Tylenol (acetaminophen) or ibuprofen for fever or pain. If you have liver or kidney problems, ask your family doctor if it's okay to take Tylenol o ibuprofen    If you have other health problems (like cancer, heart failure, an organ transplant or severe kidney disease): Call your specialty clinic if you don't feel better in the next 2 days.    Know when to call 911. Emergency warning signs include:  o Trouble breathing or shortness of breath  o Pain or pressure in the chest that doesn't go away  o Feeling confused like you haven't felt before, or not being able to wake up  o Bluish-colored lips or face    Where can I get more information?   [facility] - About COVID-19:   [email or web address]    CDC - What to Do If You're Sick:   www.cdc.gov/coronavirus/2019-ncov/about/steps-when-sick.html

## 2020-12-31 NOTE — TELEPHONE ENCOUNTER
Patient reports cold symptoms for 2-3 days; has had bronchitis before, some wheezing at night but no shortness of breath or fever.  Patient reports some tachycardia but says that happens when she gets sick.      Patient asked if her symptoms could be COVID-19.  FNA advised that it could be, she would have to get tested to know for sure.    Reviewed HOME care advice with caller per RN triage protocol guideline.  Caller verbalized understanding and agrees with plan.        Additional Information    Negative: Severe difficulty breathing (e.g., struggling for each breath, speaks in single words)    Negative: Sounds like a life-threatening emergency to the triager    Negative: Fever > 104 F (40 C)    Negative: [1] Difficulty breathing AND [2] not severe AND [3] not from stuffy nose (e.g., not relieved by cleaning out the nose)    Negative: Patient sounds very sick or weak to the triager    Negative: [1] Fever > 101 F (38.3 C) AND [2] age > 60    Negative: [1] Fever > 100.0 F (37.8 C) AND [2] bedridden (e.g., nursing home patient, CVA, chronic illness, recovering from surgery)    Negative: [1] Fever > 100.0 F (37.8 C) AND [2] diabetes mellitus or weak immune system (e.g., HIV positive, cancer chemo, splenectomy, organ transplant, chronic steroids)    Negative: Fever present > 3 days (72 hours)    Negative: [1] Fever returns after gone for over 24 hours AND [2] symptoms worse or not improved    Negative: [1] Sinus pain (not just congestion) AND [2] fever    Negative: Earache    Negative: [1] SEVERE sore throat AND [2] present > 24 hours    Negative: [1] Sinus congestion (pressure, fullness) AND [2] present > 10 days    Negative: [1] Nasal discharge AND [2] present > 10 days    Negative: [1] Using nasal washes and pain medicine > 24 hours AND [2] sinus pain (lower forehead, cheekbone, or eye) persists    Negative: Sores with yellow scabs around the nasal opening    Cold with no complications    Protocols used: COMMON  COLD-A-AH

## 2021-01-04 ENCOUNTER — HEALTH MAINTENANCE LETTER (OUTPATIENT)
Age: 31
End: 2021-01-04

## 2021-01-13 ENCOUNTER — OFFICE VISIT (OUTPATIENT)
Dept: FAMILY MEDICINE | Facility: CLINIC | Age: 31
End: 2021-01-13
Payer: COMMERCIAL

## 2021-01-13 VITALS
DIASTOLIC BLOOD PRESSURE: 72 MMHG | TEMPERATURE: 97.9 F | HEIGHT: 64 IN | BODY MASS INDEX: 25.1 KG/M2 | HEART RATE: 78 BPM | OXYGEN SATURATION: 97 % | SYSTOLIC BLOOD PRESSURE: 104 MMHG | WEIGHT: 147 LBS

## 2021-01-13 DIAGNOSIS — N89.8 VAGINAL DISCHARGE: ICD-10-CM

## 2021-01-13 DIAGNOSIS — N92.6 IRREGULAR MENSTRUAL CYCLE: ICD-10-CM

## 2021-01-13 DIAGNOSIS — N83.209 CYST OF OVARY, UNSPECIFIED LATERALITY: ICD-10-CM

## 2021-01-13 DIAGNOSIS — Z00.00 ROUTINE GENERAL MEDICAL EXAMINATION AT A HEALTH CARE FACILITY: Primary | ICD-10-CM

## 2021-01-13 LAB
HCG UR QL: NEGATIVE
SPECIMEN SOURCE: NORMAL
WET PREP SPEC: NORMAL

## 2021-01-13 PROCEDURE — 84443 ASSAY THYROID STIM HORMONE: CPT | Performed by: NURSE PRACTITIONER

## 2021-01-13 PROCEDURE — 87210 SMEAR WET MOUNT SALINE/INK: CPT | Performed by: NURSE PRACTITIONER

## 2021-01-13 PROCEDURE — 81025 URINE PREGNANCY TEST: CPT | Performed by: NURSE PRACTITIONER

## 2021-01-13 PROCEDURE — 36415 COLL VENOUS BLD VENIPUNCTURE: CPT | Performed by: NURSE PRACTITIONER

## 2021-01-13 PROCEDURE — 99385 PREV VISIT NEW AGE 18-39: CPT | Performed by: NURSE PRACTITIONER

## 2021-01-13 RX ORDER — FAMOTIDINE 40 MG/1
40 TABLET, FILM COATED ORAL DAILY
COMMUNITY
End: 2022-05-02

## 2021-01-13 RX ORDER — SODIUM CHLORIDE 1 G/1
1 TABLET ORAL DAILY
COMMUNITY
Start: 2020-08-04 | End: 2023-05-18

## 2021-01-13 ASSESSMENT — MIFFLIN-ST. JEOR: SCORE: 1372.04
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
## 2021-01-14 LAB — TSH SERPL DL<=0.005 MIU/L-ACNC: 0.8 MU/L (ref 0.4–4)

## 2021-02-10 ENCOUNTER — OFFICE VISIT (OUTPATIENT)
Dept: MIDWIFE SERVICES | Facility: CLINIC | Age: 31
End: 2021-02-10
Payer: COMMERCIAL

## 2021-02-10 VITALS
SYSTOLIC BLOOD PRESSURE: 114 MMHG | HEART RATE: 81 BPM | DIASTOLIC BLOOD PRESSURE: 72 MMHG | TEMPERATURE: 97.9 F | BODY MASS INDEX: 24.65 KG/M2 | WEIGHT: 144.4 LBS | HEIGHT: 64 IN

## 2021-02-10 DIAGNOSIS — N94.10 DYSPAREUNIA, FEMALE: ICD-10-CM

## 2021-02-10 DIAGNOSIS — Z97.5 IUD (INTRAUTERINE DEVICE) IN PLACE: ICD-10-CM

## 2021-02-10 DIAGNOSIS — R10.2 PELVIC PAIN: Primary | ICD-10-CM

## 2021-02-10 PROCEDURE — 99213 OFFICE O/P EST LOW 20 MIN: CPT | Performed by: ADVANCED PRACTICE MIDWIFE

## 2021-02-10 ASSESSMENT — MIFFLIN-ST. JEOR: SCORE: 1359.99

## 2021-02-10 NOTE — PROGRESS NOTES
"S:  Do is here for assessment of IUD. She felt IUD strings very low in her vagina this week, accompanied by pelvic pressure and pain. In general she is feeling increased pelvic pain, along with inguinal hernia pain and pain with sex. Do wonders if she is developing new endometriomas or cysts. Denies changes in vaginal discharge, risks of STI, dysuria.     Do has had pelvic pain in the past and has found some relief with pelvic floor therapy. Her pain is also caused by endometriosis. She has had surgery for endometriosis in 2017, but continued to experience pelvic pain and painful periods. To manage endometriosis, she tried taking COCPs, but developed migraine headaches and discontinued. In addition to endometriosis, she has a history of ovarian cysts. In 8/2020, she presented to the ED with pelvic pain during period, and US showed L corpus luteal cyst. Kyleena IUD was placed in 9/2020 to manage pain with periods.     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     STI testing offered today and Do declines, though she understands that infection could contribute to pelvic pain.    O:  /72 (BP Location: Left arm, Patient Position: Sitting, Cuff Size: Adult Regular)   Pulse 81   Temp 97.9  F (36.6  C) (Temporal)   Ht 1.626 m (5' 4\")   Wt 65.5 kg (144 lb 6.4 oz)   Breastfeeding No   BMI 24.79 kg/m      Review of prior notes and images: Note from ED in 8/2020 with pelvic pain and nausea. Review of US from 8/2020 showed L corpus luteal cyst    Pelvic Exam:  Vulva: No external lesions, normal hair distribution, no adenopathy  Vagina: Moist, pink, no abnormal discharge, well rugated, no lesions  Cervix: parous, smooth, pink, no visible lesions, IUD strings visible, 2cm long, negative CMT  Uterus: Normal size, anteverted, non-tender, mobile    Abdomen: soft, mild tenderness in all quadrants, negative rebound, negative guarding    A/P:  (Z97.5) IUD (intrauterine device) " in place  (primary encounter diagnosis)    (R10.2) Pelvic pain  Plan: US Transvaginal Non OB        Encouraged to schedule follow-up appointment with OBGYN from our team after US to further assess pelvic pain.    (N94.10) Dyspareunia, female      Andria Hickey, VERENA RIOSM

## 2021-03-05 ENCOUNTER — APPOINTMENT (OUTPATIENT)
Dept: ULTRASOUND IMAGING | Facility: CLINIC | Age: 31
End: 2021-03-05
Attending: EMERGENCY MEDICINE
Payer: COMMERCIAL

## 2021-03-05 ENCOUNTER — HOSPITAL ENCOUNTER (EMERGENCY)
Facility: CLINIC | Age: 31
Discharge: HOME | End: 2021-03-05
Attending: EMERGENCY MEDICINE | Admitting: EMERGENCY MEDICINE
Payer: COMMERCIAL

## 2021-03-05 VITALS
OXYGEN SATURATION: 100 % | DIASTOLIC BLOOD PRESSURE: 60 MMHG | TEMPERATURE: 99.1 F | RESPIRATION RATE: 16 BRPM | SYSTOLIC BLOOD PRESSURE: 111 MMHG | HEART RATE: 71 BPM

## 2021-03-05 DIAGNOSIS — R10.31 ABDOMINAL PAIN, RIGHT LOWER QUADRANT: ICD-10-CM

## 2021-03-05 LAB
ALBUMIN SERPL-MCNC: 4.1 G/DL (ref 3.4–5)
ALBUMIN UR-MCNC: NEGATIVE MG/DL
ALP SERPL-CCNC: 61 U/L (ref 40–150)
ALT SERPL W P-5'-P-CCNC: 16 U/L (ref 0–50)
ANION GAP SERPL CALCULATED.3IONS-SCNC: 3 MMOL/L (ref 3–14)
APPEARANCE UR: CLEAR
AST SERPL W P-5'-P-CCNC: 7 U/L (ref 0–45)
BASOPHILS # BLD AUTO: 0.1 10E9/L (ref 0–0.2)
BASOPHILS NFR BLD AUTO: 0.6 %
BILIRUB SERPL-MCNC: 0.3 MG/DL (ref 0.2–1.3)
BILIRUB UR QL STRIP: NEGATIVE
BUN SERPL-MCNC: 11 MG/DL (ref 7–30)
CALCIUM SERPL-MCNC: 9.2 MG/DL (ref 8.5–10.1)
CHLORIDE SERPL-SCNC: 105 MMOL/L (ref 94–109)
CO2 SERPL-SCNC: 29 MMOL/L (ref 20–32)
COLOR UR AUTO: YELLOW
CREAT SERPL-MCNC: 0.74 MG/DL (ref 0.52–1.04)
DIFFERENTIAL METHOD BLD: NORMAL
EOSINOPHIL # BLD AUTO: 0.4 10E9/L (ref 0–0.7)
EOSINOPHIL NFR BLD AUTO: 4.6 %
ERYTHROCYTE [DISTWIDTH] IN BLOOD BY AUTOMATED COUNT: 12.1 % (ref 10–15)
GFR SERPL CREATININE-BSD FRML MDRD: >90 ML/MIN/{1.73_M2}
GLUCOSE SERPL-MCNC: 93 MG/DL (ref 70–99)
GLUCOSE UR STRIP-MCNC: NEGATIVE MG/DL
HCG UR QL: NEGATIVE
HCT VFR BLD AUTO: 39.6 % (ref 35–47)
HGB BLD-MCNC: 13.5 G/DL (ref 11.7–15.7)
HGB UR QL STRIP: NEGATIVE
IMM GRANULOCYTES # BLD: 0 10E9/L (ref 0–0.4)
IMM GRANULOCYTES NFR BLD: 0.3 %
KETONES UR STRIP-MCNC: NEGATIVE MG/DL
LEUKOCYTE ESTERASE UR QL STRIP: NEGATIVE
LIPASE SERPL-CCNC: 132 U/L (ref 73–393)
LYMPHOCYTES # BLD AUTO: 3.1 10E9/L (ref 0.8–5.3)
LYMPHOCYTES NFR BLD AUTO: 32.9 %
MCH RBC QN AUTO: 30 PG (ref 26.5–33)
MCHC RBC AUTO-ENTMCNC: 34.1 G/DL (ref 31.5–36.5)
MCV RBC AUTO: 88 FL (ref 78–100)
MONOCYTES # BLD AUTO: 0.5 10E9/L (ref 0–1.3)
MONOCYTES NFR BLD AUTO: 5 %
NEUTROPHILS # BLD AUTO: 5.3 10E9/L (ref 1.6–8.3)
NEUTROPHILS NFR BLD AUTO: 56.6 %
NITRATE UR QL: NEGATIVE
NRBC # BLD AUTO: 0 10*3/UL
NRBC BLD AUTO-RTO: 0 /100
PH UR STRIP: 6.5 PH (ref 5–7)
PLATELET # BLD AUTO: 331 10E9/L (ref 150–450)
POTASSIUM SERPL-SCNC: 3.8 MMOL/L (ref 3.4–5.3)
PROT SERPL-MCNC: 7.8 G/DL (ref 6.8–8.8)
RBC # BLD AUTO: 4.5 10E12/L (ref 3.8–5.2)
SODIUM SERPL-SCNC: 137 MMOL/L (ref 133–144)
SOURCE: NORMAL
SP GR UR STRIP: 1.02 (ref 1–1.03)
UROBILINOGEN UR STRIP-MCNC: NORMAL MG/DL (ref 0–2)
WBC # BLD AUTO: 9.4 10E9/L (ref 4–11)

## 2021-03-05 PROCEDURE — 96361 HYDRATE IV INFUSION ADD-ON: CPT

## 2021-03-05 PROCEDURE — 80053 COMPREHEN METABOLIC PANEL: CPT | Performed by: EMERGENCY MEDICINE

## 2021-03-05 PROCEDURE — 83690 ASSAY OF LIPASE: CPT | Performed by: EMERGENCY MEDICINE

## 2021-03-05 PROCEDURE — 99284 EMERGENCY DEPT VISIT MOD MDM: CPT | Mod: 25

## 2021-03-05 PROCEDURE — 81025 URINE PREGNANCY TEST: CPT | Performed by: EMERGENCY MEDICINE

## 2021-03-05 PROCEDURE — 81003 URINALYSIS AUTO W/O SCOPE: CPT | Performed by: EMERGENCY MEDICINE

## 2021-03-05 PROCEDURE — 96374 THER/PROPH/DIAG INJ IV PUSH: CPT

## 2021-03-05 PROCEDURE — 76830 TRANSVAGINAL US NON-OB: CPT

## 2021-03-05 PROCEDURE — 258N000003 HC RX IP 258 OP 636: Performed by: EMERGENCY MEDICINE

## 2021-03-05 PROCEDURE — 99284 EMERGENCY DEPT VISIT MOD MDM: CPT | Performed by: EMERGENCY MEDICINE

## 2021-03-05 PROCEDURE — 85025 COMPLETE CBC W/AUTO DIFF WBC: CPT | Performed by: EMERGENCY MEDICINE

## 2021-03-05 PROCEDURE — 96375 TX/PRO/DX INJ NEW DRUG ADDON: CPT

## 2021-03-05 PROCEDURE — 250N000011 HC RX IP 250 OP 636: Performed by: EMERGENCY MEDICINE

## 2021-03-05 RX ORDER — ONDANSETRON 2 MG/ML
4 INJECTION INTRAMUSCULAR; INTRAVENOUS EVERY 30 MIN PRN
Status: DISCONTINUED | OUTPATIENT
Start: 2021-03-05 | End: 2021-03-05 | Stop reason: HOSPADM

## 2021-03-05 RX ORDER — MORPHINE SULFATE 4 MG/ML
4 INJECTION, SOLUTION INTRAMUSCULAR; INTRAVENOUS
Status: DISCONTINUED | OUTPATIENT
Start: 2021-03-05 | End: 2021-03-05 | Stop reason: HOSPADM

## 2021-03-05 RX ORDER — KETOROLAC TROMETHAMINE 30 MG/ML
30 INJECTION, SOLUTION INTRAMUSCULAR; INTRAVENOUS ONCE
Status: COMPLETED | OUTPATIENT
Start: 2021-03-05 | End: 2021-03-05

## 2021-03-05 RX ORDER — SODIUM CHLORIDE 9 MG/ML
INJECTION, SOLUTION INTRAVENOUS CONTINUOUS
Status: DISCONTINUED | OUTPATIENT
Start: 2021-03-05 | End: 2021-03-05 | Stop reason: HOSPADM

## 2021-03-05 RX ADMIN — ONDANSETRON 4 MG: 2 INJECTION INTRAMUSCULAR; INTRAVENOUS at 16:41

## 2021-03-05 RX ADMIN — SODIUM CHLORIDE 1000 ML: 9 INJECTION, SOLUTION INTRAVENOUS at 16:40

## 2021-03-05 RX ADMIN — KETOROLAC TROMETHAMINE 30 MG: 30 INJECTION, SOLUTION INTRAMUSCULAR; INTRAVENOUS at 16:41

## 2021-03-05 ASSESSMENT — ENCOUNTER SYMPTOMS
HEADACHES: 0
COLOR CHANGE: 0
SHORTNESS OF BREATH: 0
FEVER: 0
NECK STIFFNESS: 0
EYE REDNESS: 0
CONFUSION: 0
ABDOMINAL PAIN: 1
DIFFICULTY URINATING: 0
ARTHRALGIAS: 0
COUGH: 0

## 2021-03-05 NOTE — ED TRIAGE NOTES
Pt states she has hx of ovarian cysts and chronic pain. Pt says intense pain in RLQ started 1 hour ago and been intermittent since then.

## 2021-03-05 NOTE — ED PROVIDER NOTES
ED Provider Note  [facility]      History     Chief Complaint   Patient presents with     Abdominal Pain     Intense pain in RLQ (pt had appendectomy in 1996).     The history is provided by the patient and medical records.     Do Hassan is a 30 year old female with PMH notable for Óscar-Danlos syndrome, endometriosis s/p excision 2017, ovarian cysts s/p left cystectomy 2020, s/p appendectomy 1996, and s/p L5-S1 spinal fusion 2008 presenting for abdominal pain. Patient reports stabbing RLQ abdominal pain. She states that she has a known hernia there that has never required surgical repair, but this is the worst the pain has ever been. She states that the pain began around 1 pm and was an 8/10 in intensity. She states that the pain was so intense that she was screaming and unable to move. Movement makes the pain worse. The pain has been intermittent since. She states that the last time she had pain like this was when she had an ovarian cyst. Her LMP was 2/21-2/24 which she notes is shorter than her normal periods. She has an IUD in place. She denies fever, cough, shortness of breath.    Past Medical History  Past Medical History:   Diagnosis Date     Anxiety      Arthritis      Depression      Óscar-Danlos syndrome      Endometriosis      Endometriosis 02/2017    evasion of endometriosis     Heartburn      Intussusception (H) 1996     Osteoporosis      POTS (postural orthostatic tachycardia syndrome)      Spina bifida occulta      Substance abuse (H)      Past Surgical History:   Procedure Laterality Date     anterior posterior spinal fusion  2008    L5-S1     APPENDECTOMY       CYSTECTOMY OVARIAN BENIGN  2020    left     DAVINCI ASSISTED ABLATION / EXCISION OF ENDOMETRIOSIS  2017     ORTHOPEDIC SURGERY       famotidine (PEPCID) 40 MG tablet  FLUoxetine (PROZAC) 40 MG capsule  ondansetron (ZOFRAN-ODT) 8 MG ODT tab  propranolol (INDERAL) 10 MG tablet  sodium chloride 1 GM  tablet  UNABLE TO FIND      Allergies   Allergen Reactions     Iodine Anaphylaxis     Contrast Dye Nausea     Family History  Family History   Problem Relation Age of Onset     Arthritis Mother      Thyroid Cancer Maternal Grandmother      Cancer Maternal Grandfather      Osteoporosis Maternal Grandfather      Cerebrovascular Disease Paternal Grandfather      Social History   Social History     Tobacco Use     Smoking status: Former Smoker     Smokeless tobacco: Former User     Quit date: 1/1/2012   Substance Use Topics     Alcohol use: Not Currently     Comment: sober 11 years     Drug use: Never      Past medical history, past surgical history, medications, allergies, family history, and social history were reviewed with the patient. No additional pertinent items.       Review of Systems   Constitutional: Negative for fever.   HENT: Negative for congestion.    Eyes: Negative for redness.   Respiratory: Negative for cough and shortness of breath.    Cardiovascular: Negative for chest pain.   Gastrointestinal: Positive for abdominal pain.   Genitourinary: Negative for difficulty urinating.   Musculoskeletal: Negative for arthralgias and neck stiffness.   Skin: Negative for color change.   Neurological: Negative for headaches.   Psychiatric/Behavioral: Negative for confusion.   All other systems reviewed and are negative.    A complete review of systems was performed with pertinent positives and negatives noted in the HPI, and all other systems negative.    Physical Exam   BP: 104/41  Pulse: 108  Temp: 99.1  F (37.3  C)  Resp: 16  SpO2: 94 %  Physical Exam  Constitutional:       General: She is not in acute distress.     Appearance: She is not diaphoretic.   HENT:      Head: Normocephalic.      Mouth/Throat:      Pharynx: No oropharyngeal exudate.   Eyes:      Extraocular Movements: Extraocular movements intact.   Neck:      Musculoskeletal: Neck supple.   Cardiovascular:      Rate and Rhythm: Normal rate and regular  rhythm.      Heart sounds: Normal heart sounds.   Pulmonary:      Effort: No respiratory distress.      Breath sounds: Normal breath sounds.   Abdominal:      General: There is no distension.      Palpations: Abdomen is soft.      Tenderness: There is abdominal tenderness.      Comments: Moderate right mid and right lower quadrant tenderness   Musculoskeletal:         General: No deformity.   Skin:     General: Skin is warm and dry.   Neurological:      Mental Status: She is alert.      Comments: alert   Psychiatric:         Behavior: Behavior normal.         ED Course      Procedures  Results for orders placed or performed during the hospital encounter of 03/05/21   CBC with platelets differential     Status: None   Result Value Ref Range    WBC 9.4 4.0 - 11.0 10e9/L    RBC Count 4.50 3.8 - 5.2 10e12/L    Hemoglobin 13.5 11.7 - 15.7 g/dL    Hematocrit 39.6 35.0 - 47.0 %    MCV 88 78 - 100 fl    MCH 30.0 26.5 - 33.0 pg    MCHC 34.1 31.5 - 36.5 g/dL    RDW 12.1 10.0 - 15.0 %    Platelet Count 331 150 - 450 10e9/L    Diff Method Automated Method     % Neutrophils 56.6 %    % Lymphocytes 32.9 %    % Monocytes 5.0 %    % Eosinophils 4.6 %    % Basophils 0.6 %    % Immature Granulocytes 0.3 %    Nucleated RBCs 0 0 /100    Absolute Neutrophil 5.3 1.6 - 8.3 10e9/L    Absolute Lymphocytes 3.1 0.8 - 5.3 10e9/L    Absolute Monocytes 0.5 0.0 - 1.3 10e9/L    Absolute Eosinophils 0.4 0.0 - 0.7 10e9/L    Absolute Basophils 0.1 0.0 - 0.2 10e9/L    Abs Immature Granulocytes 0.0 0 - 0.4 10e9/L    Absolute Nucleated RBC 0.0    Comprehensive metabolic panel     Status: None   Result Value Ref Range    Sodium 137 133 - 144 mmol/L    Potassium 3.8 3.4 - 5.3 mmol/L    Chloride 105 94 - 109 mmol/L    Carbon Dioxide 29 20 - 32 mmol/L    Anion Gap 3 3 - 14 mmol/L    Glucose 93 70 - 99 mg/dL    Urea Nitrogen 11 7 - 30 mg/dL    Creatinine 0.74 0.52 - 1.04 mg/dL    GFR Estimate >90 >60 mL/min/[1.73_m2]    GFR Estimate If Black >90 >60  mL/min/[1.73_m2]    Calcium 9.2 8.5 - 10.1 mg/dL    Bilirubin Total 0.3 0.2 - 1.3 mg/dL    Albumin 4.1 3.4 - 5.0 g/dL    Protein Total 7.8 6.8 - 8.8 g/dL    Alkaline Phosphatase 61 40 - 150 U/L    ALT 16 0 - 50 U/L    AST 7 0 - 45 U/L   Lipase     Status: None   Result Value Ref Range    Lipase 132 73 - 393 U/L     Medications   0.9% sodium chloride BOLUS (1,000 mLs Intravenous New Bag 3/5/21 1640)     Followed by   sodium chloride 0.9% infusion (has no administration in time range)   ondansetron (ZOFRAN) injection 4 mg (4 mg Intravenous Given 3/5/21 1641)   ketorolac (TORADOL) injection 30 mg (30 mg Intravenous Given 3/5/21 1641)     Results for orders placed or performed during the hospital encounter of 03/05/21   CBC with platelets differential     Status: None   Result Value Ref Range    WBC 9.4 4.0 - 11.0 10e9/L    RBC Count 4.50 3.8 - 5.2 10e12/L    Hemoglobin 13.5 11.7 - 15.7 g/dL    Hematocrit 39.6 35.0 - 47.0 %    MCV 88 78 - 100 fl    MCH 30.0 26.5 - 33.0 pg    MCHC 34.1 31.5 - 36.5 g/dL    RDW 12.1 10.0 - 15.0 %    Platelet Count 331 150 - 450 10e9/L    Diff Method Automated Method     % Neutrophils 56.6 %    % Lymphocytes 32.9 %    % Monocytes 5.0 %    % Eosinophils 4.6 %    % Basophils 0.6 %    % Immature Granulocytes 0.3 %    Nucleated RBCs 0 0 /100    Absolute Neutrophil 5.3 1.6 - 8.3 10e9/L    Absolute Lymphocytes 3.1 0.8 - 5.3 10e9/L    Absolute Monocytes 0.5 0.0 - 1.3 10e9/L    Absolute Eosinophils 0.4 0.0 - 0.7 10e9/L    Absolute Basophils 0.1 0.0 - 0.2 10e9/L    Abs Immature Granulocytes 0.0 0 - 0.4 10e9/L    Absolute Nucleated RBC 0.0    Comprehensive metabolic panel     Status: None   Result Value Ref Range    Sodium 137 133 - 144 mmol/L    Potassium 3.8 3.4 - 5.3 mmol/L    Chloride 105 94 - 109 mmol/L    Carbon Dioxide 29 20 - 32 mmol/L    Anion Gap 3 3 - 14 mmol/L    Glucose 93 70 - 99 mg/dL    Urea Nitrogen 11 7 - 30 mg/dL    Creatinine 0.74 0.52 - 1.04 mg/dL    GFR Estimate >90 >60  mL/min/[1.73_m2]    GFR Estimate If Black >90 >60 mL/min/[1.73_m2]    Calcium 9.2 8.5 - 10.1 mg/dL    Bilirubin Total 0.3 0.2 - 1.3 mg/dL    Albumin 4.1 3.4 - 5.0 g/dL    Protein Total 7.8 6.8 - 8.8 g/dL    Alkaline Phosphatase 61 40 - 150 U/L    ALT 16 0 - 50 U/L    AST 7 0 - 45 U/L   Lipase     Status: None   Result Value Ref Range    Lipase 132 73 - 393 U/L            Assessments & Plan (with Medical Decision Making)   30-year-old female presents to us with a chief complaint of abdominal pain.  She notes this pain is similar to her previous ovarian cysts.  Differential includes but not limited to UTI, ovarian cyst, ovarian torsion, viral illness, abdominal pain.  Labs are unremarkable.  UA and ultrasound are pending at this time.  Patient care will be signed out to oncoming provider    I have reviewed the nursing notes. I have reviewed the findings, diagnosis, plan and need for follow up with the patient.    New Prescriptions    No medications on file       Final diagnoses:   Abdominal pain, right lower quadrant   I, Ade Méndez, am serving as a trained medical scribe to document services personally performed by Zak Rodriguez DO, based on the provider's statements to me.     I, Zak Rodriguez DO, was physically present and have reviewed and verified the accuracy of this note documented by Ade Méndez.      --  Zak Rodriguez DO  [facility] EMERGENCY DEPARTMENT  3/5/2021     Zak Rodriguez DO  03/05/21 1741

## 2021-03-06 NOTE — DISCHARGE INSTRUCTIONS
Please make an appointment to follow up with Surgery - General [facility](phone: [phone]) as soon as possible.  Results for orders placed or performed during the hospital encounter of 03/05/21   US Pelvis Cmplt w Transvag & Doppler LmtPel Duplex Limited     Status: None (Preliminary result)    Narrative    ULTRASOUND PELVIS COMPLETE WITH TRANSVAGINAL AND DOPPLER LIMITED     3/5/2021 6:29 PM     HISTORY: Right lower quadrant pain.    TECHNIQUE: Transvaginal imaging was added to the transabdominal scans.  Spectral Doppler and wave form analysis was also performed to evaluate  blood flow to the ovaries.    COMPARISON: None.    FINDINGS: The uterus is measured at 7.7 x 4.4 x 5.3 cm. No fibroids  are identified. IUD appears to be in good position within the  endometrium. Endometrial stripe was not visualized, likely due to the  presence of the IUD. The right ovary is unremarkable. The left ovary  is unremarkable. No solid adnexal masses are identified. No free  pelvic fluid is present. Spectral Doppler and waveform analysis  demonstrate arterial and venous flow to both ovaries.       Impression    IMPRESSION:   1. IUD appears to be in good position within the endometrium.  2. Otherwise unremarkable pelvic ultrasound with Doppler. No  convincing sonographic evidence for ovarian torsion.   CBC with platelets differential     Status: None   Result Value Ref Range    WBC 9.4 4.0 - 11.0 10e9/L    RBC Count 4.50 3.8 - 5.2 10e12/L    Hemoglobin 13.5 11.7 - 15.7 g/dL    Hematocrit 39.6 35.0 - 47.0 %    MCV 88 78 - 100 fl    MCH 30.0 26.5 - 33.0 pg    MCHC 34.1 31.5 - 36.5 g/dL    RDW 12.1 10.0 - 15.0 %    Platelet Count 331 150 - 450 10e9/L    Diff Method Automated Method     % Neutrophils 56.6 %    % Lymphocytes 32.9 %    % Monocytes 5.0 %    % Eosinophils 4.6 %    % Basophils 0.6 %    % Immature Granulocytes 0.3 %    Nucleated RBCs 0 0 /100    Absolute Neutrophil 5.3 1.6 - 8.3 10e9/L    Absolute Lymphocytes 3.1 0.8 - 5.3  10e9/L    Absolute Monocytes 0.5 0.0 - 1.3 10e9/L    Absolute Eosinophils 0.4 0.0 - 0.7 10e9/L    Absolute Basophils 0.1 0.0 - 0.2 10e9/L    Abs Immature Granulocytes 0.0 0 - 0.4 10e9/L    Absolute Nucleated RBC 0.0    Comprehensive metabolic panel     Status: None   Result Value Ref Range    Sodium 137 133 - 144 mmol/L    Potassium 3.8 3.4 - 5.3 mmol/L    Chloride 105 94 - 109 mmol/L    Carbon Dioxide 29 20 - 32 mmol/L    Anion Gap 3 3 - 14 mmol/L    Glucose 93 70 - 99 mg/dL    Urea Nitrogen 11 7 - 30 mg/dL    Creatinine 0.74 0.52 - 1.04 mg/dL    GFR Estimate >90 >60 mL/min/[1.73_m2]    GFR Estimate If Black >90 >60 mL/min/[1.73_m2]    Calcium 9.2 8.5 - 10.1 mg/dL    Bilirubin Total 0.3 0.2 - 1.3 mg/dL    Albumin 4.1 3.4 - 5.0 g/dL    Protein Total 7.8 6.8 - 8.8 g/dL    Alkaline Phosphatase 61 40 - 150 U/L    ALT 16 0 - 50 U/L    AST 7 0 - 45 U/L   Lipase     Status: None   Result Value Ref Range    Lipase 132 73 - 393 U/L   UA reflex to Microscopic and Culture     Status: None    Specimen: Urine clean catch; Midstream Urine   Result Value Ref Range    Color Urine Yellow     Appearance Urine Clear     Glucose Urine Negative NEG^Negative mg/dL    Bilirubin Urine Negative NEG^Negative    Ketones Urine Negative NEG^Negative mg/dL    Specific Gravity Urine 1.018 1.003 - 1.035    Blood Urine Negative NEG^Negative    pH Urine 6.5 5.0 - 7.0 pH    Protein Albumin Urine Negative NEG^Negative mg/dL    Urobilinogen mg/dL Normal 0.0 - 2.0 mg/dL    Nitrite Urine Negative NEG^Negative    Leukocyte Esterase Urine Negative NEG^Negative    Source Midstream Urine    HCG qualitative urine (UPT)     Status: None   Result Value Ref Range    HCG Qual Urine Negative NEG^Negative

## 2021-03-06 NOTE — ED NOTES
Emergency Department Patient Sign-out       Brief HPI:  This is a 30 year old female signed out to me by Dr. Rodriguez .  See initial ED Provider note for details of the presentation.            Significant Events prior to my assuming care: RLQ pain      Exam:   Patient Vitals for the past 24 hrs:   BP Temp Temp src Pulse Resp SpO2   03/05/21 1356 104/41 99.1  F (37.3  C) Oral 108 16 94 %           ED RESULTS:   Results for orders placed or performed during the hospital encounter of 03/05/21 (from the past 24 hour(s))   CBC with platelets differential     Status: None    Collection Time: 03/05/21  4:33 PM   Result Value Ref Range    WBC 9.4 4.0 - 11.0 10e9/L    RBC Count 4.50 3.8 - 5.2 10e12/L    Hemoglobin 13.5 11.7 - 15.7 g/dL    Hematocrit 39.6 35.0 - 47.0 %    MCV 88 78 - 100 fl    MCH 30.0 26.5 - 33.0 pg    MCHC 34.1 31.5 - 36.5 g/dL    RDW 12.1 10.0 - 15.0 %    Platelet Count 331 150 - 450 10e9/L    Diff Method Automated Method     % Neutrophils 56.6 %    % Lymphocytes 32.9 %    % Monocytes 5.0 %    % Eosinophils 4.6 %    % Basophils 0.6 %    % Immature Granulocytes 0.3 %    Nucleated RBCs 0 0 /100    Absolute Neutrophil 5.3 1.6 - 8.3 10e9/L    Absolute Lymphocytes 3.1 0.8 - 5.3 10e9/L    Absolute Monocytes 0.5 0.0 - 1.3 10e9/L    Absolute Eosinophils 0.4 0.0 - 0.7 10e9/L    Absolute Basophils 0.1 0.0 - 0.2 10e9/L    Abs Immature Granulocytes 0.0 0 - 0.4 10e9/L    Absolute Nucleated RBC 0.0    Comprehensive metabolic panel     Status: None    Collection Time: 03/05/21  4:33 PM   Result Value Ref Range    Sodium 137 133 - 144 mmol/L    Potassium 3.8 3.4 - 5.3 mmol/L    Chloride 105 94 - 109 mmol/L    Carbon Dioxide 29 20 - 32 mmol/L    Anion Gap 3 3 - 14 mmol/L    Glucose 93 70 - 99 mg/dL    Urea Nitrogen 11 7 - 30 mg/dL    Creatinine 0.74 0.52 - 1.04 mg/dL    GFR Estimate >90 >60 mL/min/[1.73_m2]    GFR Estimate If Black >90 >60 mL/min/[1.73_m2]    Calcium 9.2 8.5 - 10.1 mg/dL    Bilirubin Total 0.3 0.2 -  1.3 mg/dL    Albumin 4.1 3.4 - 5.0 g/dL    Protein Total 7.8 6.8 - 8.8 g/dL    Alkaline Phosphatase 61 40 - 150 U/L    ALT 16 0 - 50 U/L    AST 7 0 - 45 U/L   Lipase     Status: None    Collection Time: 03/05/21  4:33 PM   Result Value Ref Range    Lipase 132 73 - 393 U/L   UA reflex to Microscopic and Culture     Status: None    Collection Time: 03/05/21  5:44 PM    Specimen: Urine clean catch; Midstream Urine   Result Value Ref Range    Color Urine Yellow     Appearance Urine Clear     Glucose Urine Negative NEG^Negative mg/dL    Bilirubin Urine Negative NEG^Negative    Ketones Urine Negative NEG^Negative mg/dL    Specific Gravity Urine 1.018 1.003 - 1.035    Blood Urine Negative NEG^Negative    pH Urine 6.5 5.0 - 7.0 pH    Protein Albumin Urine Negative NEG^Negative mg/dL    Urobilinogen mg/dL Normal 0.0 - 2.0 mg/dL    Nitrite Urine Negative NEG^Negative    Leukocyte Esterase Urine Negative NEG^Negative    Source Midstream Urine    HCG qualitative urine (UPT)     Status: None    Collection Time: 03/05/21  5:44 PM   Result Value Ref Range    HCG Qual Urine Negative NEG^Negative   US Pelvis Cmplt w Transvag & Doppler LmtPel Duplex Limited     Status: None (Preliminary result)    Collection Time: 03/05/21  6:29 PM    Narrative    ULTRASOUND PELVIS COMPLETE WITH TRANSVAGINAL AND DOPPLER LIMITED     3/5/2021 6:29 PM     HISTORY: Right lower quadrant pain.    TECHNIQUE: Transvaginal imaging was added to the transabdominal scans.  Spectral Doppler and wave form analysis was also performed to evaluate  blood flow to the ovaries.    COMPARISON: None.    FINDINGS: The uterus is measured at 7.7 x 4.4 x 5.3 cm. No fibroids  are identified. IUD appears to be in good position within the  endometrium. Endometrial stripe was not visualized, likely due to the  presence of the IUD. The right ovary is unremarkable. The left ovary  is unremarkable. No solid adnexal masses are identified. No free  pelvic fluid is present. Spectral  Doppler and waveform analysis  demonstrate arterial and venous flow to both ovaries.       Impression    IMPRESSION:   1. IUD appears to be in good position within the endometrium.  2. Otherwise unremarkable pelvic ultrasound with Doppler. No  convincing sonographic evidence for ovarian torsion.       ED MEDICATIONS:   Medications   0.9% sodium chloride BOLUS (1,000 mLs Intravenous New Bag 3/5/21 1640)     Followed by   sodium chloride 0.9% infusion (has no administration in time range)   ondansetron (ZOFRAN) injection 4 mg (4 mg Intravenous Given 3/5/21 1641)   morphine (PF) injection 4 mg (has no administration in time range)   ketorolac (TORADOL) injection 30 mg (30 mg Intravenous Given 3/5/21 1641)         Impression:    ICD-10-CM    1. Abdominal pain, right lower quadrant  R10.31 UA reflex to Microscopic and Culture     HCG qualitative urine (UPT)       Plan:    Pending studies include pelvic ultrasound.    I reviewed the imaging and reassess the patient.  At this time the patient believes that her symptoms are most likely caused from a right inguinal hernia.  On physical exam I do not appreciate a hernia at this time.  I did refer her to the general surgeons office for referral.  I recommendation at this time is to utilize a lidocaine patch for symptom relief.  Pt was discharged home/self-care.  PT was provided written discharge instructions. Additionally verbal instructions were given and discussed with patient.  PT was asked to return to the ED immediately for any new or concerning symptoms.  Pt was in agreement, endorsed understanding, and questions were answered.         MD Darius Scherer, Alo Villalta MD  03/05/21 7016

## 2021-03-07 ENCOUNTER — NURSE TRIAGE (OUTPATIENT)
Dept: NURSING | Facility: CLINIC | Age: 31
End: 2021-03-07

## 2021-03-07 NOTE — TELEPHONE ENCOUNTER
"\"I was in the ER (see epic) on 3/5 for lower right abdominal pain. They did an US, it showed nothing. I have a hx of cysts but it did not show any. I do not have my appendix. I think it might be constipation. Last BM was 4 days ago. Usually I will drink coffee and water and can go.\"  Denies other sx  Rates lower right abdominal pain a 7/10.  Triaged and gave home care advice and things to try at home to induce a bowel movement.  If pain is constant or worsens advised to return to ED, if not follow up with PCP in am 3/8  Call back if needed.  Aleshia Crocker RN [facility]        Additional Information    [1] MODERATE pain (e.g., interferes with normal activities) AND [2] pain comes and goes (cramps) AND [3] present > 24 hours  (Exception: pain with Vomiting or Diarrhea - see that Guideline)    Protocols used: ABDOMINAL PAIN - FEMALE-A-AH      "

## 2021-03-08 ENCOUNTER — MYC MEDICAL ADVICE (OUTPATIENT)
Dept: FAMILY MEDICINE | Facility: CLINIC | Age: 31
End: 2021-03-08

## 2021-03-08 NOTE — TELEPHONE ENCOUNTER
REFERRAL INFORMATION:    Referring Provider:  N/A    Referring Clinic:  N/A    Reason for Visit/Diagnosis: Inguinal hernia, ED follow up        FUTURE VISIT INFORMATION:    Appointment Date: 3/11/2021    Appointment Time: 12:30 PM      NOTES RECORD STATUS  DETAILS   OFFICE NOTE from Referring Provider N/A    OFFICE NOTE from Other Specialists N/A    HOSPITAL DISCHARGE SUMMARY/ ED VISITS  Internal 3/5/2021 ([facility])    OPERATIVE REPORT N/A    ENDOSCOPY (EGD)  N/A    PERTINENT LABS Internal    PATHOLOGY REPORTS (RELATED) N/A    IMAGING (CT, MRI, US, XR)  Internal US Pelvis: 3/5/2021

## 2021-03-09 NOTE — TELEPHONE ENCOUNTER
Call to patient - she says she spoke with triage already and was also able to get connected up with general surgery for home care, when to seek ED care, and appointment

## 2021-03-10 DIAGNOSIS — F33.9 RECURRENT MAJOR DEPRESSIVE DISORDER, REMISSION STATUS UNSPECIFIED (H): Primary | ICD-10-CM

## 2021-03-10 RX ORDER — FLUOXETINE 40 MG/1
40 CAPSULE ORAL DAILY
Qty: 90 CAPSULE | Refills: 3 | Status: SHIPPED | OUTPATIENT
Start: 2021-03-10 | End: 2022-04-20

## 2021-03-10 NOTE — LACTATION INITIAL EVALUATION - INFANT FEEDING PLAN COMMENT, OB PROFILE
Triple Feeding Plan Care includes; offering the breast at each feeding time, limit nursing to 15 minutes each breast or 20-30 minutes total , especially for  babies. Providing supplemental milk in recommended volume according to age;Day 1, 2 -10 cc's, day 2, 5-15 cc's, day 3, 15 -30 cc's, day 4, 30-60cc's, at least 8 times a day (q3h). Pumping after nursing first or an attempt at nursing, to secure milk supply and provide expressed milk as a supplement. LOCKER # 15/Clothing

## 2021-03-11 ENCOUNTER — PRE VISIT (OUTPATIENT)
Dept: SURGERY | Facility: CLINIC | Age: 31
End: 2021-03-11

## 2021-03-11 ENCOUNTER — VIRTUAL VISIT (OUTPATIENT)
Dept: FAMILY MEDICINE | Facility: CLINIC | Age: 31
End: 2021-03-11
Payer: COMMERCIAL

## 2021-03-11 ENCOUNTER — OFFICE VISIT (OUTPATIENT)
Dept: SURGERY | Facility: CLINIC | Age: 31
End: 2021-03-11
Payer: COMMERCIAL

## 2021-03-11 VITALS
HEIGHT: 64 IN | OXYGEN SATURATION: 96 % | BODY MASS INDEX: 24.82 KG/M2 | HEART RATE: 87 BPM | SYSTOLIC BLOOD PRESSURE: 108 MMHG | WEIGHT: 145.4 LBS | DIASTOLIC BLOOD PRESSURE: 69 MMHG

## 2021-03-11 DIAGNOSIS — F33.41 RECURRENT MAJOR DEPRESSIVE DISORDER, IN PARTIAL REMISSION (H): Primary | ICD-10-CM

## 2021-03-11 DIAGNOSIS — R10.31 RIGHT GROIN PAIN: Primary | ICD-10-CM

## 2021-03-11 PROCEDURE — 99203 OFFICE O/P NEW LOW 30 MIN: CPT | Performed by: SURGERY

## 2021-03-11 PROCEDURE — 99213 OFFICE O/P EST LOW 20 MIN: CPT | Mod: 95 | Performed by: NURSE PRACTITIONER

## 2021-03-11 PROCEDURE — 96127 BRIEF EMOTIONAL/BEHAV ASSMT: CPT | Mod: 95 | Performed by: NURSE PRACTITIONER

## 2021-03-11 RX ORDER — ONDANSETRON 4 MG/1
TABLET, FILM COATED ORAL
Qty: 1 TABLET | Refills: 0 | Status: SHIPPED | OUTPATIENT
Start: 2021-03-11 | End: 2021-03-11

## 2021-03-11 ASSESSMENT — PATIENT HEALTH QUESTIONNAIRE - PHQ9
SUM OF ALL RESPONSES TO PHQ QUESTIONS 1-9: 3
5. POOR APPETITE OR OVEREATING: NOT AT ALL

## 2021-03-11 ASSESSMENT — ANXIETY QUESTIONNAIRES
5. BEING SO RESTLESS THAT IT IS HARD TO SIT STILL: NOT AT ALL
6. BECOMING EASILY ANNOYED OR IRRITABLE: NOT AT ALL
2. NOT BEING ABLE TO STOP OR CONTROL WORRYING: NOT AT ALL
1. FEELING NERVOUS, ANXIOUS, OR ON EDGE: NOT AT ALL
7. FEELING AFRAID AS IF SOMETHING AWFUL MIGHT HAPPEN: NOT AT ALL
GAD7 TOTAL SCORE: 0
IF YOU CHECKED OFF ANY PROBLEMS ON THIS QUESTIONNAIRE, HOW DIFFICULT HAVE THESE PROBLEMS MADE IT FOR YOU TO DO YOUR WORK, TAKE CARE OF THINGS AT HOME, OR GET ALONG WITH OTHER PEOPLE: NOT DIFFICULT AT ALL
3. WORRYING TOO MUCH ABOUT DIFFERENT THINGS: NOT AT ALL

## 2021-03-11 ASSESSMENT — MIFFLIN-ST. JEOR: SCORE: 1364.53

## 2021-03-11 ASSESSMENT — PAIN SCALES - GENERAL: PAINLEVEL: MODERATE PAIN (5)

## 2021-03-11 NOTE — LETTER
3/11/2021       RE: Do Hassan  [address]  [place] [facility] [address]     Dear Colleague,    Thank you for referring your patient, Do Hassan, to the [facility] [place] at [facility]. Please see a copy of my visit note below.    Do Hassan is a 30 year old female with a 10 year history of chronic pain at the right lower quad af abdomen. Was worked up in [place] without significant result.   Onset did not occur with lifting.  Obstructive symptoms:  no  Urinary difficulties:  no  Chronic cough: no  Constipation:  no  Current level of activity:  Low    Past medical and surgical history, medications, allergies, family history, and social history were reviewed with the patient. Has several medical and surgical issues.  Past Medical History:   Diagnosis Date     Anxiety      Arthritis      Depression      Óscar-Danlos syndrome      Endometriosis      Endometriosis 02/2017    evasion of endometriosis     Heartburn      Intussusception (H) 1996     Osteoporosis      POTS (postural orthostatic tachycardia syndrome)      Spina bifida occulta      Substance abuse (H)      Past Surgical History:   Procedure Laterality Date     anterior posterior spinal fusion  2008    L5-S1     APPENDECTOMY       CYSTECTOMY OVARIAN BENIGN  2020    left     DAVINCI ASSISTED ABLATION / EXCISION OF ENDOMETRIOSIS  2017     ORTHOPEDIC SURGERY         ROS: 10 point review of systems negative except noted in HPI  PHYSICAL EXAM  General appearance- healthy, alert, and in no distress.  Lungs- Respiratory effort unlabored.  Gait- Normal.  Abdomen - soft non distended, non tender without obvious masses.  Right inguinal region without obvious hernia.  Impression: Chronic right lower abdominal pain with question of RIH by report.  Understands cannot guarantee that open RIH would alleviate chronic pain.  Will need CT to confirm presence of RIH. If yes can  consider open RIH under MAC.   I will call with Ct results and plan.    Again, thank you for allowing me to participate in the care of your patient.      Sincerely,    Abhijit Morales MD

## 2021-03-11 NOTE — NURSING NOTE
"Chief Complaint   Patient presents with     Consult     New hernia surgery.       Vitals:    03/11/21 1226   BP: 108/69   BP Location: Left arm   Patient Position: Sitting   Cuff Size: Adult Regular   Pulse: 87   SpO2: 96%   Weight: 66 kg (145 lb 6.4 oz)   Height: 1.626 m (5' 4\")       Body mass index is 24.96 kg/m .                            MAY WELCH EMT    "

## 2021-03-11 NOTE — NURSING NOTE
Pre and Post op Patient Education/Teaching Flowsheet  Relevant Diagnosis:  Hernia  Teaching Topic:  Pre and post op teaching  Person(s) Involved in teaching:  Patient     Motivation Level:  Asks Questions:  Yes  Eager to Learn:  Yes  Cooperative:  Yes  Receptive (willing/able to accept information):  Yes  Any cultural factors/B[employer] beliefs that may influence understanding or compliance?  No    Patient/caregiver/family demonstrates understanding of the following:  Reason for the appointment, diagnosis, and treatment plan:  Yes  Patient demonstrates understanding of the following:  Pre-op bowel prep:  No  Post-op pain management recommendations (medications, ice compress, binder/athletic supporter (if applicable), etc.:  Yes  Inguinal hernia patients:  Post-op urinary retention- discussed signs/symptoms and visit to ER for [place] catheter placement and to stay in place for at least 48 hours:  NA  Restrictions:  Yes  Medications to take the day of surgery:  Per PCP  Blood thinner medications discussed and when to stop (if applicable):  Yes  Wound care:  Yes  Diabetes medication management (if applicable):  Per PCP  Which situations necessitate calling provider and whom to contact:  Discussed how to contact the hospital, nurse, and clinic scheduling staff if necessary      Date and time of surgery:  TBD  Location of surgery: [facility] 5th Floor  History and Physical and any other testing necessary prior to surgery:  Yes  Required time line for completion of History and Physical and any pre-op testing:  Yes  Discuss need for someone to drive patient home and stay with them for 24 hours:  Yes  Pre-op showering/scrub information with Surgical Scrub:  Yes  NPO Guidelines:  NPO per Anesthesia Guidelines  COVID-19 Testing:  Yes    Infection Prevention: Patient demonstrates understanding of the following:  Patient instructed on hand hygiene:  Yes  Surgical procedure site care will be  taught and will be reviewed at the time of discharge  Signs and symptoms of infection taught:  Yes  Wound care reviewed and will be taught at the time of discharge  Central venous catheter care will be taught at the time of discharge (if applicable)    Post-op follow-up:  Instructional materials used/given/mailed:  [facility] Surgery Booklet, post op teaching sheet, Map, Soap, and arrival/location information    Surgical instructions mailed to patient

## 2021-03-11 NOTE — PROGRESS NOTES
Do is a 30 year old who is being evaluated via a billable telephone visit.      What phone number would you like to be contacted at? [phone]   How would you like to obtain your AVS? [facility]    Assessment & Plan   Problem List Items Addressed This Visit     None      Visit Diagnoses     Recurrent major depressive disorder, in partial remission (H)    -  Primary       symptoms currently stable; refill of medications sent to pharmacy             Depression Screening Follow Up    PHQ 3/11/2021   PHQ-9 Total Score 3   Q9: Thoughts of better off dead/self-harm past 2 weeks Several days         No follow-ups on file.    VERENA Obregon CNP  [facility]    Subjective   Do is a 30 year old who presents for the following health issues   HPI       Depression Followup    How are you doing with your depression since your last visit? No change; basically feels stable    Are you having other symptoms that might be associated with depression? No    Have you had a significant life event?  No     Are you feeling anxious or having panic attacks?   No    Do you have any concerns with your use of alcohol or other drugs? No     Fleeting thoughts of self harm without plan; will be seeing therapist this week        Appetite, sleep stable; work has been manageable. Son now in  at [employer] [facility] [employer]; going well.        Social History     Tobacco Use     Smoking status: Former Smoker     Smokeless tobacco: Former User     Quit date: 1/1/2012   Substance Use Topics     Alcohol use: Not Currently     Comment: sober 11 years     Drug use: Never     PHQ 3/11/2021   PHQ-9 Total Score 3   Q9: Thoughts of better off dead/self-harm past 2 weeks Several days     DEBBIE-7 SCORE 3/11/2021   Total Score 0     Last PHQ-9 3/11/2021   1.  Little interest or pleasure in doing things 0   2.  Feeling down, depressed, or hopeless 1   3.  Trouble falling or staying asleep, or sleeping too much 0   4.  Feeling tired or  having little energy 0   5.  Poor appetite or overeating 0   6.  Feeling bad about yourself 1   7.  Trouble concentrating 0   8.  Moving slowly or restless 0   Q9: Thoughts of better off dead/self-harm past 2 weeks 1   PHQ-9 Total Score 3   Difficulty at work, home, or with people Not difficult at all     DEBBIE-7  3/11/2021   1. Feeling nervous, anxious, or on edge 0   2. Not being able to stop or control worrying 0   3. Worrying too much about different things 0   4. Trouble relaxing 0   5. Being so restless that it is hard to sit still 0   6. Becoming easily annoyed or irritable 0   7. Feeling afraid, as if something awful might happen 0   DEBBIE-7 Total Score 0   If you checked any problems, how difficult have they made it for you to do your work, take care of things at home, or get along with other people? Not difficult at all       Suicide Assessment Five-step Evaluation and Treatment (SAFE-T)      How many servings of fruits and vegetables do you eat daily?  2-3    On average, how many sweetened beverages do you drink each day (Examples: soda, juice, sweet tea, etc.  Do NOT count diet or artificially sweetened beverages)?   0    How many days per week do you exercise enough to make your heart beat faster? 3 or less    How many minutes a day do you exercise enough to make your heart beat faster? 10 - 19    How many days per week do you miss taking your medication? 0        Review of Systems   Constitutional, HEENT, cardiovascular, pulmonary, gi and gu systems are negative, except as otherwise noted.      Objective             Physical Exam   healthy, alert and no distress  PSYCH: Alert and oriented times 3; coherent speech, normal   rate and volume, able to articulate logical thoughts, able   to abstract reason, no tangential thoughts, no hallucinations   or delusions  Her affect is normal  RESP: No cough, no audible wheezing, able to talk in full sentences  Remainder of exam unable to be completed due to telephone  visits        Phone call duration: 20 minutes

## 2021-03-11 NOTE — PATIENT INSTRUCTIONS
You met with Dr. Abhijit Morales.      Today's visit instructions:    Dr. Morales would like you to undergo a CT scan.  He will call you with results and recommendations.    Please take Zofran (antinausea medication) 30-60 minutes before the x-ray.     Reminder:  Surgery Requirements  1. Your surgery will be at [facility]- 5th Floor  2. You will need to arrive 1 1/2 to 2 hours early based on the location of your surgery.  3. You will need someone to drive you home (over 18 years old) and stay with you for 24 hours after the procedure  4. You will need a preop physical with your regular doctor (or PAC if requested by your surgeon) within 30 days of surgery- closer is always better  5. Stop any blood thinners, vitamins, minerals, or herbal supplements 5 days before surgery.  If you are taking a prescribed blood thinner please let us know for specific instructions  6. Fasting- a nurse from [facility] will call you 1-2 days before surgery to confirm your procedure and tell you when to stop eating and drinking  7. Wash with the soap the night before surgery and morning of surgery. See instructions in the Surgery Packet.  8. If you would like a procedure estimate please call Elisa HERNANDEZ Financial Counselor at [phone] or [phone].    At this time, any patient that does not have COVID-19 testing within 4 days of surgery and results available to the surgeon and anesthesia team before the procedure may have their procedure postponed or canceled. We do this to keep our patients, providers, and employees safe. If you decline to test, then you will need to contact your surgeon to determine when or if your procedure will still take place.    OR    We highly encourage patients to get tested for COVID-19 at one of our designated [facility] testing sites. We process the tests in our lab, which allows us to get the results quickly. If you choose to get tested at a non-[facility]  location, you will need to contact your primary care provider to make those arrangements and ensure the results are available to your surgeon before you arrive for your procedure. If we do not receive the results in time, your procedure may be postponed or canceled. Please make sure your test is collected 3-days prior to your procedure date. The results will need to get faxed to [phone].     If you have questions please contact Anais العلي during regular clinic hours, Monday through Friday 7:30 AM - 4:00 PM, or you can contact us via [facility] at anytime.       If you have urgent needs after-hours, weekends, or holidays please call the hospital at [phone] and ask to speak with our on-call General Surgery Team.    Appointment [phone], option #1   Nurse Advice (Anais): [phone]   Surgery Scheduler (Demi): [phone]  Fax: [phone]    After Your Open Inguinal Hernia Repair         Incision care     You may take a shower the day after surgery. Carefully wash your incision with soap and water. Do not submerge yourself in water (bath, whirlpool, hot tub, pool, lake) for 14 days after surgery.     Remove the bandage the day after surgery, but leave the medical tape (Steri-Strips) or glue in place. These will loosen and fall off on their own 5 to 7 days after surgery.      Always wash your hands before touching your incisions or removing bandages.     It is not unusual to form a collection of fluid or blood under your incision that may feel firm or squishy- it can take several weeks to months for your body to reabsorb it.  At times, it may even drain.  If that should happen keep the area clean with soap, water,  and cover with a clean gauze dressing. You can change this daily or as needed.     Other medicines     Wait to start aspirin or blood thinners until the day after surgery. You can take any other regular medicines at your normal time the day after surgery.     Your pain medicine may  cause constipation (hard, dry stools). To help with this, take the stool softener your doctor gave you or an over-the-counter stool softener or laxative. You can stop taking this when you are no longer taking pain medicine and your bowel movements are back to normal.      For pain or discomfort     Take the narcotic pain medicine your doctor gave you as needed and as instructed on the bottle. If you prefer to use over-the-counter medication, use acetaminophen (Tylenol) or ibuprofen (Advil, Motrin) as instructed on the box. Do not take Tylenol if it is in your narcotic pain medication.      Use an ice pack on your groin for 20 minutes at a time as needed for the first 24 hours. Be sure to protect your skin by putting a cloth between the ice pack and your skin.     After 24 hours you can switch to heat for 20 minutes as needed. Be sure to protect your skin by putting a cloth between the heat pack and your skin.      It is normal to feel a lump in your groin after surgery. This lump may take up to 8 weeks to go away.      Activities     No driving until you feel it s safe to do so. Don t drive while taking narcotic pain medicine.     Don t lift anything heavier than 20 pounds for 3 weeks after surgery.      Special equipment     If we gave you an athletic supporter, wear this for the first 3 days after surgery. You can wear it longer than this if you wish.      Diet   You can eat your regular meals after surgery.      When to call the doctor   Call your doctor if you have:     A fever above 101 F (38.3 C) (taken under the tongue), or a fever or chills lasting more than a day.     Redness at the incision site.     Any fluid or blood draining from the incision, especially if it smells bad.      Severe pain that doesn t improve with pain medicine.      We will call you 2 to 4 days after surgery to review this handout, answer questions and help arrange after-surgery care. If you have questions or concerns, please call  [phone] during regular office hours. If you need to call after business hours, call [phone] and ask to page the surgeon on-call.

## 2021-03-12 ASSESSMENT — ANXIETY QUESTIONNAIRES: GAD7 TOTAL SCORE: 0

## 2021-03-13 ENCOUNTER — NURSE TRIAGE (OUTPATIENT)
Dept: NURSING | Facility: CLINIC | Age: 31
End: 2021-03-13

## 2021-03-13 ENCOUNTER — VIRTUAL VISIT (OUTPATIENT)
Dept: URGENT CARE | Facility: CLINIC | Age: 31
End: 2021-03-13
Payer: COMMERCIAL

## 2021-03-13 DIAGNOSIS — R10.84 ABDOMINAL PAIN, GENERALIZED: Primary | ICD-10-CM

## 2021-03-13 PROCEDURE — 99213 OFFICE O/P EST LOW 20 MIN: CPT | Mod: 95

## 2021-03-13 NOTE — TELEPHONE ENCOUNTER
Severe abdominal/right groin pain. Pain has become more severe today. Has been going on for a week.  Do was in an ER for the pain last week.    Patient doesn't have an allergy to contrast die.  Someone for some reason put iodine as an allergy into Do's chart.    Saw a surgeon but because iodine being on record as an allergy, even though it was removed, a CT scan with contrast will not be done.    Patient stated she has three hidden hernias.    Patient doesn't know what to do but knows she doesn't want to return to the ER.      Do has an appointment with another surgeon on Monday, 3/15/21.  Until then since her pain has worsened today, she wanted to know what to do.  It's very doubtful a provider doing a virtual visit will prescribe anything controlled for Do's pain. Do doesn't feel well enough to go anywhere so will make an appointment for a virtual visit.  I transferred Do to [facility].    COVID 19 Nurse Triage Plan/Patient Instructions    Please be aware that novel coronavirus (COVID-19) may be circulating in the community. If you develop symptoms such as fever, cough, or SOB or if you have concerns about the presence of another infection including coronavirus (COVID-19), please contact your health care provider or visit [email or web address].     Disposition/Instructions    Virtual Visit with provider recommended. Reference Visit Selection Guide.    Thank you for taking steps to prevent the spread of this virus.  o Limit your contact with others.  o Wear a simple mask to cover your cough.  o Wash your hands well and often.    Resources    [facility]: About COVID-19: [email or web address]    CDC: What to Do If You're Sick: www.cdc.gov/coronavirus/2019-ncov/about/steps-when-sick.html    CDC: Ending Home Isolation: www.cdc.gov/coronavirus/2019-ncov/hcp/disposition-in-home-patients.html     CDC: Caring for Someone:  www.cdc.gov/coronavirus/2019-ncov/if-you-are-sick/care-for-someone.html     [facility]: Interim Guidance for Hospital Discharge to Home: www.health.[facility].mn.us/diseases/coronavirus/hcp/hospdischarge.pdf    [facility] clinical trials (COVID-19 research studies): [email or web address]     Below are the COVID-19 hotlines at the [place] Department of Health ([facility]). Interpreters are available.   o For health questions: Call 289-449-0770 or 1-412.923.6210 (7 a.m. to 7 p.m.)  o For questions about schools and childcare: Call 090-251-2213 or 1-388.204.6938 (7 a.m. to 7 p.m.)     Additional Information    Negative: Shock suspected (e.g., cold/pale/clammy skin, too weak to stand, low BP, rapid pulse)    Negative: Difficult to awaken or acting confused (e.g., disoriented, slurred speech)    Negative: Passed out (i.e., lost consciousness, collapsed and was not responding)    Negative: Sounds like a life-threatening emergency to the triager    [1] MILD-MODERATE pain AND [2] constant AND [3] present > 2 hours    Protocols used: ABDOMINAL PAIN - FEMALE-A-AH    Debby MARCUS RN [facility]

## 2021-03-14 ENCOUNTER — APPOINTMENT (OUTPATIENT)
Dept: CT IMAGING | Facility: CLINIC | Age: 31
End: 2021-03-14
Attending: EMERGENCY MEDICINE
Payer: COMMERCIAL

## 2021-03-14 ENCOUNTER — HOSPITAL ENCOUNTER (EMERGENCY)
Facility: CLINIC | Age: 31
Discharge: HOME | End: 2021-03-14
Attending: EMERGENCY MEDICINE | Admitting: EMERGENCY MEDICINE
Payer: COMMERCIAL

## 2021-03-14 ENCOUNTER — APPOINTMENT (OUTPATIENT)
Dept: ULTRASOUND IMAGING | Facility: CLINIC | Age: 31
End: 2021-03-14
Attending: EMERGENCY MEDICINE
Payer: COMMERCIAL

## 2021-03-14 VITALS
WEIGHT: 144 LBS | DIASTOLIC BLOOD PRESSURE: 71 MMHG | TEMPERATURE: 99 F | RESPIRATION RATE: 16 BRPM | HEART RATE: 72 BPM | SYSTOLIC BLOOD PRESSURE: 116 MMHG | OXYGEN SATURATION: 97 % | BODY MASS INDEX: 24.72 KG/M2

## 2021-03-14 DIAGNOSIS — R10.31 ABDOMINAL PAIN, RIGHT LOWER QUADRANT: ICD-10-CM

## 2021-03-14 LAB
ALBUMIN SERPL-MCNC: 4 G/DL (ref 3.4–5)
ALBUMIN UR-MCNC: NEGATIVE MG/DL
ALP SERPL-CCNC: 53 U/L (ref 40–150)
ALT SERPL W P-5'-P-CCNC: 17 U/L (ref 0–50)
ANION GAP SERPL CALCULATED.3IONS-SCNC: 5 MMOL/L (ref 3–14)
APPEARANCE UR: CLEAR
AST SERPL W P-5'-P-CCNC: 9 U/L (ref 0–45)
BACTERIA #/AREA URNS HPF: ABNORMAL /HPF
BASOPHILS # BLD AUTO: 0.1 10E9/L (ref 0–0.2)
BASOPHILS NFR BLD AUTO: 0.6 %
BILIRUB SERPL-MCNC: 0.3 MG/DL (ref 0.2–1.3)
BILIRUB UR QL STRIP: NEGATIVE
BUN SERPL-MCNC: 9 MG/DL (ref 7–30)
CALCIUM SERPL-MCNC: 9.1 MG/DL (ref 8.5–10.1)
CHLORIDE SERPL-SCNC: 106 MMOL/L (ref 94–109)
CO2 SERPL-SCNC: 27 MMOL/L (ref 20–32)
COLOR UR AUTO: ABNORMAL
CREAT SERPL-MCNC: 0.75 MG/DL (ref 0.52–1.04)
DIFFERENTIAL METHOD BLD: NORMAL
EOSINOPHIL # BLD AUTO: 0.3 10E9/L (ref 0–0.7)
EOSINOPHIL NFR BLD AUTO: 3.9 %
ERYTHROCYTE [DISTWIDTH] IN BLOOD BY AUTOMATED COUNT: 12.4 % (ref 10–15)
GFR SERPL CREATININE-BSD FRML MDRD: >90 ML/MIN/{1.73_M2}
GLUCOSE SERPL-MCNC: 91 MG/DL (ref 70–99)
GLUCOSE UR STRIP-MCNC: NEGATIVE MG/DL
HCG UR QL: NEGATIVE
HCT VFR BLD AUTO: 41.2 % (ref 35–47)
HGB BLD-MCNC: 13.8 G/DL (ref 11.7–15.7)
HGB UR QL STRIP: NEGATIVE
IMM GRANULOCYTES # BLD: 0 10E9/L (ref 0–0.4)
IMM GRANULOCYTES NFR BLD: 0.3 %
INTERNAL QC OK POCT: YES
KETONES UR STRIP-MCNC: NEGATIVE MG/DL
LEUKOCYTE ESTERASE UR QL STRIP: NEGATIVE
LIPASE SERPL-CCNC: 132 U/L (ref 73–393)
LYMPHOCYTES # BLD AUTO: 2.9 10E9/L (ref 0.8–5.3)
LYMPHOCYTES NFR BLD AUTO: 36.3 %
MCH RBC QN AUTO: 29.9 PG (ref 26.5–33)
MCHC RBC AUTO-ENTMCNC: 33.5 G/DL (ref 31.5–36.5)
MCV RBC AUTO: 89 FL (ref 78–100)
MONOCYTES # BLD AUTO: 0.6 10E9/L (ref 0–1.3)
MONOCYTES NFR BLD AUTO: 7.2 %
NEUTROPHILS # BLD AUTO: 4.1 10E9/L (ref 1.6–8.3)
NEUTROPHILS NFR BLD AUTO: 51.7 %
NITRATE UR QL: NEGATIVE
NRBC # BLD AUTO: 0 10*3/UL
NRBC BLD AUTO-RTO: 0 /100
PH UR STRIP: 7 PH (ref 5–7)
PLATELET # BLD AUTO: 341 10E9/L (ref 150–450)
POTASSIUM SERPL-SCNC: 4 MMOL/L (ref 3.4–5.3)
PROT SERPL-MCNC: 7.7 G/DL (ref 6.8–8.8)
RBC # BLD AUTO: 4.61 10E12/L (ref 3.8–5.2)
RBC #/AREA URNS AUTO: 1 /HPF (ref 0–2)
SODIUM SERPL-SCNC: 138 MMOL/L (ref 133–144)
SOURCE: ABNORMAL
SP GR UR STRIP: 1.02 (ref 1–1.03)
SPECIMEN SOURCE: NORMAL
SQUAMOUS #/AREA URNS AUTO: 1 /HPF (ref 0–1)
UROBILINOGEN UR STRIP-MCNC: NORMAL MG/DL (ref 0–2)
WBC # BLD AUTO: 8 10E9/L (ref 4–11)
WBC #/AREA URNS AUTO: 1 /HPF (ref 0–5)
WET PREP SPEC: NORMAL

## 2021-03-14 PROCEDURE — 76830 TRANSVAGINAL US NON-OB: CPT | Mod: 26 | Performed by: RADIOLOGY

## 2021-03-14 PROCEDURE — 81001 URINALYSIS AUTO W/SCOPE: CPT | Performed by: EMERGENCY MEDICINE

## 2021-03-14 PROCEDURE — 250N000011 HC RX IP 250 OP 636: Performed by: EMERGENCY MEDICINE

## 2021-03-14 PROCEDURE — 76830 TRANSVAGINAL US NON-OB: CPT

## 2021-03-14 PROCEDURE — 87210 SMEAR WET MOUNT SALINE/INK: CPT | Performed by: EMERGENCY MEDICINE

## 2021-03-14 PROCEDURE — 74177 CT ABD & PELVIS W/CONTRAST: CPT | Mod: 26 | Performed by: RADIOLOGY

## 2021-03-14 PROCEDURE — 76856 US EXAM PELVIC COMPLETE: CPT | Mod: 26 | Performed by: RADIOLOGY

## 2021-03-14 PROCEDURE — 99285 EMERGENCY DEPT VISIT HI MDM: CPT | Performed by: EMERGENCY MEDICINE

## 2021-03-14 PROCEDURE — 81025 URINE PREGNANCY TEST: CPT | Performed by: EMERGENCY MEDICINE

## 2021-03-14 PROCEDURE — 74177 CT ABD & PELVIS W/CONTRAST: CPT

## 2021-03-14 PROCEDURE — 85025 COMPLETE CBC W/AUTO DIFF WBC: CPT | Performed by: EMERGENCY MEDICINE

## 2021-03-14 PROCEDURE — 96374 THER/PROPH/DIAG INJ IV PUSH: CPT | Mod: 59 | Performed by: EMERGENCY MEDICINE

## 2021-03-14 PROCEDURE — 258N000003 HC RX IP 258 OP 636: Performed by: EMERGENCY MEDICINE

## 2021-03-14 PROCEDURE — 99285 EMERGENCY DEPT VISIT HI MDM: CPT | Mod: 25 | Performed by: EMERGENCY MEDICINE

## 2021-03-14 PROCEDURE — 80053 COMPREHEN METABOLIC PANEL: CPT | Performed by: EMERGENCY MEDICINE

## 2021-03-14 PROCEDURE — 96376 TX/PRO/DX INJ SAME DRUG ADON: CPT | Performed by: EMERGENCY MEDICINE

## 2021-03-14 PROCEDURE — 87491 CHLMYD TRACH DNA AMP PROBE: CPT | Performed by: EMERGENCY MEDICINE

## 2021-03-14 PROCEDURE — 87591 N.GONORRHOEAE DNA AMP PROB: CPT | Performed by: EMERGENCY MEDICINE

## 2021-03-14 PROCEDURE — 83690 ASSAY OF LIPASE: CPT | Performed by: EMERGENCY MEDICINE

## 2021-03-14 PROCEDURE — 96375 TX/PRO/DX INJ NEW DRUG ADDON: CPT | Performed by: EMERGENCY MEDICINE

## 2021-03-14 RX ORDER — HYDROCODONE BITARTRATE AND ACETAMINOPHEN 5; 325 MG/1; MG/1
1 TABLET ORAL EVERY 6 HOURS PRN
Qty: 6 TABLET | Refills: 0 | Status: SHIPPED | OUTPATIENT
Start: 2021-03-14 | End: 2021-03-17

## 2021-03-14 RX ORDER — SENNA AND DOCUSATE SODIUM 50; 8.6 MG/1; MG/1
1 TABLET, FILM COATED ORAL AT BEDTIME
Qty: 30 TABLET | Refills: 0 | Status: SHIPPED | OUTPATIENT
Start: 2021-03-14

## 2021-03-14 RX ORDER — ONDANSETRON 2 MG/ML
8 INJECTION INTRAMUSCULAR; INTRAVENOUS
Status: COMPLETED | OUTPATIENT
Start: 2021-03-14 | End: 2021-03-14

## 2021-03-14 RX ORDER — HYDROMORPHONE HYDROCHLORIDE 1 MG/ML
0.5 INJECTION, SOLUTION INTRAMUSCULAR; INTRAVENOUS; SUBCUTANEOUS EVERY 30 MIN PRN
Status: DISCONTINUED | OUTPATIENT
Start: 2021-03-14 | End: 2021-03-14 | Stop reason: HOSPADM

## 2021-03-14 RX ORDER — IOPAMIDOL 755 MG/ML
86 INJECTION, SOLUTION INTRAVASCULAR ONCE
Status: COMPLETED | OUTPATIENT
Start: 2021-03-14 | End: 2021-03-14

## 2021-03-14 RX ADMIN — ONDANSETRON 8 MG: 2 INJECTION INTRAMUSCULAR; INTRAVENOUS at 17:50

## 2021-03-14 RX ADMIN — IOPAMIDOL 86 ML: 755 INJECTION, SOLUTION INTRAVENOUS at 18:56

## 2021-03-14 RX ADMIN — SODIUM CHLORIDE 1000 ML: 9 INJECTION, SOLUTION INTRAVENOUS at 17:48

## 2021-03-14 RX ADMIN — HYDROMORPHONE HYDROCHLORIDE 0.5 MG: 1 INJECTION, SOLUTION INTRAMUSCULAR; INTRAVENOUS; SUBCUTANEOUS at 19:57

## 2021-03-14 RX ADMIN — HYDROMORPHONE HYDROCHLORIDE 0.5 MG: 1 INJECTION, SOLUTION INTRAMUSCULAR; INTRAVENOUS; SUBCUTANEOUS at 17:50

## 2021-03-14 NOTE — PROGRESS NOTES
"Assessment & Plan     Abdominal Pain, History of inguinal hernia diagnosed 3 years ago    If fever continues and pain worsens or if you have vomiting, go into the ER to be seen.  If pain stays at a 5 like it has been the last 3 years, and you feel ok and the fever doesn't persist, then you can monitor and see the surgeon as planned on Monday.    Virtual Urgent Care  [facility] VIRTUAL URGENT CARE    Paolo Lei is a 30 year old female who presents to clinic today for the following health issues:    HPI    Diagnosed inguinal hernia 3 years ago, pain is at a 5 regularly, can't stand up straight today, nausea, no vomiting, has been living with this pain daily, pain is normally a 5 and has \"waves\" of pain, today went up to a 9, but now it is back to a 5.  Has been using heat packs.  Reports that she has a fever 99.8.  Says ibuprofen doesn't help.    Review of Systems  As above      Objective    LMP 02/21/2021   Physical Exam   This was a telephone visit, no exam done.      Jaqueline Linares PA-C, [facility] Virtual Urgent Care  3/13/2021  6:27 PM      "

## 2021-03-14 NOTE — ED PROVIDER NOTES
History     Chief Complaint   Patient presents with     Abdominal Pain     HPI  Do Hassan is a 30 year old female with PMH notable for Óscar-Danlos syndrome type III, endometriosis, POTS who presents to the ED with RLQ pain.  Patient reports that she has had similar, but less severe episodes of pain somewhat chronically, was in the ED for similar, but less severe pain just under 8 weeks ago.  Pain is significantly increased over the past 1-2 days.  She reports bowel movements have been difficult recently, not having diarrhea.  She has been nauseous, one episode of dry heaves upon arrival to the ED, otherwise only 1 episode of vomiting a week ago.  No fevers.  Mild discomfort in the RLQ with urination, but not dysuria.  LMP 3-4 weeks ago. She endorses change in vaginal discharge smell, no increase in amount. Patient endorses prior history of ovarian cyst s/p removal and reported internal hernia of some type.  She states her appendix has been removed in the past.    This part of the medical record was transcribed by Suleman Le, Medical Scribe, from a dictation done by Bright Jara MD.       Past Medical History  Past Medical History:   Diagnosis Date     Anxiety      Arthritis      Depression      Óscar-Danlos syndrome      Endometriosis      Endometriosis 02/2017    evasion of endometriosis     Heartburn      Intussusception (H) 1996     Osteoporosis      POTS (postural orthostatic tachycardia syndrome)      Spina bifida occulta      Substance abuse (H)      Past Surgical History:   Procedure Laterality Date     anterior posterior spinal fusion  2008    L5-S1     APPENDECTOMY       CYSTECTOMY OVARIAN BENIGN  2020    left     DAVINCI ASSISTED ABLATION / EXCISION OF ENDOMETRIOSIS  2017     ORTHOPEDIC SURGERY       famotidine (PEPCID) 40 MG tablet  FLUoxetine (PROZAC) 40 MG capsule  HYDROcodone-acetaminophen (NORCO) 5-325 MG tablet  magnesium citrate solution  propranolol (INDERAL) 10 MG  tablet  SENNA-docusate sodium (SENNA S) 8.6-50 MG tablet  ondansetron (ZOFRAN-ODT) 8 MG ODT tab  sodium chloride 1 GM tablet  UNABLE TO FIND      Allergies   Allergen Reactions     Contrast Dye Nausea     Family History  Family History   Problem Relation Age of Onset     Arthritis Mother      Thyroid Cancer Maternal Grandmother      Cancer Maternal Grandfather      Osteoporosis Maternal Grandfather      Cerebrovascular Disease Paternal Grandfather      Social History   Social History     Tobacco Use     Smoking status: Former Smoker     Smokeless tobacco: Former User     Quit date: 1/1/2012   Substance Use Topics     Alcohol use: Not Currently     Comment: sober 11 years     Drug use: Never      Past medical history, past surgical history, medications, allergies, family history, and social history were reviewed with the patient. No additional pertinent items.      Review of Systems  A complete review of systems was performed with pertinent positives and negatives noted in the HPI, and all other systems negative.    Physical Exam   BP: 117/65  Pulse: 86  Temp: 99  F (37.2  C)  Resp: 16  Weight: 65.3 kg (144 lb)  SpO2: 96 %    Physical Exam  General: moderately uncomfortable appearing. Appears stated age.   HENT: MMM, no oropharyngeal lesions  Eyes: PERRL, normal sclerae  Cardio: regular rate. Regular rhythm. Extremities well perfused  Resp: Normal work of breathing, normal respiratory rate.  Chest/Back: no visual signs of trauma, no CVA tenderness  Abdomen: R low abdomen into the right pelvis tenderness, non-distended, no rebound, no guarding  Pelvic: No external lesions. Thin white discharge. Cervical os closed. No CMT. Right adnexal tenderness present.   Neuro: alert and fully oriented. CN II-XII grossly intact. Grossly normal strength and sensation in all extremities.   MSK: no deformities. Grossly normal ROM in extremities.   Integumentary/Skin: no rash visualized, normal color  Psych: normal affect, normal  behavior    ED Course      Procedures           Labs Ordered and Resulted from Time of ED Arrival Up to the Time of Departure from the ED   ROUTINE UA WITH MICROSCOPIC REFLEX TO CULTURE - Abnormal; Notable for the following components:       Result Value    Bacteria Urine Moderate (*)     All other components within normal limits   HCG QUAL URINE POCT - Normal   CBC WITH PLATELETS DIFFERENTIAL   COMPREHENSIVE METABOLIC PANEL   LIPASE   PERIPHERAL IV CATHETER   PREP FOR PROCEDURE   WET PREP   CHLAMYDIA TRACHOMATIS PCR   NEISSERIA GONORRHOEAE PCR     CT Abdomen Pelvis w Contrast   Final Result      US Pelvis Cmplt w Transvag & Doppler LmtPel Duplex Limited   Final Result   IMPRESSION: Normal pelvic ultrasound. IUD in stable position.      I have personally reviewed the examination and initial interpretation   and I agree with the findings.      WAYNE SERRA MD             Assessments & Plan (with Medical Decision Making)   Patient presenting with RLQ/right pelvic pain in the context of chronic similar pain as well as a prior abdominal surgeries and prior ovarian cyst.  Exam notable for RLQ/right pelvic tenderness.  No peritoneal signs.  Vitals in the ED unremarkable. Nursing notes reviewed. Initial differential diagnosis includes but not limited to ovarian torsion, SBO, PID, chronic pain, constipation.     No RUQ pain nor LFT elevations to suggest hepatobiliary pathology. No lipase elevation to suggest pancreatitis. No peritoneal signs on exam to suggest peritonitis. Pelvic US without evidence of torsion. CT abdomen/pelvis demonstrated fairly large stool burden without evidence of other acute pathology.     In the ED, the patient's symptoms were managed with ondansetron and hydromorphone, with improvement in symptoms upon reassessment. NS bolus also given.     The complete clinical picture is not completely clear, but most consistent with intestinal colic due to colonic stool buildup. Offered patient observation  admission for further symptoms control; she noted that she has an appointment with her GI surgeon tomorrow and would rather be home tonight and go to the appointment. After counseling on the diagnosis, work-up, and treatment plan, the patient was discharged to home. Senna-docusate, PRN mag citrate, and very short course Norco provided. The patient was advised to follow-up with her surgeon tomorrow as already planned. The patient was advised to return to the ED if worsening symptoms, or if there are any urgent/life-threatening concerns.     This part of the medical record was transcribed by Suleman Le, Medical Scribe, from a dictation done by Bright Jara MD.       Final diagnoses:   Abdominal pain, right lower quadrant     Discharge Medication List as of 3/14/2021  9:41 PM      START taking these medications    Details   HYDROcodone-acetaminophen (NORCO) 5-325 MG tablet Take 1 tablet by mouth every 6 hours as needed for severe pain, Disp-6 tablet, R-0, E-Prescribe      magnesium citrate solution Take 296 mLs by mouth once as needed for constipation, Disp-296 mL, R-0, E-Prescribe      SENNA-docusate sodium (SENNA S) 8.6-50 MG tablet Take 1 tablet by mouth At Bedtime, Disp-30 tablet, R-0, E-Prescribe             --  Bright Jara MD   Emergency Medicine   [facility] EMERGENCY DEPARTMENT  3/14/2021     Bright Jara MD  03/14/21 3572

## 2021-03-14 NOTE — ED NOTES
Brief note:  Care Everywhere noted to show anaphylaxis as allergy reaction to contrast. No detail in Care Everywhere about what reaction she had previously, and no prior contrast studies in our system. Patient states it is inaccurate, and that she just gets vomiting unless she gets Zofran beforehand.     Discussed the risks, benefits, indications, and alternatives of using contrast with CT with the patient. The patient demonstrated understanding and capacity, and elected for the use of contrast.     --  Bright Jara MD  Emergency Medicine     Bright Jara MD  03/14/21 [id number]

## 2021-03-14 NOTE — ED TRIAGE NOTES
Pt presents ambulatory to triage with reports of RLQ abdominal pain. States she has had the pain for years but it got worse the past couple days but even worse in the past 24 hours. Patient also reports nausea, change in appetite and worsening pain when she stands up to flex her muscles. Denies vomiting or diarrhea. Reports she had an MRI 3 years ago and was diagnosed with of inguinal hernia. Also states she was suppose to do a CT recently but the facility refused due to her allergy. States she just needs Zofran before the CT and she would be okay.

## 2021-03-15 ENCOUNTER — TRANSFERRED RECORDS (OUTPATIENT)
Dept: HEALTH INFORMATION MANAGEMENT | Facility: CLINIC | Age: 31
End: 2021-03-15

## 2021-03-15 NOTE — DISCHARGE INSTRUCTIONS
Instructions from your doctor today:  Emergency Department testing is focused on the potential causes of your symptoms that are the most dangerous possibilities, and cannot cover every possibility. Based on the evaluation, it was deemed sufficiently safe to discharge and continue management through the clinics. Thus, follow-up is very important to assess for improvement/worsening, potential further testing, and potential treatment adjustments.     Please make an appointment to follow up with:  - your primary care provider within a few days and GI surgeon tomorrow as already planned  - If you do not have a primary care provider, you can be seen in follow-up and establish care by calling any of the clinics below:     - [facility] (phone: [phone])     - Primary Care / [facility] (phone: [phone])   - Have your provider review the results from today's visit with you again, including any potential follow-up or additional testing that may be needed based on the results. Occasionally, incidental findings are found on later review by radiologists that may need follow-up.     Return to the Emergency Department immediately if you have worsening symptoms, or any other urgent or potentially life-threatening concerns.

## 2021-03-22 ENCOUNTER — MYC MEDICAL ADVICE (OUTPATIENT)
Dept: FAMILY MEDICINE | Facility: CLINIC | Age: 31
End: 2021-03-22

## 2021-03-23 ENCOUNTER — NURSE TRIAGE (OUTPATIENT)
Dept: FAMILY MEDICINE | Facility: CLINIC | Age: 31
End: 2021-03-23

## 2021-03-23 NOTE — TELEPHONE ENCOUNTER
Spoke with pt regarding ongoing abdominal pain, bloody stools, and constipation.  Pt reports that her abdominal pain has been a chronic issue and has improved since her ED visit on 3/14, but pt has been experiencing bloody stools and constipation for the last couple of weeks. Pt reports that when she has bloody stools, it is typically a small amount passed separately. She has also been unable to have a BM without taking laxatives since her ED visit. Pt denies nausea or dizziness, but reports she has had a low-grade temp of around 99.5 for the last couple of weeks. Appt scheduled with PCP within 3 days per [facility] [place].    Do Sahu RN  [facility]       Additional Information    Negative: Passed out (i.e., fainted, collapsed and was not responding)    Negative: Shock suspected (e.g., cold/pale/clammy skin, too weak to stand, low BP, rapid pulse)    Negative: Vomiting red blood or black (coffee ground) material    Negative: Sounds like a life-threatening emergency to the triager    Negative: Diarrhea is the main symptom    Negative: Rectal symptoms    Negative: SEVERE rectal bleeding (large blood clots; on and off, or constant bleeding)    Negative: SEVERE dizziness (e.g., unable to stand, requires support to walk, feels like passing out now)    Negative: MODERATE rectal bleeding (small blood clots, passing blood without stool, or toilet water turns red) more than once a day    Negative: Bloody, black, or tarry bowel movements    Negative: High-risk adult (e.g., prior surgery on aorta, abdominal aortic aneurysm)    Negative: Rectal foreign body (inserted or swallowed)    Negative: SEVERE abdominal pain (e.g., excruciating)    Negative: Constant abdominal pain lasting > 2 hours    Negative: Pale skin (pallor) of new onset or worsening    Negative: Patient sounds very sick or weak to the triager    Negative: MODERATE rectal bleeding (small blood clots, passing blood without stool, or toilet water turns  "red)    Negative: Taking Coumadin (warfarin) or other strong blood thinner, or known bleeding disorder (e.g., thrombocytopenia)    Negative: Colonoscopy in past 72 hours    Negative: Known cirrhosis of the liver (or history of liver failure or ascites)    Negative: Patient wants to be seen    MILD rectal bleeding (more than just a few drops or streaks)    Answer Assessment - Initial Assessment Questions  1. APPEARANCE of BLOOD: \"What color is it?\" \"Is it passed separately, on the surface of the stool, or mixed in with the stool?\"       Bright red, passed separately  2. AMOUNT: \"How much blood was passed?\"       A few drops  3. FREQUENCY: \"How many times has blood been passed with the stools?\"       Once a day for the last month  4. ONSET: \"When was the blood first seen in the stools?\" (Days or weeks)       4 weeks ago    5. DIARRHEA: \"Is there also some diarrhea?\" If so, ask: \"How many diarrhea stools were passed in past 24 hours?\"       No  6. CONSTIPATION: \"Do you have constipation?\" If so, \"How bad is it?\"      Yes, has been constipated for last two weeks  7. RECURRENT SYMPTOMS: \"Have you had blood in your stools before?\" If so, ask: \"When was the last time?\" and \"What happened that time?\"       Yes, it happened in the past when she had a hemorrhoid after giving birth    8. BLOOD THINNERS: \"Do you take any blood thinners?\" (e.g., Coumadin/warfarin, Pradaxa/dabigatran, aspirin)      No  9. OTHER SYMPTOMS: \"Do you have any other symptoms?\"  (e.g., abdominal pain, vomiting, dizziness, fever)      Abdominal pain, has had a low grade temp for a few weeks  10. PREGNANCY: \"Is there any chance you are pregnant?\" \"When was your last menstrual period?\"        No, LMP 3/23    Protocols used: RECTAL BLEEDING-A-[place]      "

## 2021-03-23 NOTE — TELEPHONE ENCOUNTER
See triage encounter from 3/23. No further action needed.    Do Sahu RN  [facility]

## 2021-03-25 ENCOUNTER — VIRTUAL VISIT (OUTPATIENT)
Dept: FAMILY MEDICINE | Facility: CLINIC | Age: 31
End: 2021-03-25
Payer: COMMERCIAL

## 2021-03-25 DIAGNOSIS — M41.9 SCOLIOSIS, UNSPECIFIED SCOLIOSIS TYPE, UNSPECIFIED SPINAL REGION: ICD-10-CM

## 2021-03-25 DIAGNOSIS — K56.1 INTUSSUSCEPTION (H): ICD-10-CM

## 2021-03-25 DIAGNOSIS — K59.00 CONSTIPATION, UNSPECIFIED CONSTIPATION TYPE: Primary | ICD-10-CM

## 2021-03-25 DIAGNOSIS — M51.369 DDD (DEGENERATIVE DISC DISEASE), LUMBAR: ICD-10-CM

## 2021-03-25 PROBLEM — N80.9 ENDOMETRIOSIS: Status: RESOLVED | Noted: 2017-02-11 | Resolved: 2021-03-25

## 2021-03-25 PROCEDURE — 99214 OFFICE O/P EST MOD 30 MIN: CPT | Mod: 95 | Performed by: NURSE PRACTITIONER

## 2021-03-25 NOTE — PROGRESS NOTES
Do is a 30 year old who is being evaluated via a billable telephone visit.      What phone number would you like to be contacted at? [phone]     How would you like to obtain your AVS? [facility]    Assessment & Plan   Problem List Items Addressed This Visit     Scoliosis    Relevant Orders    Orthopedic & Spine  Referral    Intussusception (H)    DDD (degenerative disc disease), lumbar    Relevant Orders    Orthopedic & Spine  Referral      Other Visit Diagnoses     Constipation, unspecified constipation type    -  Primary    Relevant Orders    GASTROENTEROLOGY ADULT REF CONSULT ONLY             30 minutes spent on the date of the encounter doing chart review, history and exam, documentation and further activities as noted above           No follow-ups on file.    VERENA Obregon CNP  [facility]    Subjective   Do is a 30 year old who presents for the following health issues   HPI     ED/UC Followup:    Facility:  [facility]  Date of visit: 3/14/2021  Reason for visit: Abdominal pain  Current Status: Still has pain and blood in stool which comes and goes. Elevated temp on and off 99.5 -has constipation     History of Arthritis in lower back and scoliosis; has a history of spinal fusion in 2008. She has some intermittent pain, currently well managed. She would like to establish care with an orthopedic for follow up of her scoliosis and arthritis.     Review of Systems   Constitutional, HEENT, cardiovascular, pulmonary, gi and gu systems are negative, except as otherwise noted.      Objective           Vitals:  No vitals were obtained today due to virtual visit.    Physical Exam   healthy, alert and no distress  PSYCH: Alert and oriented times 3; coherent speech, normal   rate and volume, able to articulate logical thoughts, able   to abstract reason, no tangential thoughts, no hallucinations   or delusions  Her affect is  normal  RESP: No cough, no audible wheezing, able to talk in full sentences  Remainder of exam unable to be completed due to telephone visits    Admission on 03/14/2021, Discharged on 03/14/2021   Component Date Value Ref Range Status     HCG Qual Urine 03/14/2021 Negative  neg Final     Internal QC OK 03/14/2021 Yes   Final     WBC 03/14/2021 8.0  4.0 - 11.0 10e9/L Final     RBC Count 03/14/2021 4.61  3.8 - 5.2 10e12/L Final     Hemoglobin 03/14/2021 13.8  11.7 - 15.7 g/dL Final     Hematocrit 03/14/2021 41.2  35.0 - 47.0 % Final     MCV 03/14/2021 89  78 - 100 fl Final     MCH 03/14/2021 29.9  26.5 - 33.0 pg Final     MCHC 03/14/2021 33.5  31.5 - 36.5 g/dL Final     RDW 03/14/2021 12.4  10.0 - 15.0 % Final     Platelet Count 03/14/2021 341  150 - 450 10e9/L Final     Diff Method 03/14/2021 Automated Method   Final     % Neutrophils 03/14/2021 51.7  % Final     % Lymphocytes 03/14/2021 36.3  % Final     % Monocytes 03/14/2021 7.2  % Final     % Eosinophils 03/14/2021 3.9  % Final     % Basophils 03/14/2021 0.6  % Final     % Immature Granulocytes 03/14/2021 0.3  % Final     Nucleated RBCs 03/14/2021 0  0 /100 Final     Absolute Neutrophil 03/14/2021 4.1  1.6 - 8.3 10e9/L Final     Absolute Lymphocytes 03/14/2021 2.9  0.8 - 5.3 10e9/L Final     Absolute Monocytes 03/14/2021 0.6  0.0 - 1.3 10e9/L Final     Absolute Eosinophils 03/14/2021 0.3  0.0 - 0.7 10e9/L Final     Absolute Basophils 03/14/2021 0.1  0.0 - 0.2 10e9/L Final     Abs Immature Granulocytes 03/14/2021 0.0  0 - 0.4 10e9/L Final     Absolute Nucleated RBC 03/14/2021 0.0   Final     Sodium 03/14/2021 138  133 - 144 mmol/L Final     Potassium 03/14/2021 4.0  3.4 - 5.3 mmol/L Final     Chloride 03/14/2021 106  94 - 109 mmol/L Final     Carbon Dioxide 03/14/2021 27  20 - 32 mmol/L Final     Anion Gap 03/14/2021 5  3 - 14 mmol/L Final     Glucose 03/14/2021 91  70 - 99 mg/dL Final     Urea Nitrogen 03/14/2021 9  7 - 30 mg/dL Final     Creatinine 03/14/2021  0.75  0.52 - 1.04 mg/dL Final     GFR Estimate 03/14/2021 >90  >60 mL/min/[1.73_m2] Final    Comment: Non  GFR Calc  Starting 12/18/2018, serum creatinine based estimated GFR (eGFR) will be   calculated using the Chronic Kidney Disease Epidemiology Collaboration   (CKD-EPI) equation.       GFR Estimate If Black 03/14/2021 >90  >60 mL/min/[1.73_m2] Final    Comment:  GFR Calc  Starting 12/18/2018, serum creatinine based estimated GFR (eGFR) will be   calculated using the Chronic Kidney Disease Epidemiology Collaboration   (CKD-EPI) equation.       Calcium 03/14/2021 9.1  8.5 - 10.1 mg/dL Final     Bilirubin Total 03/14/2021 0.3  0.2 - 1.3 mg/dL Final     Albumin 03/14/2021 4.0  3.4 - 5.0 g/dL Final     Protein Total 03/14/2021 7.7  6.8 - 8.8 g/dL Final     Alkaline Phosphatase 03/14/2021 53  40 - 150 U/L Final     ALT 03/14/2021 17  0 - 50 U/L Final     AST 03/14/2021 9  0 - 45 U/L Final     Lipase 03/14/2021 132  73 - 393 U/L Final     Color Urine 03/14/2021 Light Yellow   Final     Appearance Urine 03/14/2021 Clear   Final     Glucose Urine 03/14/2021 Negative  NEG^Negative mg/dL Final     Bilirubin Urine 03/14/2021 Negative  NEG^Negative Final     Ketones Urine 03/14/2021 Negative  NEG^Negative mg/dL Final     Specific Gravity Urine 03/14/2021 1.017  1.003 - 1.035 Final     Blood Urine 03/14/2021 Negative  NEG^Negative Final     pH Urine 03/14/2021 7.0  5.0 - 7.0 pH Final     Protein Albumin Urine 03/14/2021 Negative  NEG^Negative mg/dL Final     Urobilinogen mg/dL 03/14/2021 Normal  0.0 - 2.0 mg/dL Final     Nitrite Urine 03/14/2021 Negative  NEG^Negative Final     Leukocyte Esterase Urine 03/14/2021 Negative  NEG^Negative Final     Source 03/14/2021 Midstream Urine   Final     WBC Urine 03/14/2021 1  0 - 5 /HPF Final     RBC Urine 03/14/2021 1  0 - 2 /HPF Final     Bacteria Urine 03/14/2021 Moderate* NEG^Negative /HPF Final     Squamous Epithelial /HPF Urine 03/14/2021 1  0 - 1  /HPF Final     Specimen Description 03/14/2021 Vagina   Final     Wet Prep 03/14/2021 No clue cells seen   Final     Wet Prep 03/14/2021 No Trichomonas seen   Final     Wet Prep 03/14/2021 No yeast seen   Final     Wet Prep 03/14/2021    Final                    Value:Few  PMNs seen       Specimen Description 03/14/2021 Vagina   Final     Chlamydia Trachomatis PCR 03/14/2021 Negative  NEG^Negative Final    Comment: Negative for C. trachomatis rRNA by transcription mediated amplification.  A negative result by transcription mediated amplification does not preclude   the presence of C. trachomatis infection because results are dependent on   proper and adequate collection, absence of inhibitors, and sufficient rRNA to   be detected.       Specimen Descrip 03/14/2021 Vagina   Final     N Gonorrhea PCR 03/14/2021 Negative  NEG^Negative Final    Comment: Negative for N. gonorrhoeae rRNA by transcription mediated amplification.  A negative result by transcription mediated amplification does not preclude   the presence of N. gonorrhoeae infection because results are dependent on   proper and adequate collection, absence of inhibitors, and sufficient rRNA to   be detected.                 Phone call duration: 20 minutes

## 2021-03-29 ENCOUNTER — TRANSFERRED RECORDS (OUTPATIENT)
Dept: HEALTH INFORMATION MANAGEMENT | Facility: CLINIC | Age: 31
End: 2021-03-29

## 2021-04-14 NOTE — TELEPHONE ENCOUNTER
REFERRAL INFORMATION:    Referring Provider:  VERENA Choudhury CNP     Referring Clinic:  [facility]     Reason for Visit/Diagnosis: Abdominal pain, Constipation      FUTURE VISIT INFORMATION:    Appointment Date: 4/21/2021    Appointment Time: 11 AM      NOTES STATUS DETAILS   OFFICE NOTE from Referring Provider Internal 3/25/2021 Office visit with VERENA Choudhury CNP      OFFICE NOTE from Other Specialist Internal 3/23/2021, 3/13/2021, 3/7/2021  Nurse Triage     3/13/2021 Office visit with Jaqueline Linares PA-C      3/11/2021 Office visit with Dr. Abhijit Morales ([facility])      HOSPITAL DISCHARGE SUMMARY/  ED VISITS Internal 3/14/2021, 3/5/2021 ([facility])   OPERATIVE REPORT N/A    MEDICATION LIST Internal         ENDOSCOPY  N/A    COLONOSCOPY N/A    ERCP N/A    EUS N/A    STOOL TESTING N/A    PERTINENT LABS Internal/ Care Everywhere    PATHOLOGY REPORTS (RELATED) N/A    IMAGING (CT, MRI, EGD, MRCP, Small Bowel Follow Through/SBT, MR/CT Enterography) Internal CT Abdomen Pelvis: 3/14/2021  US Pelvis: 3/14/2021, 3/5/2021, 8/2/2020

## 2021-04-20 ENCOUNTER — MYC MEDICAL ADVICE (OUTPATIENT)
Dept: FAMILY MEDICINE | Facility: CLINIC | Age: 31
End: 2021-04-20

## 2021-04-20 ENCOUNTER — VIRTUAL VISIT (OUTPATIENT)
Dept: FAMILY MEDICINE | Facility: CLINIC | Age: 31
End: 2021-04-20
Payer: COMMERCIAL

## 2021-04-20 DIAGNOSIS — J06.9 VIRAL URI WITH COUGH: Primary | ICD-10-CM

## 2021-04-20 DIAGNOSIS — J04.0 LARYNGITIS: ICD-10-CM

## 2021-04-20 PROCEDURE — 99213 OFFICE O/P EST LOW 20 MIN: CPT | Mod: 95 | Performed by: FAMILY MEDICINE

## 2021-04-20 NOTE — LETTER
[facility]  [address]  [address]  [facility] [address]  [phone]          April 20, 2021    RE:  Do Hassan                                                                                                                                                       [address]   [facility] [address]            To whom it may concern:    Do Hassan is under my professional care for Viral URI with cough and Laryngitis . She needs vocal rest and to be at home recovering through at least Thursday 4/22. If she has fully recovered she can return to work Friday 4/23 otherwise she will need to be off work until Monday 4/26       Sincerely,        Gorge Leon MD

## 2021-04-20 NOTE — PROGRESS NOTES
Do is a 31 year old who is being evaluated via a billable video visit.      How would you like to obtain your AVS? MyChart  If the video visit is dropped, the invitation should be resent by: Text to cell phone: [phone]   Will anyone else be joining your video visit? No      Video Start Time: 830    Assessment & Plan     Viral URI with cough  Son has same symptoms . 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    Gorge Leon MD  [facility]    Subjective   Do is a 31 year old who presents for the following health issues     HPI     Acute Illness  Acute illness concerns: Sore throat, cough  Onset/Duration: 3 days  Symptoms:  Fever: YES- 99.5  Chills/Sweats: no  Headache (location?): YES- Frontal area and forehead  Sinus Pressure: YES  Conjunctivitis:  no  Ear Pain: no  Rhinorrhea: YES  Congestion: YES  Sore Throat: YES  Cough: YES-productive of clear sputum, productive of yellow sputum  Wheeze: no  Decreased Appetite: YES  Nausea: no  Vomiting: no  Diarrhea: no  Dysuria/Freq.: no  Dysuria or Hematuria: no  Fatigue/Achiness: YES  Sick/Strep Exposure: YES- Son has similar symptoms and was sent home from   lTherapies tried and outcome: Advil and Tyl      Review of Systems   Constitutional, HEENT, cardiovascular, pulmonary, gi and gu systems are negative, except as otherwise noted.      Objective           Vitals:  No vitals were obtained today due to virtual visit.    Physical Exam   GENERAL: alert, mild distress, fatigued and vioce very weak/ hoarse  EYES: Eyes grossly normal to inspection.  No discharge or erythema, or obvious scleral/conjunctival abnormalities.  RESP: No audible wheeze, cough, or visible cyanosis.  No visible retractions or increased  work of breathing.    SKIN: Visible skin clear. No significant rash, abnormal pigmentation or lesions.  NEURO: Cranial nerves grossly intact.  Mentation and speech appropriate for age.  PSYCH: Mentation appears normal, affect normal/bright, judgement and insight intact, normal speech and appearance well-groomed.                Video-Visit Details    Type of service:  Video Visit    Video End Time:8:43 AM    Originating Location (pt. Location): Home    Distant Location (provider location):  [facility]     Platform used for Video Visit: [employer]

## 2021-04-20 NOTE — TELEPHONE ENCOUNTER
POD    See pt MyChart message  She has a GI virtual appointment set got 4/21/21    Mae Traylor RN   [facility]

## 2021-04-21 ENCOUNTER — PRE VISIT (OUTPATIENT)
Dept: GASTROENTEROLOGY | Facility: CLINIC | Age: 31
End: 2021-04-21

## 2021-04-21 ENCOUNTER — MYC MEDICAL ADVICE (OUTPATIENT)
Dept: FAMILY MEDICINE | Facility: CLINIC | Age: 31
End: 2021-04-21

## 2021-04-21 NOTE — TELEPHONE ENCOUNTER
Constipation issues for awhile, but is feeling like she can feel the bowel through her vaginal wall    She does have an appointment with LEVI tomorrow at the [facility], [facility] [address]    Advised if she develops pain or bleeding seek UC/ED care    Mae Traylor RN   [facility]

## 2021-04-21 NOTE — TELEPHONE ENCOUNTER
See encounter on 4/21  No further action needed on this encounter    Mae Traylor RN   [facility]

## 2021-04-22 ENCOUNTER — TRANSFERRED RECORDS (OUTPATIENT)
Dept: HEALTH INFORMATION MANAGEMENT | Facility: CLINIC | Age: 31
End: 2021-04-22

## 2021-05-11 ENCOUNTER — TRANSFERRED RECORDS (OUTPATIENT)
Dept: HEALTH INFORMATION MANAGEMENT | Facility: CLINIC | Age: 31
End: 2021-05-11

## 2021-05-28 ENCOUNTER — VIRTUAL VISIT (OUTPATIENT)
Dept: URGENT CARE | Facility: CLINIC | Age: 31
End: 2021-05-28
Payer: COMMERCIAL

## 2021-05-28 DIAGNOSIS — R19.7 VOMITING AND DIARRHEA: Primary | ICD-10-CM

## 2021-05-28 DIAGNOSIS — R11.10 VOMITING AND DIARRHEA: Primary | ICD-10-CM

## 2021-05-28 PROCEDURE — 99213 OFFICE O/P EST LOW 20 MIN: CPT | Mod: 95 | Performed by: NURSE PRACTITIONER

## 2021-05-28 NOTE — LETTER
May 28, 2021      Do Hassan  [address]   [facility] [address]        To Whom It May Concern:    Do Hassan  was seen on 5/28/2021.  Please excuse her  until 5/31/21 due to illness.        Sincerely,        Nancy Can, CNP

## 2021-05-28 NOTE — PROGRESS NOTES
"Do Hassan is a 31 year old female who is being evaluated via a billable video visit.      Assessment & Plan   No diagnosis found.    The patient has been notified of following:     \"This video visit will be conducted via a call between you and your physician/provider. We have found that certain health care needs can be provided without the need for an in-person physical exam.  This service lets us provide the care you need with a video conversation.  If a prescription is necessary we can send it directly to your pharmacy.     Video visits are billed at different rates depending on your insurance coverage.  Please reach out to your insurance provider with any questions.    If during the course of the call the physician/provider feels a video visit is not appropriate, you will not be charged for this service.\"    Patient has given verbal consent for Video visit? Yes    Subjective   Video Start Time: 10:12 AM    Do Hassan is a 31 year old female who presents to clinic today for the following health issues: diarrhea and vomiting for 2 days. Lightheaded and dizzy, fever and chills.  had same symptoms last week and they have now resolved.     Review of Systems   A 10 point review os systems I negative except as noted in HPI.      Objective    There were no vitals taken for this visit.  GENERAL: alert, fatigued  EYES: Eyes grossly normal to inspection, conjunctivae and sclerae normal  RESP: no audible wheeze, cough, or visible cyanosis.  No visible retractions or increased work of breathing.  Able to speak fully in complete sentences.  NEURO: Cranial nerves grossly intact, mentation intact and speech normal  PSYCH: mentation appears normal, affect normal/bright, judgement and insight intact, normal speech and appearance well-groomed          Video-Visit Details    Type of service:  Video Visit    Video End Time (time video stopped): 1024    Originating Location (pt. Location): Home    Distant Location " (provider location):  [facility]     Mode of Communication:  Video Conference via Bridge

## 2021-05-28 NOTE — PATIENT INSTRUCTIONS
Try Loperamide for the diarrhea. This is available without a prescription from any pharmacy.    Clear liquid diet for today.    If not feeling better tomorrow go in to ER for possible IV fluids.    Patient Education   Coping with Diarrhea  What is diarrhea?  If you have loose, watery bowel movements at least three times a day, you have diarrhea. You may also have gas and stomach cramps.  Emotional upset, infection and bad reactions to food may make diarrhea worse.  How is it treated?  Severe diarrhea can be treated with medicine, such as Imodium and Lomotil. These slow the digestive tract and reduce the amount of fluid lost in bowel movements. Your care team can tell you if medicine is right for you.  What else can I do to treat or prevent diarrhea?    Avoid:  ? Fried, fatty, greasy, spicy or very sweet foods  ? Caffeine and alcohol  ? High-fiber foods such as whole grains, raw vegetables, unpeeled fresh fruits, dried fruits, dried beans and peas, nuts, seeds and popcorn  ? Chewing gum and sugar-free candies (these often contain a sugar alcohol that may increase diarrhea)  ? Gas-forming foods such as broccoli, cauliflower, brussels sprouts and carbonated (fizzy) drinks.    Eat smaller amounts of food, but eat more often. Serve foods cold or at room temperature.    Drink six to eight 8-ounce glasses of fluid each day, such as:  ? Fruit juice, watered-down (half water)  ? Broth or gelatin  ? Popsicles  ? Sports drinks or flat decaf soda pop (leave it open for at least 10 minutes before drinking).    Limit milk products to two low-fat servings daily.    Increase your potassium with watered-down orange juice, sports drinks, potatoes without skin, bananas or tomato products.    Increase your sodium with soups and broths, sports drinks, crackers and pretzels.    Rest and avoid stress.    Weigh yourself daily. Keep a record of your weight and how often you pass loose stools.    Take warm baths to keep yourself clean. Tell  your doctor if your rectum is red, painful or swollen.    Think about buying a skin barrier (such as Aquaphor) at the drug store. This can help protect the skin around your rectum from irritation and skin breakdown. Use it after each bowel movement.    If diarrhea is severe, have only clear liquids (liquids that you can see through) until it stops. Once it stops, slowly return to your normal diet.  When should I call my care team?  Call your care team if:    You follow the steps listed here, but your diarrhea does not improve within 24 hours.    You have more than six loose stools in one day.    You have sudden, severe belly pain.    You have been unable to eat for over two days.    You have fever or dizziness along with diarrhea.    You notice blood in your stool or you have black, tarry stools.  Food group Recommended foods Foods to avoid   Meats, eggs and cheese Broiled or baked lean meat, fish, chicken or turkey (no skin). Eggs, well-cooked. Beans. Chicken or turkey with the skin.   Breads and starches Breads, rolls and pastas made from white flour. Instant white rice, refined cereals (Cream of Wheat, Cream of Rice, Cornflakes, Rice Krispies), pancakes, waffles, muffins, cornbread, christa crackers. Whole grain breads and cereals, bran, Shredded Wheat, wild rice, granola.   Fruits and vegetables Canned, frozen or peeled fresh fruits such as bananas or applesauce. Tomato paste, tomato sauce, tomato puree, cooked vegetables (acorn squash, asparagus, beets, carrots, celery, green beans, mushrooms, baked potato without skin, peeled zucchini). Unpeeled fruits, melons, grapefruit juice, all vegetables not listed on the left.   Milk products Skim or 1% milk, low-fat yogurt, low-fat cheese. Whole or 2% milk, high-fat ice cream, cream.   Drinks Sports drinks, watered-down fruit juices. (No caffeine.) Prune juice, caffeine.   Desserts Cookies, cake, gelatin, sherbet, fruit pies (avoid pies made with unpeeled fruit). Nuts,  coconut, chocolate, licorice.   Other Broth, butter, margarine, mayonnaise, salad dressing, vegetable oil. Hot sauce, pepper, chili powder, taco seasoning.   Comments:  __________________________________________  __________________________________________  __________________________________________  __________________________________________  __________________________________________  __________________________________________  __________________________________________  __________________________________________  __________________________________________  __________________________________________  For informational purposes only. Not to replace the advice of your health care provider. Copyright   2006 [facility]. All rights reserved. Clinically reviewed by [facility]. [employer] 295988 - REV 04/19.       Patient Education   Coping with Nausea and Vomiting  What are nausea and vomiting?  Nausea is feeling queasy or sick to your stomach, as if you're going to throw up. Vomiting is emptying your stomach by throwing up.  How are they treated?  If your symptoms are severe, your care team can give you medicine to control them. You may need to try several drugs before finding the one that works best.    If you know you get sick at a certain time each day or before certain events, take your medicine before you feel sick.    If you do not take anti-nausea medicine at regular times, take it before meals or first thing in the morning, before you get out of bed.    Some patients find alternative therapies help. Ask your health care team for more information.  What else can I do to treat or prevent nausea and vomiting?    Before getting out of bed in the morning, eat dry foods (crackers, toast, dry cereal).    Eat several small meals throughout the day. Choose bland, starchy foods that are easy to digest, like toast, rice, dry cereals, chicken noodle soup and canned fruit.    Eat foods cold or at room  temperature. The smells from hot foods may make nausea worse.    Do not eat favorite foods when you feel sick.    If possible, have somebody else make meals when you feel sick.    Avoid foods that are fatty, fried, very spicy, very sweet, hard to digest (like tough meat) and foods that have a strong odor. Also avoid caffeine and alcohol.    Once vomiting has stopped, try small sips of fluids between meals. Slowly increase your fluids as you are able. Try water, ginger ale, Popsicles, sports drinks, soups, broths or frozen juice chips.    If your body can handle it, drink 6 to 8 glasses of fluids a day (8 ounces each). Drink one hour before and after meals, but not during meals.    Wear loose-fitting clothes when you eat.    Eat in a cool, airy, quiet place. Avoid eating in a room that is warm or has cooking odors.    Rest in a chair for an hour or two after meals. Wait one hour before lying flat.    Distract yourself from nausea with music, TV or visits with friends and family.    A clean mouth will make you feel better, so brush your teeth often. Rinse your mouth before and after meals.    Suck on hard candies (such as lemon drops or mints) if you have a bad taste in your mouth.  When should I call my care team?  Call your care team if:    You vomit more than three times in an hour for over three hours.    You can't keep liquids down for 12 hours or more, or you can't drink more than 4 cups of liquid a day.    You see blood in the vomit, or the vomit looks like coffee grounds.    You choke while vomiting. You can't stop coughing after you vomit.    You can't eat for more than two days.    You can't keep your medicine  down.  Comments:  __________________________________________  __________________________________________  __________________________________________  __________________________________________  __________________________________________  __________________________________________  __________________________________________  __________________________________________  Food group Recommended foods Foods to avoid   Meats, eggs and cheese Broiled or baked meat, fish or poultry. Cold meat or fish salad, lean ham, eggs (not fried), cream soups made with low-fat milk. Sausage, oakley and other fatty and fried meats. Fried eggs.   Breads and starches Saltine crackers, breads, toast, cold cereals, English muffins, bagels, plain noodles, rice. Doughnuts, pastries, waffles, pancakes, muffins.   Fruits and vegetables Juices, canned or fresh fruits, potatoes (baked, boiled or mashed). Other vegetables if your body can handle it. French fries and hash browns. Breaded, fried or creamed vegetables.   Milk products Low-fat milk, non-fat yogurt. High-fat milk shakes, cream, ice cream.   Drinks Soft drinks, iced tea, sports drinks. (No caffeine.) Alcohol, coffee.   Desserts Sherbet, Popsicles, juice bars, fruit ice, gelatin, mali food cake, sponge cake, vanilla wafers, pudding made with low-fat milk. Pies, rich cakes.   Other Butter or margarine in small amounts, low-fat gravy, pretzels. Salt and spices if your body can handle it. Spicy salad dressings, olives, pepper, chili powder, onion, hot sauce, potato chips.   For informational purposes only. Not to replace the advice of your health care provider. Copyright   2006 [employer]. All rights reserved. Clinically reviewed by [employer] [facility]. [employer] 353015 - REV 04/19.

## 2021-06-24 ENCOUNTER — TRANSFERRED RECORDS (OUTPATIENT)
Dept: HEALTH INFORMATION MANAGEMENT | Facility: CLINIC | Age: 31
End: 2021-06-24

## 2021-07-08 ENCOUNTER — MYC MEDICAL ADVICE (OUTPATIENT)
Dept: FAMILY MEDICINE | Facility: CLINIC | Age: 31
End: 2021-07-08

## 2021-07-08 ENCOUNTER — TELEPHONE (OUTPATIENT)
Dept: FAMILY MEDICINE | Facility: CLINIC | Age: 31
End: 2021-07-08

## 2021-07-08 DIAGNOSIS — G90.A POTS (POSTURAL ORTHOSTATIC TACHYCARDIA SYNDROME): Primary | ICD-10-CM

## 2021-07-08 RX ORDER — METOPROLOL TARTRATE 25 MG/1
25 TABLET, FILM COATED ORAL 2 TIMES DAILY
Qty: 60 TABLET | Refills: 0 | Status: SHIPPED | OUTPATIENT
Start: 2021-07-08 | End: 2021-08-04

## 2021-07-08 NOTE — TELEPHONE ENCOUNTER
Response sent to patient via [employer].     Derik Stuart RN   [facility]

## 2021-07-08 NOTE — TELEPHONE ENCOUNTER
I called her   she has been on propanolol 10 mg tid  For POTS but struggles to remember to take it tid   open to try ing a more cardioselective form   will try   Orders Placed This Encounter     metoprolol tartrate (LOPRESSOR) 25 MG tablet     Sig: Take 1 tablet (25 mg) by mouth 2 times daily     Dispense:  60 tablet     Refill:  0     If gets too low of BP will switch back to propanolol

## 2021-07-12 NOTE — TELEPHONE ENCOUNTER
No further action needed request was dealt with on another encounter    Mae Traylor RN   [facility]

## 2021-07-16 NOTE — TELEPHONE ENCOUNTER
DIAGNOSIS: low back scoliosis/ R hip pain/self/BCBS/no image   APPOINTMENT DATE: 7.22.21   NOTES STATUS DETAILS   OFFICE NOTE from referring provider N/A    OFFICE NOTE from other specialist N/A    DISCHARGE SUMMARY from hospital N/A    DISCHARGE REPORT from the ER N/A    OPERATIVE REPORT In process R hip surgery  2008 spinal fusion   EMG report N/A    MEDICATION LIST Internal    MRI In process 4.27.18 R hip, pelvis, [place]   DEXA (osteoporosis/bone health) N/A    CT SCAN Internal 3.14.21 abdomen pelvis   XRAYS (IMAGES & REPORTS) N/A      Action 7.16.21 10:45 AM [facility]   Action Taken Sent request for records to [phone]     Action 7.19.21 MJ 7:54 AM   Action Taken No images have been push from [place]. No medical records in rightfax. Sent request to number listed above to Medical records/HIM department [place] for image and op reports.     Action 7.21.21 MJ 6:52 AM   Action Taken Nothing has been push or received from [place]. Googled fax number to see what the name of the facility is. Cold case. Will have to call pt to get name of facility.     Action 7.21.21 MJ   Action Taken Called pt, no answer. Sent fax again to number listed above.

## 2021-07-22 ENCOUNTER — PRE VISIT (OUTPATIENT)
Dept: ORTHOPEDICS | Facility: CLINIC | Age: 31
End: 2021-07-22

## 2021-08-04 ENCOUNTER — MYC REFILL (OUTPATIENT)
Dept: FAMILY MEDICINE | Facility: CLINIC | Age: 31
End: 2021-08-04

## 2021-08-04 DIAGNOSIS — G90.A POTS (POSTURAL ORTHOSTATIC TACHYCARDIA SYNDROME): ICD-10-CM

## 2021-08-04 RX ORDER — METOPROLOL TARTRATE 25 MG/1
25 TABLET, FILM COATED ORAL 2 TIMES DAILY
Qty: 180 TABLET | Refills: 1 | Status: SHIPPED | OUTPATIENT
Start: 2021-08-04 | End: 2022-01-18

## 2021-08-04 NOTE — TELEPHONE ENCOUNTER
"Requested Prescriptions   Pending Prescriptions Disp Refills     metoprolol tartrate (LOPRESSOR) 25 MG tablet 60 tablet 0     Sig: Take 1 tablet (25 mg) by mouth 2 times daily       Beta-Blockers Protocol Passed - 8/4/2021  3:51 PM        Passed - Blood pressure under 140/90 in past 12 months     BP Readings from Last 3 Encounters:   03/14/21 116/71   03/11/21 108/69   03/05/21 111/60                 Passed - Patient is age 6 or older        Passed - Recent (12 mo) or future (30 days) visit within the authorizing provider's specialty     Patient has had an office visit with the authorizing provider or a provider within the authorizing providers department within the previous 12 mos or has a future within next 30 days. See \"Patient Info\" tab in inbasket, or \"Choose Columns\" in Meds & Orders section of the refill encounter.              Passed - Medication is active on med list           Signed Prescriptions:                        Disp   Refills    metoprolol tartrate (LOPRESSOR) 25 MG tabl*180 ta*1        Sig: Take 1 tablet (25 mg) by mouth 2 times daily  Authorizing Provider: RADHA QUISPE  Ordering User: OZZIE QUINTANILLA      "

## 2021-08-31 ENCOUNTER — HOSPITAL ENCOUNTER (EMERGENCY)
Facility: CLINIC | Age: 31
Discharge: HOME | End: 2021-08-31
Attending: EMERGENCY MEDICINE | Admitting: EMERGENCY MEDICINE
Payer: COMMERCIAL

## 2021-08-31 ENCOUNTER — APPOINTMENT (OUTPATIENT)
Dept: ULTRASOUND IMAGING | Facility: CLINIC | Age: 31
End: 2021-08-31
Attending: EMERGENCY MEDICINE
Payer: COMMERCIAL

## 2021-08-31 VITALS
BODY MASS INDEX: 24.24 KG/M2 | HEART RATE: 90 BPM | DIASTOLIC BLOOD PRESSURE: 69 MMHG | RESPIRATION RATE: 16 BRPM | TEMPERATURE: 98 F | WEIGHT: 142 LBS | SYSTOLIC BLOOD PRESSURE: 116 MMHG | OXYGEN SATURATION: 95 % | HEIGHT: 64 IN

## 2021-08-31 DIAGNOSIS — N83.201 CYST OF RIGHT OVARY: ICD-10-CM

## 2021-08-31 LAB
ALBUMIN SERPL-MCNC: 4 G/DL (ref 3.4–5)
ALBUMIN UR-MCNC: NEGATIVE MG/DL
ALP SERPL-CCNC: 43 U/L (ref 40–150)
ALT SERPL W P-5'-P-CCNC: 20 U/L (ref 0–50)
ANION GAP SERPL CALCULATED.3IONS-SCNC: 4 MMOL/L (ref 3–14)
APPEARANCE UR: CLEAR
AST SERPL W P-5'-P-CCNC: 12 U/L (ref 0–45)
BACTERIA #/AREA URNS HPF: ABNORMAL /HPF
BASOPHILS # BLD AUTO: 0.1 10E3/UL (ref 0–0.2)
BASOPHILS NFR BLD AUTO: 1 %
BILIRUB SERPL-MCNC: 0.5 MG/DL (ref 0.2–1.3)
BILIRUB UR QL STRIP: NEGATIVE
BUN SERPL-MCNC: 8 MG/DL (ref 7–30)
CALCIUM SERPL-MCNC: 8.7 MG/DL (ref 8.5–10.1)
CHLORIDE BLD-SCNC: 106 MMOL/L (ref 94–109)
CO2 SERPL-SCNC: 26 MMOL/L (ref 20–32)
COLOR UR AUTO: ABNORMAL
CREAT SERPL-MCNC: 0.7 MG/DL (ref 0.52–1.04)
EOSINOPHIL # BLD AUTO: 0.2 10E3/UL (ref 0–0.7)
EOSINOPHIL NFR BLD AUTO: 2 %
ERYTHROCYTE [DISTWIDTH] IN BLOOD BY AUTOMATED COUNT: 12.5 % (ref 10–15)
GFR SERPL CREATININE-BSD FRML MDRD: >90 ML/MIN/1.73M2
GLUCOSE BLD-MCNC: 93 MG/DL (ref 70–99)
GLUCOSE UR STRIP-MCNC: NEGATIVE MG/DL
HCG UR QL: NEGATIVE
HCT VFR BLD AUTO: 39.4 % (ref 35–47)
HGB BLD-MCNC: 13.4 G/DL (ref 11.7–15.7)
HGB UR QL STRIP: NEGATIVE
HOLD SPECIMEN: NORMAL
IMM GRANULOCYTES # BLD: 0 10E3/UL
IMM GRANULOCYTES NFR BLD: 0 %
KETONES UR STRIP-MCNC: NEGATIVE MG/DL
LEUKOCYTE ESTERASE UR QL STRIP: NEGATIVE
LYMPHOCYTES # BLD AUTO: 2.9 10E3/UL (ref 0.8–5.3)
LYMPHOCYTES NFR BLD AUTO: 34 %
MCH RBC QN AUTO: 29.8 PG (ref 26.5–33)
MCHC RBC AUTO-ENTMCNC: 34 G/DL (ref 31.5–36.5)
MCV RBC AUTO: 88 FL (ref 78–100)
MONOCYTES # BLD AUTO: 0.5 10E3/UL (ref 0–1.3)
MONOCYTES NFR BLD AUTO: 6 %
MUCOUS THREADS #/AREA URNS LPF: PRESENT /LPF
NEUTROPHILS # BLD AUTO: 4.8 10E3/UL (ref 1.6–8.3)
NEUTROPHILS NFR BLD AUTO: 57 %
NITRATE UR QL: NEGATIVE
NRBC # BLD AUTO: 0 10E3/UL
NRBC BLD AUTO-RTO: 0 /100
PH UR STRIP: 5.5 [PH] (ref 5–7)
PLATELET # BLD AUTO: 300 10E3/UL (ref 150–450)
POTASSIUM BLD-SCNC: 3.7 MMOL/L (ref 3.4–5.3)
PROT SERPL-MCNC: 7.9 G/DL (ref 6.8–8.8)
RBC # BLD AUTO: 4.49 10E6/UL (ref 3.8–5.2)
RBC URINE: <1 /HPF
SODIUM SERPL-SCNC: 136 MMOL/L (ref 133–144)
SP GR UR STRIP: 1.01 (ref 1–1.03)
SQUAMOUS EPITHELIAL: 1 /HPF
TRANSITIONAL EPI: <1 /HPF
UROBILINOGEN UR STRIP-MCNC: NORMAL MG/DL
WBC # BLD AUTO: 8.4 10E3/UL (ref 4–11)
WBC URINE: 1 /HPF

## 2021-08-31 PROCEDURE — 36415 COLL VENOUS BLD VENIPUNCTURE: CPT | Performed by: EMERGENCY MEDICINE

## 2021-08-31 PROCEDURE — 96361 HYDRATE IV INFUSION ADD-ON: CPT | Performed by: EMERGENCY MEDICINE

## 2021-08-31 PROCEDURE — 82040 ASSAY OF SERUM ALBUMIN: CPT | Performed by: EMERGENCY MEDICINE

## 2021-08-31 PROCEDURE — 96374 THER/PROPH/DIAG INJ IV PUSH: CPT | Performed by: EMERGENCY MEDICINE

## 2021-08-31 PROCEDURE — 81025 URINE PREGNANCY TEST: CPT | Performed by: FAMILY MEDICINE

## 2021-08-31 PROCEDURE — 258N000003 HC RX IP 258 OP 636: Performed by: EMERGENCY MEDICINE

## 2021-08-31 PROCEDURE — 81025 URINE PREGNANCY TEST: CPT | Performed by: EMERGENCY MEDICINE

## 2021-08-31 PROCEDURE — 250N000013 HC RX MED GY IP 250 OP 250 PS 637: Performed by: EMERGENCY MEDICINE

## 2021-08-31 PROCEDURE — 250N000011 HC RX IP 250 OP 636: Performed by: EMERGENCY MEDICINE

## 2021-08-31 PROCEDURE — 81001 URINALYSIS AUTO W/SCOPE: CPT | Performed by: EMERGENCY MEDICINE

## 2021-08-31 PROCEDURE — 76856 US EXAM PELVIC COMPLETE: CPT

## 2021-08-31 PROCEDURE — 99285 EMERGENCY DEPT VISIT HI MDM: CPT | Mod: 25 | Performed by: EMERGENCY MEDICINE

## 2021-08-31 PROCEDURE — 76856 US EXAM PELVIC COMPLETE: CPT | Mod: 26

## 2021-08-31 PROCEDURE — 81001 URINALYSIS AUTO W/SCOPE: CPT | Performed by: FAMILY MEDICINE

## 2021-08-31 PROCEDURE — 99285 EMERGENCY DEPT VISIT HI MDM: CPT | Performed by: EMERGENCY MEDICINE

## 2021-08-31 PROCEDURE — 85025 COMPLETE CBC W/AUTO DIFF WBC: CPT | Performed by: EMERGENCY MEDICINE

## 2021-08-31 RX ORDER — HYDROCODONE BITARTRATE AND ACETAMINOPHEN 5; 325 MG/1; MG/1
1 TABLET ORAL EVERY 6 HOURS PRN
Qty: 12 TABLET | Refills: 0 | Status: SHIPPED | OUTPATIENT
Start: 2021-08-31 | End: 2022-01-18

## 2021-08-31 RX ORDER — SODIUM CHLORIDE 9 MG/ML
INJECTION, SOLUTION INTRAVENOUS CONTINUOUS
Status: DISCONTINUED | OUTPATIENT
Start: 2021-08-31 | End: 2021-09-01 | Stop reason: HOSPADM

## 2021-08-31 RX ORDER — HYDROCODONE BITARTRATE AND ACETAMINOPHEN 5; 325 MG/1; MG/1
1 TABLET ORAL ONCE
Status: COMPLETED | OUTPATIENT
Start: 2021-08-31 | End: 2021-08-31

## 2021-08-31 RX ORDER — HYDROMORPHONE HYDROCHLORIDE 1 MG/ML
0.5 INJECTION, SOLUTION INTRAMUSCULAR; INTRAVENOUS; SUBCUTANEOUS
Status: COMPLETED | OUTPATIENT
Start: 2021-08-31 | End: 2021-08-31

## 2021-08-31 RX ADMIN — SODIUM CHLORIDE 1000 ML: 9 INJECTION, SOLUTION INTRAVENOUS at 20:26

## 2021-08-31 RX ADMIN — HYDROCODONE BITARTRATE AND ACETAMINOPHEN 1 TABLET: 5; 325 TABLET ORAL at 20:25

## 2021-08-31 RX ADMIN — HYDROMORPHONE HYDROCHLORIDE 0.5 MG: 1 INJECTION, SOLUTION INTRAMUSCULAR; INTRAVENOUS; SUBCUTANEOUS at 21:17

## 2021-08-31 ASSESSMENT — MIFFLIN-ST. JEOR: SCORE: 1344.11

## 2021-08-31 NOTE — ED TRIAGE NOTES
31YR F - presenting to ED for eval of abdominal pain-bloating, back pain, N/V-Dry heaves; concern for ovarian cyst.

## 2021-08-31 NOTE — LETTER
August 31, 2021      To Whom It May Concern:      Do Hassan was seen in our Emergency Department today, 08/31/21.  I expect her condition to improve over the next 2 days.  She may return to work when improved.    Sincerely,        Joseluis Bradford, DO

## 2021-09-01 ENCOUNTER — OFFICE VISIT (OUTPATIENT)
Dept: MIDWIFE SERVICES | Facility: CLINIC | Age: 31
End: 2021-09-01
Attending: ADVANCED PRACTICE MIDWIFE
Payer: COMMERCIAL

## 2021-09-01 VITALS
DIASTOLIC BLOOD PRESSURE: 57 MMHG | BODY MASS INDEX: 24.24 KG/M2 | HEART RATE: 73 BPM | SYSTOLIC BLOOD PRESSURE: 95 MMHG | WEIGHT: 142 LBS | HEIGHT: 64 IN

## 2021-09-01 DIAGNOSIS — R10.2 PELVIC PAIN: Primary | ICD-10-CM

## 2021-09-01 PROCEDURE — 99213 OFFICE O/P EST LOW 20 MIN: CPT | Performed by: ADVANCED PRACTICE MIDWIFE

## 2021-09-01 ASSESSMENT — MIFFLIN-ST. JEOR: SCORE: 1344.11

## 2021-09-01 NOTE — ED PROVIDER NOTES
"ED Provider Note  [facility]      History     Chief Complaint   Patient presents with     Abdominal Pain     bloated     Back Pain     Nausea & Vomiting     dry heaves     HPI  Do Hassan is a 31 year old female who presents with lower abdominal pain.  She has a history of ovarian cyst and states this feels somewhat similar.  Is in her central lower abdomen and radiates to her back.  It is intermittent in nature.  She also notes nausea with this.  She is scheduled to see OB/GYN tomorrow.  No other symptoms noted.          Review of Systems  A complete review of systems was performed with pertinent positives and negatives noted in the HPI, and all other systems negative.    Physical Exam   BP: 120/65  Pulse: 67  Temp: 98  F (36.7  C)  Resp: 16  Height: 162.6 cm (5' 4\")  Weight: 64.4 kg (142 lb)  SpO2: 98 %  Physical Exam  Vitals and nursing note reviewed.   Constitutional:       General: She is not in acute distress.     Appearance: She is well-developed. She is not diaphoretic.   HENT:      Head: Normocephalic and atraumatic.      Mouth/Throat:      Pharynx: No oropharyngeal exudate.   Eyes:      General: No scleral icterus.        Right eye: No discharge.         Left eye: No discharge.      Pupils: Pupils are equal, round, and reactive to light.   Cardiovascular:      Rate and Rhythm: Normal rate and regular rhythm.      Heart sounds: Normal heart sounds. No murmur heard.   No friction rub. No gallop.    Pulmonary:      Effort: Pulmonary effort is normal. No respiratory distress.      Breath sounds: Normal breath sounds. No wheezing.   Chest:      Chest wall: No tenderness.   Abdominal:      General: Bowel sounds are normal. There is no distension.      Palpations: Abdomen is soft.      Tenderness: There is abdominal tenderness in the suprapubic area.   Musculoskeletal:         General: No tenderness or deformity. Normal range of motion.      Cervical back: Normal range of motion " and neck supple.   Skin:     General: Skin is warm and dry.      Coloration: Skin is not pale.      Findings: No erythema or rash.   Neurological:      Mental Status: She is alert and oriented to person, place, and time.      Cranial Nerves: No cranial nerve deficit.         ED Course      Procedures              Results for orders placed or performed during the hospital encounter of 08/31/21   US Pelvis Cmpl wo Transvaginal w Abd/Pel Duplex Lmt     Status: None    Narrative    US PELVIS CMPL WO TRANSVAGINAL W ABD/PEL DUPLEX LIMITED   8/31/2021  9:18 PM      HISTORY: Pelvic pain. History of ovarian cyst.    COMPARISON: Ultrasound 3/14/2021    FINDINGS:   Transabdominal and transvaginal imaging. The uterus measures 8.4 x 4.0  x 5.9 cm. The endometrial stripe measures 9 mm. There is no  intrauterine mass. There is trace free fluid in the pelvis.  Intrauterine device appropriately positioned within the endometrial  canal.    The right ovary measures 3.6 x 2.4 x 2.2 cm. The left ovary measures  2.8 x 1.1 x 1.9 cm. There is no adnexal mass. There is normal blood  flow to the ovaries. There is a 2.0 x 2.1 x 1.3 cm simple anechoic  cyst in the right ovary.      Impression    IMPRESSION:   1. 2.1 cm simple right ovarian cyst.  2. Intrauterine device is positioned within the endometrial canal.  3. Trace free fluid within the pelvis.    I have personally reviewed the examination and initial interpretation  and I agree with the findings.    MANUEL KIM MD         SYSTEM ID:  [id number]   UA with Microscopic reflex to Culture     Status: Abnormal    Specimen: Urine, Midstream   Result Value Ref Range    Color Urine Light Yellow Colorless, Straw, Light Yellow, Yellow    Appearance Urine Clear Clear    Glucose Urine Negative Negative mg/dL    Bilirubin Urine Negative Negative    Ketones Urine Negative Negative mg/dL    Specific Gravity Urine 1.015 1.003 - 1.035    Blood Urine Negative Negative    pH Urine 5.5 5.0 - 7.0     Protein Albumin Urine Negative Negative mg/dL    Urobilinogen Urine Normal Normal, 2.0 mg/dL    Nitrite Urine Negative Negative    Leukocyte Esterase Urine Negative Negative    Bacteria Urine Few (A) None Seen /HPF    Mucus Urine Present (A) None Seen /LPF    RBC Urine <1 <=2 /HPF    WBC Urine 1 <=5 /HPF    Squamous Epithelials Urine 1 <=1 /HPF    Transitional Epithelials Urine <1 <=1 /HPF    Narrative    Urine Culture not indicated   HCG qualitative urine     Status: Normal   Result Value Ref Range    hCG Urine Qualitative Negative Negative   Extra Blue Top Tube     Status: None   Result Value Ref Range    Hold Specimen JIC    Extra Green Top (Lithium Heparin) Tube     Status: None   Result Value Ref Range    Hold Specimen JIC    Extra Purple Top Tube     Status: None   Result Value Ref Range    Hold Specimen JIC    CBC with platelets differential     Status: None    Narrative    The following orders were created for panel order CBC with platelets differential.  Procedure                               Abnormality         Status                     ---------                               -----------         ------                     CBC with platelets and d...[[id number]]                      Final result                 Please view results for these tests on the individual orders.   Comprehensive metabolic panel     Status: Normal   Result Value Ref Range    Sodium 136 133 - 144 mmol/L    Potassium 3.7 3.4 - 5.3 mmol/L    Chloride 106 94 - 109 mmol/L    Carbon Dioxide (CO2) 26 20 - 32 mmol/L    Anion Gap 4 3 - 14 mmol/L    Urea Nitrogen 8 7 - 30 mg/dL    Creatinine 0.70 0.52 - 1.04 mg/dL    Calcium 8.7 8.5 - 10.1 mg/dL    Glucose 93 70 - 99 mg/dL    Alkaline Phosphatase 43 40 - 150 U/L    AST 12 0 - 45 U/L    ALT 20 0 - 50 U/L    Protein Total 7.9 6.8 - 8.8 g/dL    Albumin 4.0 3.4 - 5.0 g/dL    Bilirubin Total 0.5 0.2 - 1.3 mg/dL    GFR Estimate >90 >60 mL/min/1.73m2   CBC with platelets and differential      Status: None   Result Value Ref Range    WBC Count 8.4 4.0 - 11.0 10e3/uL    RBC Count 4.49 3.80 - 5.20 10e6/uL    Hemoglobin 13.4 11.7 - 15.7 g/dL    Hematocrit 39.4 35.0 - 47.0 %    MCV 88 78 - 100 fL    MCH 29.8 26.5 - 33.0 pg    MCHC 34.0 31.5 - 36.5 g/dL    RDW 12.5 10.0 - 15.0 %    Platelet Count 300 150 - 450 10e3/uL    % Neutrophils 57 %    % Lymphocytes 34 %    % Monocytes 6 %    % Eosinophils 2 %    % Basophils 1 %    % Immature Granulocytes 0 %    NRBCs per 100 WBC 0 <1 /100    Absolute Neutrophils 4.8 1.6 - 8.3 10e3/uL    Absolute Lymphocytes 2.9 0.8 - 5.3 10e3/uL    Absolute Monocytes 0.5 0.0 - 1.3 10e3/uL    Absolute Eosinophils 0.2 0.0 - 0.7 10e3/uL    Absolute Basophils 0.1 0.0 - 0.2 10e3/uL    Absolute Immature Granulocytes 0.0 <=0.0 10e3/uL    Absolute NRBCs 0.0 10e3/uL   [facility] Draw     Status: None    Narrative    The following orders were created for panel order [facility] Draw.  Procedure                               Abnormality         Status                     ---------                               -----------         ------                     Extra Blue Top Tube[[id number]]                              Final result               Extra Green Top (Lithium...[[id number]]                      Final result               Extra Purple Top Tube[[id number]]                            Final result                 Please view results for these tests on the individual orders.     Medications   0.9% sodium chloride BOLUS (0 mLs Intravenous Stopped 8/31/21 2223)   HYDROcodone-acetaminophen (NORCO) 5-325 MG per tablet 1 tablet (1 tablet Oral Given 8/31/21 2025)   HYDROmorphone (PF) (DILAUDID) injection 0.5 mg (0.5 mg Intravenous Given 8/31/21 2117)        Assessments & Plan (with Medical Decision Making)   This is a 31-year-old female who presents with abdominal pain.  This radiates to her back.  She has had similar symptoms in the past with ovarian cyst.  Symptoms are intermittent in nature.  She also has  nausea.  On exam she has lower abdominal tenderness.  Lab work shows no acute abnormalities.  Ultrasound shows 2.1 cm ovarian cyst.  There is no signs of torsion.  I discussed all results with patient.  Patient was given hydromorphone with improvement in symptoms.  I discussed all results with patient.  Recommended continuing with scheduled follow-up tomorrow.  We will discharge patient on a course of pain medication.  Patient will be discharged with return precautions.    I have reviewed the nursing notes. I have reviewed the findings, diagnosis, plan and need for follow up with the patient.    Discharge Medication List as of 8/31/2021 10:42 PM      START taking these medications    Details   HYDROcodone-acetaminophen (NORCO) 5-325 MG tablet Take 1 tablet by mouth every 6 hours as needed for pain, Disp-12 tablet, R-0, Local Print             Final diagnoses:   Cyst of right ovary       --  Joseluis Bradford DO  [facility] EMERGENCY DEPARTMENT  8/31/2021     Joseluis Bradford,   09/01/21 0241

## 2021-09-01 NOTE — DISCHARGE INSTRUCTIONS
Continue with scheduled follow-up tomorrow. Return to the emergency department if symptoms continue, get worse, there are any new symptoms or any cause for concern.

## 2021-09-02 NOTE — PROGRESS NOTES
"S:  HPI:  Do is here today after evaluation in ED on 8/31 for pelvic pain. She feels pain in the middle of her low pelvis that radiates towards her back. She first started feeling pain on 8/20 during menstrual cycle. Pain was so intense that she used Norco. She got in touch with CNM who ordered TVUS. Before she could schedule US, pain intensified and she presented to ED. TVUS at ED shows right ovarian simple cyst measuring 2cm. No evidence of torsion. IUD in place. Trace free fluid in pelvis.    Since being discharged from ED, Do continues to have ongoing pain that comes and goes. She is taking ibuprofen to help manage.    Do has a history of endometriosis treated with excision in 2017 and a history of ovarian cysts.    Past Medical History:   Diagnosis Date     Anxiety      Arthritis      Depression      Óscar-Danlos syndrome      Endometriosis      Endometriosis 02/2017    evasion of endometriosis     Heartburn      Intussusception (H) 1996     Osteoporosis      POTS (postural orthostatic tachycardia syndrome)      Spina bifida occulta      Substance abuse (H)        O:  BP 95/57   Pulse 73   Ht 1.626 m (5' 4\")   Wt 64.4 kg (142 lb)   BMI 24.37 kg/m    Pelvic Exam:  Vulva: No external lesions, normal hair distribution, no adenopathy  Vagina: Moist, pink, no abnormal discharge, well rugated, no lesions  Cervix:No CMT  Uterus: Normal size, anteverted, mildly tender, mobile  Ovaries: right ovary is tender on palpation, mobile; left ovary nontender, mobile  Rectal exam: deferred    A/P:  (R10.2) Pelvic pain  (primary encounter diagnosis)  Plan:  Discussed option for STI testing but she declines. Plan to follow up with OBGYN for further assessment and plan. Discussed when she should present to ED, if signs of fever, worsening pain, nausea/vomiting.      VERENA Parrish CNM            "

## 2021-09-08 NOTE — ED ADULT TRIAGE NOTE - SOURCE OF INFORMATION
[facility]         HOSPITALIST  DISCHARGE SUMMARY    Patient Name: Isha Llanes  : 1965  MRN: [id number]    Date of Admission: 2021  Date of Discharge:  2021  Primary Care Physician: Damari Cornejo PA-C    Consults     Date and Time Order Name Status Description    2021  6:36 PM Hospitalist (on-call MD unless specified) Completed           Active and Resolved Hospital Problems:  Active Hospital Problems    Diagnosis POA   • Sepsis (CMS/[facility]) [A41.9] Yes      Resolved Hospital Problems   No resolved problems to display.       Hospital Course     Hospital Course:  Isha Llanes is a 55 y.o. female with history of venous insufficiency of lower extremity, type 2 diabetes, back pain with intrathecal pump, COPD, chronic pain and nicotine dependence who presented to the ED with high fever, dysuria and pain / swelling of lower extremities.  Patient states her skin changes to her lower extremities are chronic issue, however over the past couple of weeks it has been slowly worsening.  At home she had been noting elevated temperatures.  In the emergency department patient was febrile to 102.4, tachycardic 120s.  Patient was admitted to the hospital for treatment of bilateral lower extremities with cellulitis, urinary tract infection.  Patient was initially started with broad-spectrum IV antibiotics.  However on 21 patient requested discharge home.  Patient will be transitioned over to Levaquin based on urinary sensitivities for E. coli.  This should also cover patient's chronic leg wounds.  Patient has an order to follow-up with outpatient wound care clinic.  Patient will need chronic care for her bilateral lower extremities.  Patient seen on date of discharge, clinically and hemodynamically stable.  Patient provided concerning signs and symptoms prompting immediate medical attention, patient understanding and agreeable       DISCHARGE Follow Up Recommendations for labs  and diagnostics:   Follow-up with PCP in less than 1 week  Follow-up with outpatient wound care clinic      Day of Discharge     Vital Signs:  Temp:  [97.9 °F (36.6 °C)-99 °F (37.2 °C)] 98.2 °F (36.8 °C)  Heart Rate:  [87-95] 91  Resp:  [17-18] 18  BP: (101-140)/(56-90) 105/62  Flow (L/min):  [2] 2  Physical Exam:               Constitutional: Awake, alert, anxious              Eyes: Pupils equal, sclerae anicteric, no conjunctival injection              HENT: NCAT, mucous membranes moist              Neck: Supple, no thyromegaly, no lymphadenopathy, trachea midline              Respiratory: Clear to auscultation bilaterally, nonlabored respirations               Cardiovascular:RRR, no murmurs, rubs, or gallops, palpable pedal pulses bilaterally              Gastrointestinal: Positive bowel sounds, soft, nontender, nondistended              Musculoskeletal: No bilateral ankle edema, no clubbing or cyanosis to extremities              Neurologic: Oriented x 3, strength symmetric in all extremities, Cranial Nerves grossly intact to confrontation, speech clear              Skin: Bilateral lower extremities erythematous, trace edema.  Several ulcerations, no fluctuance palpated      Discharge Details        Discharge Medications      New Medications      Instructions Start Date   levoFLOXacin 500 MG tablet  Commonly known as: Levaquin   500 mg, Oral, Daily         Changes to Medications      Instructions Start Date   Flovent  MCG/ACT inhaler  Generic drug: fluticasone  What changed:   how much to take  when to take this   inhale 2 puffs by mouth twice daily      triamcinolone 0.1 % ointment  Commonly known as: KENALOG  What changed: how much to take   Topical, 2 Times Daily, For one week for venous stasis dermatitis. Do not apply to open wounds         Continue These Medications      Instructions Start Date   ACE Bandage Self-Adhering misc   1 each, Does not apply, Daily      acetaminophen 500 MG tablet  Commonly  known as: TYLENOL   1,000-1,500 mg, Oral, Every 6 Hours PRN      albuterol sulfate  (90 Base) MCG/ACT inhaler  Commonly known as: PROVENTIL HFA;VENTOLIN HFA;PROAIR HFA   2 puffs, Inhalation, Every 4 Hours PRN      albuterol sulfate  (90 Base) MCG/ACT inhaler  Commonly known as: PROVENTIL HFA;VENTOLIN HFA;PROAIR HFA   1 puff, Inhalation, Every 4 Hours PRN      furosemide 40 MG tablet  Commonly known as: LASIX   40 mg, Oral, Daily      gabapentin 300 MG capsule  Commonly known as: NEURONTIN   TAKE ONE CAPSULE BY MOUTH THREE TIMES DAILY      HYDROmorphone 1 mg/mL solution  Commonly known as: DILAUDID   Intravenous, Titrated, PER PAIN PUMP      hydrOXYzine 50 MG tablet  Commonly known as: ATARAX   50 mg, Oral, 4 Times Daily      Insulin Lispro 100 UNIT/ML injection pen  Commonly known as: ADMELOG SOLOSTAR   12 Units, Subcutaneous, Daily, SLIDING SCALE R/T BS. Takes 2-12 units tid      ipratropium-albuterol 0.5-2.5 mg/3 ml nebulizer  Commonly known as: DUO-NEB   1 ampule, Inhalation      ondansetron 4 MG tablet  Commonly known as: Zofran   4 mg, Oral, Every 8 Hours PRN      Plavix 75 MG tablet  Generic drug: clopidogrel   75 mg, Oral, Daily      sodium chloride 0.9 % solution with HYDROmorphone (PF) 50 MG/5ML solution 0.2 mg/mL   0-2 mg, Intravenous, Every 1 Hour PRN      Sutab 1494-257-425 MG tablet  Generic drug: Sodium Sulfate-Mag Sulfate-KCl   188 mg, Oral, Continuous             Allergies   Allergen Reactions   • Amoxicillin Shortness Of Breath   • Ceclor [Cefaclor] Shortness Of Breath   • Penicillins Shortness Of Breath   • Sulfa Antibiotics Rash   • Valium [Diazepam] Mental Status Change   • Ambien [Zolpidem] Unknown - Low Severity   • Aspirin GI Intolerance   • Ativan [Lorazepam] Unknown - Low Severity   • Benadryl [Diphenhydramine] Unknown - Low Severity   • Biaxin [Clarithromycin] Unknown - Low Severity   • Cefazolin Unknown - Low Severity   • Cephalexin Unknown - Low Severity   • Cephalosporins  Unknown - Low Severity   • Clindamycin Unknown - Low Severity   • Compazine [Prochlorperazine Edisylate] Rash   • Contrast Dye Other (See Comments)     Caused pain in her arm   • Doxycycline GI Intolerance   • Eggs Or Egg-Derived Products Unknown - Low Severity   • Nsaids Unknown - Low Severity   • Phenergan [Promethazine Hcl] Rash   • Promethazine Unknown - Low Severity   • Vancomycin Itching       Discharge Disposition:  Home or Self Care    Diet:  Hospital:  Diet Order   Procedures   • Diet Regular; Consistent Carbohydrate       Discharge Activity:       CODE STATUS:  Code Status and Medical Interventions:   Ordered at: 09/06/21 1916     Level Of Support Discussed With:    Patient     Code Status:    CPR     Medical Interventions (Level of Support Prior to Arrest):    Full         Future Appointments   Date Time Provider Department Center   9/23/2021  9:30 AM Yoseph Jacobs MD [facility]       Additional Instructions for the Follow-ups that You Need to Schedule     Ambulatory Referral to Wound Ostomy for  Eval and Treat (Go ONS)   As directed            Pertinent  and/or Most Recent Results     PROCEDURES:       LAB RESULTS:      Lab 09/08/21  0631 09/07/21  1554 09/06/21  1749   WBC 4.66 5.22 7.17   HEMOGLOBIN 11.4* 11.9* 13.6   HEMATOCRIT 36.8 38.7 44.0   PLATELETS 123* 138* 162   NEUTROS ABS  --   --  6.01   IMMATURE GRANS (ABS)  --   --  0.02   LYMPHS ABS  --   --  0.71   MONOS ABS  --   --  0.36   EOS ABS  --   --  0.05   MCV 84.2 83.9 83.2   SED RATE  --   --  41*   CRP  --   --  4.58*   LACTATE  --   --  2.0         Lab 09/08/21  0631 09/07/21  1049 09/06/21  1749   SODIUM 136 138 138   POTASSIUM 3.8 3.6 4.1   CHLORIDE 102 101 97*   CO2 27.3 27.3 27.8   ANION GAP 6.7 9.7 13.2   BUN 11 9 11   CREATININE 0.49* 0.57 0.63   GLUCOSE 137* 134* 117*   CALCIUM 8.0* 8.6 9.7   MAGNESIUM 1.8  --   --          Lab 09/06/21  1749   TOTAL PROTEIN 8.5   ALBUMIN 4.40   GLOBULIN 4.1   ALT (SGPT) 10   AST (SGOT)  16   BILIRUBIN 0.4   ALK PHOS 94         Lab 09/06/21  1749   PROBNP 193.5   TROPONIN T <0.010                 Brief Urine Lab Results  (Last result in the past 365 days)      Color   Clarity   Blood   Leuk Est   Nitrite   Protein   CREAT   Urine HCG        09/06/21 1734 Dark Yellow Turbid Moderate (2+) Large (3+) Negative 30 mg/dL (1+)             Microbiology Results (last 10 days)     Procedure Component Value - Date/Time    Blood Culture - Blood, Arm, Left [[id number]] Collected: 09/06/21 1858    Lab Status: Preliminary result Specimen: Blood from Arm, Left Updated: 09/07/21 1915     Blood Culture No growth at 24 hours    Blood Culture - Blood, Arm, Left [[id number]] Collected: 09/06/21 1749    Lab Status: Preliminary result Specimen: Blood from Arm, Left Updated: 09/07/21 1801     Blood Culture No growth at 24 hours    COVID-19, [facility] IN-HOUSE, NP SWAB IN TRANSPORT MEDIA 8-10 HR TAT - Swab, Nasopharynx [[id number]]  (Normal) Collected: 09/06/21 1739    Lab Status: Final result Specimen: Swab from Nasopharynx Updated: 09/07/21 1235     COVID19 Not Detected    Narrative:      Testing performed by Real Time RT-PCR  This test has not been approved by the USDA but is authorized under the Emergency Use Act (EUA)    Fact sheet for providers: https://www.fda.gov/media/348143/download    Fact sheet for patients: https://www.fda.gov/media/357222/download      Testing performed by Real Time RT-PCR  This test has not been approved by the USDA but is authorized under the Emergency Use Act (EUA)    Fact sheet for providers: https://www.fda.gov/media/780994/download    Fact sheet for patients: https://www.fda.gov/media/734814/download        Urine Culture - Urine, Urine, Clean Catch [[id number]]  (Abnormal)  (Susceptibility) Collected: 09/06/21 1734    Lab Status: Final result Specimen: Urine, Clean Catch Updated: 09/08/21 1030     Urine Culture 25,000 CFU/mL Escherichia coli    Susceptibility      Escherichia coli      MARTHA       Ampicillin Resistant      Ampicillin + Sulbactam Intermediate      Cefazolin Susceptible      Cefepime Susceptible      Ceftazidime Susceptible      Ceftriaxone Susceptible      Gentamicin Susceptible      Levofloxacin Susceptible      Nitrofurantoin Susceptible      Piperacillin + Tazobactam Susceptible      Tetracycline Resistant      Trimethoprim + Sulfamethoxazole Susceptible               Linear View                              Results for orders placed in visit on 08/06/18    SCANNED VASCULAR STUDIES      Results for orders placed in visit on 08/06/18    SCANNED VASCULAR STUDIES      Results for orders placed during the hospital encounter of 03/06/19    Adult Transthoracic Echo Limited W/ Cont if Necessary Per Protocol    Interpretation Summary  · Estimated EF appears to be in the range of 61 - 65%  · Left ventricular wall thickness is consistent with mild concentric hypertrophy.  · Normal right ventricular cavity size and systolic function noted.  · There is moderate to severe aortic stenosis (peak gradient 51 mmHg, mean gradient 30 mmHg, aortic valve area calculated at 0.68 cm²)..  · There is severe nodular calcification of the posterior mitral valve annulus with extension  · There is no evidence of pericardial effusion.      Labs Pending at Discharge:  Pending Labs     Order Current Status    Blood Culture - Blood, Arm, Left Preliminary result    Blood Culture - Blood, Arm, Left Preliminary result            Time spent on Discharge including face to face service:  35 minutes    Electronically signed by Hardeep Rose MD, 09/08/21, 10:56 AM EDT.     Patient

## 2021-10-11 ENCOUNTER — HEALTH MAINTENANCE LETTER (OUTPATIENT)
Age: 31
End: 2021-10-11

## 2021-11-01 NOTE — PRE-OP CHECKLIST - IDENTIFICATION BAND VERIFIED
[Other: _____] : [unfilled] [de-identified] : This visit was provided via TELEPHONE. The patient, SHAREE SERNA , was located at home,[address]\[place] [address] , at the time of the visit. \par The provider,SARA NAGEL , was located at his medical office located in  at the time of the visit. The patient, and Provider participated in the TELEPHONE\par Verbal consent given on Nov 1 2021  5:30PM by the patient.\par \par \par \par discussed with the wife who is the main care giver \par \par \par \par \par 89 y.o M w/PMhx of HTN, HLD, Hyponatremia and Afib on (Coumadin), colon CA ( 2001 s/p Chemo), prostate CA( 2006 Tx with radiation), CKD,  MR with Aborted Mitraclip x2 (on 11/21/2018 and 2/2020) with residual severe MR, HFrEF with EF 30% -35% on TTE in 09/2019, chronic GERD for follow up; \par \par history obtained by pt and his wife who is the main care giver\par pt is at his baseline; no complaints today \par \par FAST score at 6; PPS score =50; need help with all his ADLs and IADLs. pt is current followed by palliative team from his insurance and has visiting RN visit; RN visit every week and per wife; his vital is stable; \par \par pt recently saw cardio in 07/2020 and per wife, no further aggressive measurement for now; and will follow up with cardio in 3 months in 10/2020\par \par has not follow up with Hematologist yet\par \par \par interval Hx 10/14/2020: \par \par \par spoke with pt and his wife \par pt had a fall in last Sunday; pt stated that he fell on his head; frequent fall; denies of LOC; no focal deficit; no blurry vision; cognitive at base line per wife; has been check from hospice care team RN who visit pt once a week; also had evaluation by hospice care physician remotely;  \par \par now use commode\par \par has not follow up with cardio yet\par \par \par interval hx 11/01/2021\par \par now doing well at baseline \par \par  done

## 2021-11-09 ENCOUNTER — LAB (OUTPATIENT)
Dept: URGENT CARE | Facility: URGENT CARE | Age: 31
End: 2021-11-09
Attending: FAMILY MEDICINE
Payer: COMMERCIAL

## 2021-11-09 DIAGNOSIS — Z20.822 SUSPECTED COVID-19 VIRUS INFECTION: ICD-10-CM

## 2021-11-09 LAB — SARS-COV-2 RNA RESP QL NAA+PROBE: NEGATIVE

## 2021-11-09 PROCEDURE — U0005 INFEC AGEN DETEC AMPLI PROBE: HCPCS

## 2021-11-09 PROCEDURE — U0003 INFECTIOUS AGENT DETECTION BY NUCLEIC ACID (DNA OR RNA); SEVERE ACUTE RESPIRATORY SYNDROME CORONAVIRUS 2 (SARS-COV-2) (CORONAVIRUS DISEASE [COVID-19]), AMPLIFIED PROBE TECHNIQUE, MAKING USE OF HIGH THROUGHPUT TECHNOLOGIES AS DESCRIBED BY CMS-2020-01-R: HCPCS

## 2021-11-13 ENCOUNTER — APPOINTMENT (OUTPATIENT)
Dept: URGENT CARE | Facility: CLINIC | Age: 31
End: 2021-11-13
Payer: COMMERCIAL

## 2021-11-16 ENCOUNTER — OFFICE VISIT (OUTPATIENT)
Dept: FAMILY MEDICINE | Facility: CLINIC | Age: 31
End: 2021-11-16
Payer: COMMERCIAL

## 2021-11-16 VITALS
SYSTOLIC BLOOD PRESSURE: 108 MMHG | WEIGHT: 149 LBS | BODY MASS INDEX: 25.58 KG/M2 | TEMPERATURE: 97.6 F | DIASTOLIC BLOOD PRESSURE: 70 MMHG | HEART RATE: 64 BPM | OXYGEN SATURATION: 97 %

## 2021-11-16 DIAGNOSIS — J20.9 ACUTE BRONCHITIS WITH SYMPTOMS > 10 DAYS: Primary | ICD-10-CM

## 2021-11-16 PROBLEM — N80.9 ENDOMETRIOSIS: Status: ACTIVE | Noted: 2017-02-11

## 2021-11-16 PROCEDURE — 99213 OFFICE O/P EST LOW 20 MIN: CPT | Performed by: FAMILY MEDICINE

## 2021-11-16 RX ORDER — AZITHROMYCIN 250 MG/1
TABLET, FILM COATED ORAL
Qty: 6 TABLET | Refills: 0 | Status: SHIPPED | OUTPATIENT
Start: 2021-11-16 | End: 2021-11-21

## 2021-11-16 RX ORDER — PREDNISONE 20 MG/1
20 TABLET ORAL 2 TIMES DAILY
Qty: 10 TABLET | Refills: 0 | Status: SHIPPED | OUTPATIENT
Start: 2021-11-16 | End: 2021-11-21

## 2021-11-16 NOTE — PROGRESS NOTES
Assessment & Plan     Acute bronchitis with symptoms > 10 days  Clinically her symptoms are consistent with an acute bronchitis given duration and symptoms. I have suggested antibiotic therapy with a Z-Gavin as well as a short course of prednisone. Indications for follow-up were reviewed and other symptomatic cares around cough suppression were discussed briefly.  - azithromycin (ZITHROMAX) 250 MG tablet; Take 2 tablets (500 mg) by mouth daily for 1 day, THEN 1 tablet (250 mg) daily for 4 days.  - predniSONE (DELTASONE) 20 MG tablet; Take 1 tablet (20 mg) by mouth 2 times daily for 5 days                 No follow-ups on file.    Kendell Christianson MD  [facility] REHANA Lei is a 31 year old who presents for the following health issues     HPI     Acute Illness  Acute illness concerns: SOB and cough  Onset/Duration: few weeks- negative for COVID   Symptoms:  Fever: no  Chills/Sweats: YES  Headache (location?): YES  Sinus Pressure: YES  Conjunctivitis:  no  Ear Pain: no  Rhinorrhea: no  Congestion: YES  Sore Throat: YES  Cough: YES-productive of yellow sputum  Wheeze: YES  Decreased Appetite: no  Nausea: no  Vomiting: no  Diarrhea: no  Dysuria/Freq.: no  Dysuria or Hematuria: no  Fatigue/Achiness: YES  Sick/Strep Exposure: None   Therapies tried and outcome: Dayquil, nightquil, unisom     Patient has been sick now for about 3 weeks with onset of cough, shortness of breath, upper respiratory symptoms which have improved but persistent pressure and pain and productive cough in her chest. Cough is turned to dark yellow. Has had Covid testing as recently as 7 days ago and it was negative.    Review of Systems   Constitutional, HEENT, cardiovascular, pulmonary, gi and gu systems are negative, except as otherwise noted.      Objective    /70   Pulse 64   Temp 97.6  F (36.4  C) (Temporal)   Wt 67.6 kg (149 lb)   SpO2 97%   BMI 25.58 kg/m    Body mass index is 25.58  kg/m .  Physical Exam   GENERAL: healthy, alert and no distress  EYES: Eyes grossly normal to inspection, PERRL and conjunctivae and sclerae normal  HENT: ear canals and TM's normal, nose and mouth without ulcers or lesions  NECK: no adenopathy, no asymmetry, masses, or scars and thyroid normal to palpation  RESP: Expiratory cough noted. No wheezing. Diffuse rhonchi. No focal rales.  CV: regular rate and rhythm, normal S1 S2, no S3 or S4, no murmur, click or rub, no peripheral edema and peripheral pulses strong  MS: no gross musculoskeletal defects noted, no edema  NEURO: Normal strength and tone, mentation intact and speech normal  PSYCH: mentation appears normal and affect normal/bright    Lab on 11/09/2021   Component Date Value Ref Range Status     SARS CoV2 PCR 11/09/2021 Negative  Negative Final    NEGATIVE: SARS-CoV-2 (COVID-19) RNA not detected, presumed negative.

## 2021-11-16 NOTE — LETTER
21    Demographic Information on Do Hassan:    Do Hassan  : 1990  [address]  [facility] [address]  [phone] (home)     To Whom It May Concern:    Please excuse from work thru 21 due to a non-COVID respiratory illness.      Kendell Christianson MD

## 2022-01-06 ENCOUNTER — E-VISIT (OUTPATIENT)
Dept: URGENT CARE | Facility: CLINIC | Age: 32
End: 2022-01-06
Payer: COMMERCIAL

## 2022-01-06 DIAGNOSIS — J02.9 SORE THROAT: ICD-10-CM

## 2022-01-06 DIAGNOSIS — Z20.822 SUSPECTED COVID-19 VIRUS INFECTION: ICD-10-CM

## 2022-01-06 PROCEDURE — 99421 OL DIG E/M SVC 5-10 MIN: CPT | Performed by: PHYSICIAN ASSISTANT

## 2022-01-06 NOTE — PATIENT INSTRUCTIONS
Do,    Your symptoms show that you may have coronavirus (COVID-19). This illness can cause fever, cough and trouble breathing. Many people get a mild case and get better on their own. Some people can get very sick.    Because you also reported sore throat, I would like to also test you for Strep Throat to determine if we need to treat you for that as well.    What should I do?  We would like to test you for COVID-19 virus and Strep Throat. I have placed orders for these tests. To schedule: go to your [employer] home page and scroll down to the section that says  You have an appointment that needs to be scheduled  and click the large green button that says  Schedule Now  and follow the steps to find the next available openings. It is important that when you are asked what the reason for your appointment is that you mention you need BOTH COVID and Strep tests.    If you are unable to complete these [employer] scheduling steps, please call [phone] to schedule your testing.     Return to work/school/ guidance:   Please let your workplace manager and staffing office know when your isolation ends.       If you receive a positive COVID-19 test result, follow the guidance of the those who are giving you the results. Usually the return to work is 10 days from symptom onset or positive test date (or in some cases 20 days if you are immunocompromised). If your symptoms started after your positive test, the 10 days should start when your symptoms started.   o If you work at [facility], you must also be cleared by Employee Occupational Health and Safety to return to work.      If you receive a negative COVID-19 test result and did not have a high risk exposure to someone with a known positive COVID-19 test, you can return to work once you're free of fever for 24 hours without fever-reducing medication and your symptoms are improving or resolved.    If you receive a negative COVID-19 test and if you had a high  risk exposure to someone who has tested positive for COVID-19 then you can return to work 14 days after your last contact with the positive individual. Follow quarantine guidance given by your doctor or public health officials.    Sign up for [employer].   We know it's scary to hear that you might have COVID-19. We want to track your symptoms to make sure you're okay over the next 2 weeks. Please look for an email from [employer]--this is a free, online program that we'll use to keep in touch. To sign up, follow the link in the email you will receive. Learn more at [email or web address]    How can I take care of myself?  Over the counter medications may help with your symptoms like congestion, cough, chills, or fever.    There are not many effective prescription treatments for early COVID-19. Hydroxychloroquine, ivermectin, and azithromycin are not effective or recommended for COVID-19.    If your symptoms started in the last 10 days, you may be able to receive a treatment with monoclonal antibodies. This treatment can lower your risk of severe illness and going to the hospital. It is given through an IV or under your skin (subcutaneous) and must be given at an infusion center. You must be 12 or older, weight at least 88 pounds, and have a positive COVID-19 test.      If you would like to sign up to be considered to receive the monoclonal antibody medicine, please complete a participation form through the [facility] here: MNRAP ([email or web address].[facility].mn.us/diseases/coronavirus/mnrap.html). You may also call the [facility] COVID-19 Public Hotline at 1-761.357.4137 (open Mon-Fri: 9am-7pm and Sat: 10am-6pm).     Not all people who are eligible will receive the medicine, since supply is limited. You will be contacted in the next 1 to 2 business days only if you are selected. If you do not receive a call, you have not been selected to receive the medicine. If you have any questions about  this medication, please contact your primary care provider. For more information, see [email or web address]/[email or web address]/[email or web address]/[email or web address]      Get lots of rest. Drink extra fluids (unless a doctor has told you not to)    Take Tylenol (acetaminophen) or ibuprofen for fever or pain. If you have liver or kidney problems, ask your family doctor if it's okay to take Tylenol or ibuprofen    Take over the counter medications for your symptoms, as directed by your doctor. You may also talk to your pharmacist.      If you have other health problems (like cancer, heart failure, an organ transplant or severe kidney disease): Call your specialty clinic if you don't feel better in the next 2 days.    Know when to call 911. Emergency warning signs include:  o Trouble breathing or shortness of breath  o Pain or pressure in the chest that doesn't go away  o Feeling confused like you haven't felt before, or not being able to wake up  o Bluish-colored lips or face    Where can I get more information?    [facility] - About COVID-19: [email or web address]     CDC - What to Do If You're Sick:   www.cdc.gov/coronavirus/2019-ncov/about/steps-when-sick.html    CDC - Ending Home Isolation:  https://www.cdc.gov/coronavirus/2019-ncov/your-health/quarantine-isolation.html    CDC - Caring for Someone:  www.cdc.gov/coronavirus/2019-ncov/if-you-are-sick/care-for-someone.html    [facility] clinical trials (COVID-19 research studies): clinicalaffairs.[facility]/n-clinical-trials    Below are the COVID-19 hotlines at the [facility] of Health ([facility]). Interpreters are available.  o For health questions: Call 386-196-0820 or 1-471.146.7122 (7 a.m. to 7 p.m.)  o For questions about schools and childcare: Call 769-423-5434 or 1-687.717.8485 (7 a.m. to 7 p.m.)  January 6, 2022  RE:  Do Hassan                                                                                                                   [address]  [facility] [address]      To whom it may concern:    I evaluated Do Hassan on January 6, 2022. Do Hassan should be excused from work/school.     They should let their workplace manager and staffing office know when their quarantine ends.    We can not give an exact date as it depends on the information below. They can calculate this on their own or work with their manager/staffing office to calculate this. (For example if they were exposed on 10/04, they would have to quarantine for 14 full days. That would be through 10/18. They could return on 10/19.)    Quarantine Guidelines:    If patient receives a positive COVID-19 test result, they should follow the guidance of those who are giving the results. Usually the return to work is 10 (or in some cases 20 days from symptom onset.) If they work at [employer], they must be cleared by Employee Occupational Health and Safety to return to work.      If patient receives a negative COVID-19 test result and did not have a high risk exposure to someone with a known positive COVID-19 test, they can return to work once they're free of fever for 24 hours without fever-reducing medication and their symptoms are improving or resolved.    If patient receives a negative COVID-19 test and if they had a high risk exposure to someone who has tested positive for COVID-19 then they can return to work 14 days after their last contact with the positive individual    Note: If there is ongoing exposure to the covid positive person, this quarantine period may be longer than 14 days. (For example, if they are continually exposed to their child, who tested positive and cannot isolate from them, then the quarantine of 7-14 days can't start until their child is no longer contagious. This is typically 10 days from onset to the child's symptoms. So the total duration may be 17-24 days in this case.)     Sincerely,  Bhupendra Yanes PA-C          o

## 2022-01-10 ENCOUNTER — LAB (OUTPATIENT)
Dept: URGENT CARE | Facility: URGENT CARE | Age: 32
End: 2022-01-10
Attending: PHYSICIAN ASSISTANT
Payer: COMMERCIAL

## 2022-01-10 DIAGNOSIS — J02.9 SORE THROAT: ICD-10-CM

## 2022-01-10 DIAGNOSIS — Z20.822 SUSPECTED COVID-19 VIRUS INFECTION: ICD-10-CM

## 2022-01-10 LAB
DEPRECATED S PYO AG THROAT QL EIA: NEGATIVE
GROUP A STREP BY PCR: NOT DETECTED
SARS-COV-2 RNA RESP QL NAA+PROBE: NEGATIVE

## 2022-01-10 PROCEDURE — 99207 PR NO CHARGE LOS: CPT

## 2022-01-10 PROCEDURE — U0003 INFECTIOUS AGENT DETECTION BY NUCLEIC ACID (DNA OR RNA); SEVERE ACUTE RESPIRATORY SYNDROME CORONAVIRUS 2 (SARS-COV-2) (CORONAVIRUS DISEASE [COVID-19]), AMPLIFIED PROBE TECHNIQUE, MAKING USE OF HIGH THROUGHPUT TECHNOLOGIES AS DESCRIBED BY CMS-2020-01-R: HCPCS

## 2022-01-10 PROCEDURE — 87651 STREP A DNA AMP PROBE: CPT

## 2022-01-10 PROCEDURE — U0005 INFEC AGEN DETEC AMPLI PROBE: HCPCS

## 2022-01-18 ENCOUNTER — OFFICE VISIT (OUTPATIENT)
Dept: FAMILY MEDICINE | Facility: CLINIC | Age: 32
End: 2022-01-18
Payer: COMMERCIAL

## 2022-01-18 VITALS
HEART RATE: 78 BPM | HEIGHT: 64 IN | SYSTOLIC BLOOD PRESSURE: 100 MMHG | BODY MASS INDEX: 25.44 KG/M2 | OXYGEN SATURATION: 96 % | WEIGHT: 149 LBS | DIASTOLIC BLOOD PRESSURE: 60 MMHG | TEMPERATURE: 96.9 F

## 2022-01-18 DIAGNOSIS — F33.41 RECURRENT MAJOR DEPRESSIVE DISORDER, IN PARTIAL REMISSION (H): ICD-10-CM

## 2022-01-18 DIAGNOSIS — M62.89 PELVIC FLOOR DYSFUNCTION: ICD-10-CM

## 2022-01-18 DIAGNOSIS — G47.00 INSOMNIA, UNSPECIFIED TYPE: ICD-10-CM

## 2022-01-18 DIAGNOSIS — K44.9 HIATAL HERNIA: ICD-10-CM

## 2022-01-18 DIAGNOSIS — R53.83 FATIGUE, UNSPECIFIED TYPE: ICD-10-CM

## 2022-01-18 DIAGNOSIS — G90.A POSTURAL ORTHOSTATIC TACHYCARDIA SYNDROME: ICD-10-CM

## 2022-01-18 DIAGNOSIS — N80.9 ENDOMETRIOSIS: ICD-10-CM

## 2022-01-18 DIAGNOSIS — Z79.899 MEDICAL MARIJUANA USE: ICD-10-CM

## 2022-01-18 DIAGNOSIS — Z00.00 ROUTINE HISTORY AND PHYSICAL EXAMINATION OF ADULT: Primary | ICD-10-CM

## 2022-01-18 PROBLEM — J45.909 ASTHMA: Status: ACTIVE | Noted: 2022-01-18

## 2022-01-18 PROCEDURE — 99395 PREV VISIT EST AGE 18-39: CPT | Performed by: NURSE PRACTITIONER

## 2022-01-18 RX ORDER — TRAZODONE HYDROCHLORIDE 50 MG/1
25-50 TABLET, FILM COATED ORAL AT BEDTIME
Qty: 30 TABLET | Refills: 3 | Status: SHIPPED | OUTPATIENT
Start: 2022-01-18 | End: 2022-05-26

## 2022-01-18 RX ORDER — PROPRANOLOL HYDROCHLORIDE 10 MG/1
10 TABLET ORAL 3 TIMES DAILY
Qty: 90 TABLET | Refills: 3 | Status: SHIPPED | OUTPATIENT
Start: 2022-01-18 | End: 2022-06-15

## 2022-01-18 ASSESSMENT — MIFFLIN-ST. JEOR: SCORE: 1375.48
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
## 2022-01-19 NOTE — ED ADULT NURSE NOTE - LOCATION
Lyle Ms. Johnson,  Your results came back and are within acceptable limits. -Cholesterol levels (LDL,HDL, Triglycerides) are normal.  ADVISE: rechecking in 1 year. . If you have any further concerns please do not hesitate to contact us by message, phone or making an appointment.  Have a good day   Dr Pierce XAVIER  abdomen

## 2022-02-20 ENCOUNTER — E-VISIT (OUTPATIENT)
Dept: URGENT CARE | Facility: URGENT CARE | Age: 32
End: 2022-02-20
Payer: COMMERCIAL

## 2022-02-20 DIAGNOSIS — J06.9 VIRAL URI: ICD-10-CM

## 2022-02-20 DIAGNOSIS — J01.90 ACUTE SINUSITIS, RECURRENCE NOT SPECIFIED, UNSPECIFIED LOCATION: Primary | ICD-10-CM

## 2022-02-20 PROCEDURE — 99421 OL DIG E/M SVC 5-10 MIN: CPT | Performed by: EMERGENCY MEDICINE

## 2022-02-20 RX ORDER — GUAIFENESIN 1200 MG/1
1200 TABLET, EXTENDED RELEASE ORAL 2 TIMES DAILY
Qty: 60 TABLET | Refills: 0 | Status: SHIPPED | OUTPATIENT
Start: 2022-02-20 | End: 2022-05-02

## 2022-02-20 RX ORDER — FLUTICASONE PROPIONATE 50 MCG
1 SPRAY, SUSPENSION (ML) NASAL DAILY
Qty: 9.9 ML | Refills: 0 | Status: SHIPPED | OUTPATIENT
Start: 2022-02-20 | End: 2022-04-19

## 2022-02-20 NOTE — PATIENT INSTRUCTIONS
The symptoms you describe suggest a viral cause, - >90% of sinus infections are viral and do not respond to antibiotics. which is much more common than a bacterial cause. Antibiotics will treat bacterial infections, but have no effect on viral infections. If possible, especially if improving, start with symptom care for the first 7-10 days, then consider seeking further treatment or taking an antibiotic. Bacterial infections generally are more severe, including symptoms such as pus, fever over 101degrees F, or rapidly worsening.  Dear Do Hassan    After reviewing your responses, I've been able to diagnose you with?a sinus infection caused by a virus.? >90% of sinus infections are viral and do not respond to antibiotics.    Based on your responses and diagnosis, I have prescribed Mucinex and Flonase to treat your symptoms. I have sent this to your pharmacy.?     It is also important to stay well hydrated, get lots of rest and take over-the-counter decongestants,?tylenol?or ibuprofen if you?are able to?take those medications per your primary care provider to help relieve discomfort.?     It is important that you take?all of?your prescribed medication even if your symptoms are improving after a few doses.? Taking?all of?your medicine helps prevent the symptoms from returning.?     If your symptoms worsen, you develop severe headache, vomiting, high fever (>102), or are not improving in 7 days, please contact your primary care provider for an appointment or visit any of our convenient Walk-in Care or Urgent Care Centers to be seen which can be found on our website?here.?     Thanks again for choosing?us?as your health care partner,?   ?  Lan Nicholas PA-C?

## 2022-03-24 ENCOUNTER — MYC MEDICAL ADVICE (OUTPATIENT)
Dept: FAMILY MEDICINE | Facility: CLINIC | Age: 32
End: 2022-03-24

## 2022-03-24 ENCOUNTER — LAB (OUTPATIENT)
Dept: LAB | Facility: CLINIC | Age: 32
End: 2022-03-24
Payer: COMMERCIAL

## 2022-03-24 DIAGNOSIS — N80.9 ENDOMETRIOSIS: ICD-10-CM

## 2022-03-24 DIAGNOSIS — R53.83 FATIGUE, UNSPECIFIED TYPE: ICD-10-CM

## 2022-03-24 LAB
ALBUMIN SERPL-MCNC: 4.1 G/DL (ref 3.4–5)
ALP SERPL-CCNC: 49 U/L (ref 40–150)
ALT SERPL W P-5'-P-CCNC: 22 U/L (ref 0–50)
ANION GAP SERPL CALCULATED.3IONS-SCNC: 5 MMOL/L (ref 3–14)
AST SERPL W P-5'-P-CCNC: 10 U/L (ref 0–45)
BASOPHILS # BLD AUTO: 0 10E3/UL (ref 0–0.2)
BASOPHILS NFR BLD AUTO: 1 %
BILIRUB SERPL-MCNC: 0.4 MG/DL (ref 0.2–1.3)
BUN SERPL-MCNC: 11 MG/DL (ref 7–30)
CALCIUM SERPL-MCNC: 9 MG/DL (ref 8.5–10.1)
CHLORIDE BLD-SCNC: 107 MMOL/L (ref 94–109)
CO2 SERPL-SCNC: 27 MMOL/L (ref 20–32)
CREAT SERPL-MCNC: 0.77 MG/DL (ref 0.52–1.04)
EOSINOPHIL # BLD AUTO: 0.2 10E3/UL (ref 0–0.7)
EOSINOPHIL NFR BLD AUTO: 4 %
ERYTHROCYTE [DISTWIDTH] IN BLOOD BY AUTOMATED COUNT: 11.9 % (ref 10–15)
GFR SERPL CREATININE-BSD FRML MDRD: >90 ML/MIN/1.73M2
GLUCOSE BLD-MCNC: 76 MG/DL (ref 70–99)
HCT VFR BLD AUTO: 38.8 % (ref 35–47)
HGB BLD-MCNC: 13.1 G/DL (ref 11.7–15.7)
IMM GRANULOCYTES # BLD: 0 10E3/UL
IMM GRANULOCYTES NFR BLD: 0 %
IRON SATN MFR SERPL: 26 % (ref 15–46)
IRON SERPL-MCNC: 78 UG/DL (ref 35–180)
LYMPHOCYTES # BLD AUTO: 2.1 10E3/UL (ref 0.8–5.3)
LYMPHOCYTES NFR BLD AUTO: 42 %
MCH RBC QN AUTO: 30.2 PG (ref 26.5–33)
MCHC RBC AUTO-ENTMCNC: 33.8 G/DL (ref 31.5–36.5)
MCV RBC AUTO: 89 FL (ref 78–100)
MONOCYTES # BLD AUTO: 0.4 10E3/UL (ref 0–1.3)
MONOCYTES NFR BLD AUTO: 8 %
NEUTROPHILS # BLD AUTO: 2.2 10E3/UL (ref 1.6–8.3)
NEUTROPHILS NFR BLD AUTO: 45 %
NRBC # BLD AUTO: 0 10E3/UL
NRBC BLD AUTO-RTO: 0 /100
PLATELET # BLD AUTO: 253 10E3/UL (ref 150–450)
POTASSIUM BLD-SCNC: 3.7 MMOL/L (ref 3.4–5.3)
PROT SERPL-MCNC: 7.7 G/DL (ref 6.8–8.8)
RBC # BLD AUTO: 4.34 10E6/UL (ref 3.8–5.2)
SODIUM SERPL-SCNC: 139 MMOL/L (ref 133–144)
TIBC SERPL-MCNC: 297 UG/DL (ref 240–430)
TSH SERPL DL<=0.005 MIU/L-ACNC: 0.71 MU/L (ref 0.4–4)
WBC # BLD AUTO: 4.9 10E3/UL (ref 4–11)

## 2022-03-24 PROCEDURE — 84443 ASSAY THYROID STIM HORMONE: CPT

## 2022-03-24 PROCEDURE — 83550 IRON BINDING TEST: CPT

## 2022-03-24 PROCEDURE — 85004 AUTOMATED DIFF WBC COUNT: CPT

## 2022-03-24 PROCEDURE — 80053 COMPREHEN METABOLIC PANEL: CPT

## 2022-03-24 PROCEDURE — 36415 COLL VENOUS BLD VENIPUNCTURE: CPT

## 2022-04-12 ENCOUNTER — APPOINTMENT (OUTPATIENT)
Dept: CT IMAGING | Facility: CLINIC | Age: 32
End: 2022-04-12
Attending: EMERGENCY MEDICINE
Payer: COMMERCIAL

## 2022-04-12 ENCOUNTER — HOSPITAL ENCOUNTER (EMERGENCY)
Facility: CLINIC | Age: 32
Discharge: HOME | End: 2022-04-12
Attending: EMERGENCY MEDICINE | Admitting: EMERGENCY MEDICINE
Payer: COMMERCIAL

## 2022-04-12 VITALS
OXYGEN SATURATION: 95 % | SYSTOLIC BLOOD PRESSURE: 91 MMHG | TEMPERATURE: 98.4 F | HEART RATE: 72 BPM | RESPIRATION RATE: 15 BRPM | DIASTOLIC BLOOD PRESSURE: 46 MMHG

## 2022-04-12 DIAGNOSIS — R10.9 ACUTE ABDOMINAL PAIN: ICD-10-CM

## 2022-04-12 LAB
ALBUMIN SERPL-MCNC: 3.9 G/DL (ref 3.4–5)
ALBUMIN UR-MCNC: NEGATIVE MG/DL
ALP SERPL-CCNC: 50 U/L (ref 40–150)
ALT SERPL W P-5'-P-CCNC: 26 U/L (ref 0–50)
ANION GAP SERPL CALCULATED.3IONS-SCNC: 7 MMOL/L (ref 3–14)
APPEARANCE UR: CLEAR
AST SERPL W P-5'-P-CCNC: 19 U/L (ref 0–45)
BACTERIA #/AREA URNS HPF: ABNORMAL /HPF
BASOPHILS # BLD AUTO: 0.1 10E3/UL (ref 0–0.2)
BASOPHILS NFR BLD AUTO: 1 %
BILIRUB SERPL-MCNC: 0.2 MG/DL (ref 0.2–1.3)
BILIRUB UR QL STRIP: NEGATIVE
BUN SERPL-MCNC: 10 MG/DL (ref 7–30)
CALCIUM SERPL-MCNC: 8.8 MG/DL (ref 8.5–10.1)
CHLORIDE BLD-SCNC: 106 MMOL/L (ref 94–109)
CO2 SERPL-SCNC: 25 MMOL/L (ref 20–32)
COLOR UR AUTO: ABNORMAL
CREAT SERPL-MCNC: 0.68 MG/DL (ref 0.52–1.04)
EOSINOPHIL # BLD AUTO: 0.4 10E3/UL (ref 0–0.7)
EOSINOPHIL NFR BLD AUTO: 5 %
ERYTHROCYTE [DISTWIDTH] IN BLOOD BY AUTOMATED COUNT: 12.1 % (ref 10–15)
GFR SERPL CREATININE-BSD FRML MDRD: >90 ML/MIN/1.73M2
GLUCOSE BLD-MCNC: 103 MG/DL (ref 70–99)
GLUCOSE UR STRIP-MCNC: NEGATIVE MG/DL
HCG SERPL QL: NEGATIVE
HCT VFR BLD AUTO: 38.6 % (ref 35–47)
HGB BLD-MCNC: 13.1 G/DL (ref 11.7–15.7)
HGB UR QL STRIP: ABNORMAL
HOLD SPECIMEN: NORMAL
IMM GRANULOCYTES # BLD: 0 10E3/UL
IMM GRANULOCYTES NFR BLD: 0 %
KETONES UR STRIP-MCNC: NEGATIVE MG/DL
LACTATE SERPL-SCNC: 0.6 MMOL/L (ref 0.7–2)
LEUKOCYTE ESTERASE UR QL STRIP: NEGATIVE
LIPASE SERPL-CCNC: 142 U/L (ref 73–393)
LYMPHOCYTES # BLD AUTO: 2.8 10E3/UL (ref 0.8–5.3)
LYMPHOCYTES NFR BLD AUTO: 39 %
MCH RBC QN AUTO: 30.4 PG (ref 26.5–33)
MCHC RBC AUTO-ENTMCNC: 33.9 G/DL (ref 31.5–36.5)
MCV RBC AUTO: 90 FL (ref 78–100)
MONOCYTES # BLD AUTO: 0.3 10E3/UL (ref 0–1.3)
MONOCYTES NFR BLD AUTO: 5 %
MUCOUS THREADS #/AREA URNS LPF: PRESENT /LPF
NEUTROPHILS # BLD AUTO: 3.5 10E3/UL (ref 1.6–8.3)
NEUTROPHILS NFR BLD AUTO: 50 %
NITRATE UR QL: NEGATIVE
NRBC # BLD AUTO: 0 10E3/UL
NRBC BLD AUTO-RTO: 0 /100
PH UR STRIP: 7 [PH] (ref 5–7)
PLATELET # BLD AUTO: 281 10E3/UL (ref 150–450)
POTASSIUM BLD-SCNC: 4 MMOL/L (ref 3.4–5.3)
PROT SERPL-MCNC: 7.8 G/DL (ref 6.8–8.8)
RBC # BLD AUTO: 4.31 10E6/UL (ref 3.8–5.2)
RBC URINE: 1 /HPF
SODIUM SERPL-SCNC: 138 MMOL/L (ref 133–144)
SP GR UR STRIP: 1.02 (ref 1–1.03)
SQUAMOUS EPITHELIAL: <1 /HPF
UROBILINOGEN UR STRIP-MCNC: NORMAL MG/DL
WBC # BLD AUTO: 7 10E3/UL (ref 4–11)
WBC URINE: <1 /HPF

## 2022-04-12 PROCEDURE — 74177 CT ABD & PELVIS W/CONTRAST: CPT

## 2022-04-12 PROCEDURE — 80053 COMPREHEN METABOLIC PANEL: CPT | Performed by: NURSE PRACTITIONER

## 2022-04-12 PROCEDURE — 96376 TX/PRO/DX INJ SAME DRUG ADON: CPT

## 2022-04-12 PROCEDURE — 81001 URINALYSIS AUTO W/SCOPE: CPT | Performed by: NURSE PRACTITIONER

## 2022-04-12 PROCEDURE — 96375 TX/PRO/DX INJ NEW DRUG ADDON: CPT

## 2022-04-12 PROCEDURE — 250N000011 HC RX IP 250 OP 636: Performed by: NURSE PRACTITIONER

## 2022-04-12 PROCEDURE — 85025 COMPLETE CBC W/AUTO DIFF WBC: CPT | Performed by: NURSE PRACTITIONER

## 2022-04-12 PROCEDURE — 250N000011 HC RX IP 250 OP 636: Performed by: EMERGENCY MEDICINE

## 2022-04-12 PROCEDURE — 96361 HYDRATE IV INFUSION ADD-ON: CPT

## 2022-04-12 PROCEDURE — 74177 CT ABD & PELVIS W/CONTRAST: CPT | Mod: 26 | Performed by: RADIOLOGY

## 2022-04-12 PROCEDURE — 99285 EMERGENCY DEPT VISIT HI MDM: CPT | Mod: 25

## 2022-04-12 PROCEDURE — 99285 EMERGENCY DEPT VISIT HI MDM: CPT | Performed by: EMERGENCY MEDICINE

## 2022-04-12 PROCEDURE — 84703 CHORIONIC GONADOTROPIN ASSAY: CPT | Performed by: NURSE PRACTITIONER

## 2022-04-12 PROCEDURE — 258N000003 HC RX IP 258 OP 636: Performed by: NURSE PRACTITIONER

## 2022-04-12 PROCEDURE — 83605 ASSAY OF LACTIC ACID: CPT | Performed by: NURSE PRACTITIONER

## 2022-04-12 PROCEDURE — 96374 THER/PROPH/DIAG INJ IV PUSH: CPT | Mod: 59

## 2022-04-12 PROCEDURE — 36415 COLL VENOUS BLD VENIPUNCTURE: CPT | Performed by: NURSE PRACTITIONER

## 2022-04-12 PROCEDURE — 83690 ASSAY OF LIPASE: CPT | Performed by: NURSE PRACTITIONER

## 2022-04-12 RX ORDER — IOPAMIDOL 755 MG/ML
80 INJECTION, SOLUTION INTRAVASCULAR ONCE
Status: COMPLETED | OUTPATIENT
Start: 2022-04-12 | End: 2022-04-12

## 2022-04-12 RX ORDER — KETOROLAC TROMETHAMINE 15 MG/ML
15 INJECTION, SOLUTION INTRAMUSCULAR; INTRAVENOUS ONCE
Status: COMPLETED | OUTPATIENT
Start: 2022-04-12 | End: 2022-04-12

## 2022-04-12 RX ORDER — ONDANSETRON 2 MG/ML
4 INJECTION INTRAMUSCULAR; INTRAVENOUS EVERY 30 MIN PRN
Status: DISCONTINUED | OUTPATIENT
Start: 2022-04-12 | End: 2022-04-12 | Stop reason: HOSPADM

## 2022-04-12 RX ORDER — HYDROCODONE BITARTRATE AND ACETAMINOPHEN 5; 325 MG/1; MG/1
1 TABLET ORAL EVERY 6 HOURS PRN
Qty: 12 TABLET | Refills: 0 | Status: ON HOLD | OUTPATIENT
Start: 2022-04-12 | End: 2022-05-10

## 2022-04-12 RX ORDER — HYDROMORPHONE HYDROCHLORIDE 1 MG/ML
0.5 INJECTION, SOLUTION INTRAMUSCULAR; INTRAVENOUS; SUBCUTANEOUS
Status: DISCONTINUED | OUTPATIENT
Start: 2022-04-12 | End: 2022-04-12

## 2022-04-12 RX ORDER — HYDROMORPHONE HYDROCHLORIDE 1 MG/ML
0.5 INJECTION, SOLUTION INTRAMUSCULAR; INTRAVENOUS; SUBCUTANEOUS
Status: DISCONTINUED | OUTPATIENT
Start: 2022-04-12 | End: 2022-04-12 | Stop reason: HOSPADM

## 2022-04-12 RX ORDER — SODIUM CHLORIDE 9 MG/ML
INJECTION, SOLUTION INTRAVENOUS CONTINUOUS
Status: DISCONTINUED | OUTPATIENT
Start: 2022-04-12 | End: 2022-04-12 | Stop reason: HOSPADM

## 2022-04-12 RX ADMIN — HYDROMORPHONE HYDROCHLORIDE 0.5 MG: 1 INJECTION, SOLUTION INTRAMUSCULAR; INTRAVENOUS; SUBCUTANEOUS at 16:39

## 2022-04-12 RX ADMIN — SODIUM CHLORIDE: 9 INJECTION, SOLUTION INTRAVENOUS at 16:41

## 2022-04-12 RX ADMIN — IOPAMIDOL 80 ML: 755 INJECTION, SOLUTION INTRAVENOUS at 19:25

## 2022-04-12 RX ADMIN — SODIUM CHLORIDE 1000 ML: 9 INJECTION, SOLUTION INTRAVENOUS at 15:38

## 2022-04-12 RX ADMIN — ONDANSETRON 4 MG: 2 INJECTION INTRAMUSCULAR; INTRAVENOUS at 15:43

## 2022-04-12 RX ADMIN — HYDROMORPHONE HYDROCHLORIDE 0.5 MG: 1 INJECTION, SOLUTION INTRAMUSCULAR; INTRAVENOUS; SUBCUTANEOUS at 18:25

## 2022-04-12 RX ADMIN — ONDANSETRON 4 MG: 2 INJECTION INTRAMUSCULAR; INTRAVENOUS at 18:23

## 2022-04-12 RX ADMIN — KETOROLAC TROMETHAMINE 15 MG: 15 INJECTION, SOLUTION INTRAMUSCULAR; INTRAVENOUS at 15:46

## 2022-04-12 ASSESSMENT — ENCOUNTER SYMPTOMS
ABDOMINAL DISTENTION: 1
HEMATURIA: 0
CONSTIPATION: 0
ABDOMINAL PAIN: 1
DIFFICULTY URINATING: 0
NEUROLOGICAL NEGATIVE: 1
DYSURIA: 0
FEVER: 0
MUSCULOSKELETAL NEGATIVE: 1

## 2022-04-12 NOTE — LETTER
April 12, 2022      To Whom It May Concern:      Do Hassan was seen in our Emergency Department today, 04/12/22. She may return to work/school when improved.    Sincerely,        Grover Lynn MD

## 2022-04-12 NOTE — ED TRIAGE NOTES
ED Triage Provider Note  [facility]  Encounter Date: Apr 12, 2022    History:  Chief Complaint   Patient presents with     Abdominal Pain     Do Hassan is a 32 year old female with a history of endometriosis, ovarian cysts, POTS, elhlers-danlos syndrome who presents to the ED with uncontrolled abdominal pain. Patient currently having her period. Last night developed 9/10 abdominal pain and low back pain, took Norco and was able to sleep. This morning woke up with pain, took ibuprofen, norco, and medical marijuana. Pain is currently 7/10. Also having nausea, no vomiting. Denies chest pain, shortness of breath, dysuria, diarrhea.     Review of Systems:   ROS: 10 point ROS neg other than the symptoms noted above in the HPI.    Exam:  /64   Pulse 91   Temp 98.4  F (36.9  C) (Oral)   Resp 15   LMP 04/10/2022   SpO2 97%   General: No acute distress. Appears stated age.   Cardio: Regular rate, extremities well perfused  Resp: Normal work of breathing, grossly normal respiratory rate  Neuro: Alert. CN II-XII grossly intact. Grossly intact strength.   Abd: tender to palpation  Procedure note:    Peripheral Venous Access     Performed by VERENA Christensen CNP    Patient Identity confirmed: Yes    Risks, benefits and alternatives were discussed    Consent given by: Patient    Indication for Exam: Vascular Access     Vein/Location: left ac    Catheter Size: 18g    Number of Attempts: 1    Successful Catheter Insertion: yes    Complications: none    Medical Decision Making:  Patient arriving to the ED with problem as above. A medical screening exam was performed. CBC, CMP, lipase, lactate, UA orders initiated from Triage. The patient is appropriate to wait in triage.      VERENA Christensen CNP on 4/12/2022 at 3:09 PM

## 2022-04-12 NOTE — ED TRIAGE NOTES
Pt ambulatory to triage with c/o severe menstural pain. PHMX endometriosis, ovarian cysts. Pt states she started her period Sunday night, and since cramps/pain increasing. Per pt has used her Narco and was advised by clinic to come to ED for pain control. Pt A&Ox4, VSS on RA, pain 10/10.

## 2022-04-12 NOTE — ED PROVIDER NOTES
ED Provider Note  [facility]      History     Chief Complaint   Patient presents with     Abdominal Pain     The history is provided by the patient and medical records.     Do Hassan is a 32-year-old female with history of endometriosis. She reports this occasionally flares up when she is having her period, which is right now.  She rates the pain a 9 out of 10. She took a hydrocodone at home but it did not help with the pain.  She does not think she is pregnant. She says it feels like her previous endometriosis. She has had laparoscopic surgery in the past and this is the basis of her diagnosis. She is scheduled to have more this summer.  She says she always feels distended. She has not had fevers. No diarrhea, no constipation, no urinary symptoms.  The pain is global and cramping.    Past Medical History  Past Medical History:   Diagnosis Date     Anxiety      Arthritis      Depression      Óscar-Danlos syndrome      Endometriosis      Endometriosis 02/2017    evasion of endometriosis     Heartburn      Intussusception (H) 1996     Osteoporosis      POTS (postural orthostatic tachycardia syndrome)      Spina bifida occulta      Substance abuse (H)      Past Surgical History:   Procedure Laterality Date     anterior posterior spinal fusion  2008    L5-S1     APPENDECTOMY       CYSTECTOMY OVARIAN BENIGN  2020    left     DAVINCI ASSISTED ABLATION / EXCISION OF ENDOMETRIOSIS  2017     ORTHOPEDIC SURGERY       HYDROcodone-acetaminophen (NORCO) 5-325 MG tablet  famotidine (PEPCID) 40 MG tablet  FLUoxetine (PROZAC) 40 MG capsule  fluticasone (FLONASE) 50 MCG/ACT nasal spray  guaiFENesin 1200 MG TB12  magnesium citrate solution  propranolol (INDERAL) 10 MG tablet  psyllium (METAMUCIL/KONSYL) capsule  SENNA-docusate sodium (SENNA S) 8.6-50 MG tablet  sodium chloride 1 GM tablet  traZODone (DESYREL) 50 MG tablet      Allergies   Allergen Reactions     Contrast Dye Nausea     Family  History  Family History   Problem Relation Age of Onset     Arthritis Mother         hypothyroidism     Anorexia nervosa Mother      Heart Defect Mother         Leaky valve; possible heart failure related to anorexia     Constipation Father         Ashkenazi     Eating Disorder Sister      Hypothyroidism Maternal Grandmother      Osteoporosis Maternal Grandmother      Cancer Maternal Grandfather      Dementia Paternal Grandmother      Cerebrovascular Disease Paternal Grandfather      Alcoholism Paternal Grandfather      Social History   Social History     Tobacco Use     Smoking status: Former Smoker     Smokeless tobacco: Former User     Quit date: 1/1/2012   Substance Use Topics     Alcohol use: Not Currently     Comment: sober since age 19     Drug use: Not Currently      Past medical history, past surgical history, medications, allergies, family history, and social history were reviewed with the patient. No additional pertinent items.       Review of Systems   Constitutional: Negative for fever.   Gastrointestinal: Positive for abdominal distention and abdominal pain. Negative for constipation.   Genitourinary: Negative for difficulty urinating, dysuria and hematuria.   Musculoskeletal: Negative.    Neurological: Negative.    All other systems reviewed and are negative.      Physical Exam   BP: 110/64  Pulse: 91  Temp: 98.4  F (36.9  C)  Resp: 15  SpO2: 97 %  Physical Exam  Vitals and nursing note reviewed.   Constitutional:       General: She is not in acute distress.     Appearance: She is well-developed.   HENT:      Head: Normocephalic and atraumatic.      Mouth/Throat:      Mouth: Mucous membranes are moist.   Eyes:      Pupils: Pupils are equal, round, and reactive to light.   Cardiovascular:      Rate and Rhythm: Normal rate and regular rhythm.      Heart sounds: Normal heart sounds.   Pulmonary:      Effort: Pulmonary effort is normal. No respiratory distress.      Breath sounds: Normal breath sounds. No  wheezing.   Abdominal:      General: Abdomen is flat.      Palpations: Abdomen is soft.       Musculoskeletal:      Cervical back: Neck supple.   Skin:     General: Skin is warm and dry.   Neurological:      General: No focal deficit present.      Mental Status: She is alert and oriented to person, place, and time.      Cranial Nerves: No cranial nerve deficit.   Psychiatric:         Mood and Affect: Mood normal.         Behavior: Behavior normal.         ED Course      Procedures       Medications   ondansetron (ZOFRAN) injection 4 mg (4 mg Intravenous Not Given 4/12/22 2055)   0.9% sodium chloride BOLUS (0 mLs Intravenous Stopped 4/12/22 1641)     Followed by   sodium chloride 0.9% infusion (0 mLs Intravenous Stopped 4/12/22 2058)   HYDROmorphone (PF) (DILAUDID) injection 0.5 mg (0.5 mg Intravenous Given 4/12/22 1825)   ondansetron (ZOFRAN) injection 4 mg (4 mg Intravenous Given 4/12/22 1823)   ketorolac (TORADOL) injection 15 mg (15 mg Intravenous Given 4/12/22 1546)   iopamidol (ISOVUE-370) solution 80 mL (80 mLs Intravenous Given 4/12/22 1925)   sodium chloride (PF) 0.9% PF flush 73 mL (73 mLs Intravenous Given 4/12/22 1926)     CT Abdomen Pelvis w Contrast   Final Result   IMPRESSION:    1. No acute finding.   2. Focal narrowing of the left renal vein as it passes between the   aorta and the superior mesenteric artery, suggesting nutcracker   physiology. Associated engorged left gonadal vein, though the right   gonadal vein is prominent as well. Correlate for pelvic congestion   syndrome.      I have personally reviewed the examination and initial interpretation   and I agree with the findings.      EMILY CHONG DO            SYSTEM ID:  [id number]               Results for orders placed or performed during the hospital encounter of 04/12/22   CT Abdomen Pelvis w Contrast     Status: None    Narrative    EXAMINATION: CT ABDOMEN PELVIS W CONTRAST, 4/12/2022 7:29 PM    TECHNIQUE:  Helical CT images from the  lung bases through the  symphysis pubis were obtained  with IV contrast. Contrast dose:  iopamidol (ISOVUE-370) solution 80 mL    COMPARISON: Pelvic ultrasound 8/31/2021. CT 3/14/2021.    HISTORY: Endometriosis    FINDINGS:  Liver, gallbladder, bile ducts, pancreas, spleen, adrenal glands,  kidneys, ureters, and urinary bladder are unremarkable. Uterus and  adnexa are unremarkable. Tampon in the vagina. No dilated bowel.  Appendectomy. No significant free fluid. No free air. Distended left  gonadal vein inserting into the left renal vein which is significantly  compressed externally between the aorta and the superior mesenteric  artery. The right gonadal vein is prominent as well. No adenopathy.    Lower thorax:  Mild atelectasis in the lung bases, which are otherwise clear.    Bones and soft tissues:  Sacral fusion hardware unchanged. Spina bifida.      Impression    IMPRESSION:   1. No acute finding.  2. Focal narrowing of the left renal vein as it passes between the  aorta and the superior mesenteric artery, suggesting nutcracker  physiology. Associated engorged left gonadal vein, though the right  gonadal vein is prominent as well. Correlate for pelvic congestion  syndrome.    I have personally reviewed the examination and initial interpretation  and I agree with the findings.    EMILY CHONG,          SYSTEM ID:  [id number]   Comprehensive metabolic panel     Status: Abnormal   Result Value Ref Range    Sodium 138 133 - 144 mmol/L    Potassium 4.0 3.4 - 5.3 mmol/L    Chloride 106 94 - 109 mmol/L    Carbon Dioxide (CO2) 25 20 - 32 mmol/L    Anion Gap 7 3 - 14 mmol/L    Urea Nitrogen 10 7 - 30 mg/dL    Creatinine 0.68 0.52 - 1.04 mg/dL    Calcium 8.8 8.5 - 10.1 mg/dL    Glucose 103 (H) 70 - 99 mg/dL    Alkaline Phosphatase 50 40 - 150 U/L    AST 19 0 - 45 U/L    ALT 26 0 - 50 U/L    Protein Total 7.8 6.8 - 8.8 g/dL    Albumin 3.9 3.4 - 5.0 g/dL    Bilirubin Total 0.2 0.2 - 1.3 mg/dL    GFR Estimate >90 >60  mL/min/1.73m2   Lipase     Status: Normal   Result Value Ref Range    Lipase 142 73 - 393 U/L   Lactic acid whole blood     Status: Abnormal   Result Value Ref Range    Lactic Acid 0.6 (L) 0.7 - 2.0 mmol/L   UA with Microscopic reflex to Culture     Status: Abnormal    Specimen: Urine, Clean Catch   Result Value Ref Range    Color Urine Light Yellow Colorless, Straw, Light Yellow, Yellow    Appearance Urine Clear Clear    Glucose Urine Negative Negative mg/dL    Bilirubin Urine Negative Negative    Ketones Urine Negative Negative mg/dL    Specific Gravity Urine 1.022 1.003 - 1.035    Blood Urine Trace (A) Negative    pH Urine 7.0 5.0 - 7.0    Protein Albumin Urine Negative Negative mg/dL    Urobilinogen Urine Normal Normal, 2.0 mg/dL    Nitrite Urine Negative Negative    Leukocyte Esterase Urine Negative Negative    Bacteria Urine Few (A) None Seen /HPF    Mucus Urine Present (A) None Seen /LPF    RBC Urine 1 <=2 /HPF    WBC Urine <1 <=5 /HPF    Squamous Epithelials Urine <1 <=1 /HPF    Narrative    Urine Culture not indicated   HCG qualitative Blood     Status: Normal   Result Value Ref Range    hCG Serum Qualitative Negative Negative   [place] Draw     Status: None (In process)    Narrative    The following orders were created for panel order [place] Draw.  Procedure                               Abnormality         Status                     ---------                               -----------         ------                     Extra Blue Top Tube[[id number]]                              Final result               Extra Red Top Tube[[id number]]                                                          Extra Green Top (Lithium...[[id number]]                      Final result               Extra Purple Top Tube[[id number]]                            Final result                 Please view results for these tests on the individual orders.   CBC with platelets and differential     Status: None   Result Value Ref Range     WBC Count 7.0 4.0 - 11.0 10e3/uL    RBC Count 4.31 3.80 - 5.20 10e6/uL    Hemoglobin 13.1 11.7 - 15.7 g/dL    Hematocrit 38.6 35.0 - 47.0 %    MCV 90 78 - 100 fL    MCH 30.4 26.5 - 33.0 pg    MCHC 33.9 31.5 - 36.5 g/dL    RDW 12.1 10.0 - 15.0 %    Platelet Count 281 150 - 450 10e3/uL    % Neutrophils 50 %    % Lymphocytes 39 %    % Monocytes 5 %    % Eosinophils 5 %    % Basophils 1 %    % Immature Granulocytes 0 %    NRBCs per 100 WBC 0 <1 /100    Absolute Neutrophils 3.5 1.6 - 8.3 10e3/uL    Absolute Lymphocytes 2.8 0.8 - 5.3 10e3/uL    Absolute Monocytes 0.3 0.0 - 1.3 10e3/uL    Absolute Eosinophils 0.4 0.0 - 0.7 10e3/uL    Absolute Basophils 0.1 0.0 - 0.2 10e3/uL    Absolute Immature Granulocytes 0.0 <=0.4 10e3/uL    Absolute NRBCs 0.0 10e3/uL   Extra Blue Top Tube     Status: None   Result Value Ref Range    Hold Specimen JIC    Extra Green Top (Lithium Heparin) Tube     Status: None   Result Value Ref Range    Hold Specimen JIC    Extra Purple Top Tube     Status: None   Result Value Ref Range    Hold Specimen JIC    CBC with platelets differential     Status: None    Narrative    The following orders were created for panel order CBC with platelets differential.  Procedure                               Abnormality         Status                     ---------                               -----------         ------                     CBC with platelets and d...[[id number]]                      Final result                 Please view results for these tests on the individual orders.     Medications   ondansetron (ZOFRAN) injection 4 mg (4 mg Intravenous Not Given 4/12/22 2055)   0.9% sodium chloride BOLUS (0 mLs Intravenous Stopped 4/12/22 1641)     Followed by   sodium chloride 0.9% infusion (0 mLs Intravenous Stopped 4/12/22 2058)   HYDROmorphone (PF) (DILAUDID) injection 0.5 mg (0.5 mg Intravenous Given 4/12/22 1825)   ondansetron (ZOFRAN) injection 4 mg (4 mg Intravenous Given 4/12/22 7693)   ketorolac  (TORADOL) injection 15 mg (15 mg Intravenous Given 4/12/22 1546)   iopamidol (ISOVUE-370) solution 80 mL (80 mLs Intravenous Given 4/12/22 1925)   sodium chloride (PF) 0.9% PF flush 73 mL (73 mLs Intravenous Given 4/12/22 1926)        Assessments & Plan (with Medical Decision Making)   This is a 32-year-old female with past history of endometriosis who comes in with abdominal pain.  She reports the pain feels similar to previous flareups.  She states she will occasionally get flareups of this when she is on her period.  Patient states she is currently menstruating.  Her labs were unremarkable we did do a CT of the abdomen with IV contrast.  The only abnormality was dilated ovarian veins suggestive of possible pelvic congestion syndrome.  She feels better after pain medication.  We will discharge her with recommendation to follow-up with gynecology to discuss this as a possible etiology for her ongoing pain.  ---  This part of the medical record was transcribed by Phan Dudley, Medical Scribe, from a dictation done by Grover Lynn MD.       I have reviewed the nursing notes. I have reviewed the findings, diagnosis, plan and need for follow up with the patient.    New Prescriptions    HYDROCODONE-ACETAMINOPHEN (NORCO) 5-325 MG TABLET    Take 1 tablet by mouth every 6 hours as needed for pain       Final diagnoses:   Acute abdominal pain     I, Phan Dudley, am serving as a trained medical scribe to document services personally performed by Grover Lynn MD, based on the provider's statements to me.      I, Grover Lynn MD, was physically present and have reviewed and verified the accuracy of this note documented by Phan Dudley.   --    [facility] EMERGENCY DEPARTMENT  4/12/2022     Grover Lynn MD  04/12/22 4515

## 2022-04-13 NOTE — DISCHARGE INSTRUCTIONS
All of your tests are normal  Question of Pelvic Congestion Syndrome on your CT scan  Use pain medication as needed  Follow-up with your gynecologist

## 2022-04-14 ENCOUNTER — TRANSFERRED RECORDS (OUTPATIENT)
Dept: HEALTH INFORMATION MANAGEMENT | Facility: CLINIC | Age: 32
End: 2022-04-14
Payer: COMMERCIAL

## 2022-04-15 ENCOUNTER — APPOINTMENT (OUTPATIENT)
Dept: CT IMAGING | Facility: CLINIC | Age: 32
End: 2022-04-15
Attending: EMERGENCY MEDICINE
Payer: COMMERCIAL

## 2022-04-15 ENCOUNTER — APPOINTMENT (OUTPATIENT)
Dept: ULTRASOUND IMAGING | Facility: CLINIC | Age: 32
End: 2022-04-15
Attending: EMERGENCY MEDICINE
Payer: COMMERCIAL

## 2022-04-15 ENCOUNTER — HOSPITAL ENCOUNTER (EMERGENCY)
Facility: CLINIC | Age: 32
Discharge: HOME | End: 2022-04-15
Attending: EMERGENCY MEDICINE | Admitting: EMERGENCY MEDICINE
Payer: COMMERCIAL

## 2022-04-15 VITALS
RESPIRATION RATE: 15 BRPM | SYSTOLIC BLOOD PRESSURE: 113 MMHG | OXYGEN SATURATION: 98 % | HEIGHT: 64 IN | BODY MASS INDEX: 25.61 KG/M2 | WEIGHT: 150 LBS | TEMPERATURE: 97.8 F | DIASTOLIC BLOOD PRESSURE: 73 MMHG | HEART RATE: 72 BPM

## 2022-04-15 DIAGNOSIS — R10.84 ABDOMINAL PAIN, GENERALIZED: ICD-10-CM

## 2022-04-15 LAB
ALBUMIN UR-MCNC: NEGATIVE MG/DL
APPEARANCE UR: CLEAR
BACTERIA #/AREA URNS HPF: ABNORMAL /HPF
BASOPHILS # BLD AUTO: 0.1 10E3/UL (ref 0–0.2)
BASOPHILS NFR BLD AUTO: 1 %
BILIRUB UR QL STRIP: NEGATIVE
COLOR UR AUTO: ABNORMAL
EOSINOPHIL # BLD AUTO: 0.3 10E3/UL (ref 0–0.7)
EOSINOPHIL NFR BLD AUTO: 6 %
ERYTHROCYTE [DISTWIDTH] IN BLOOD BY AUTOMATED COUNT: 12 % (ref 10–15)
GLUCOSE UR STRIP-MCNC: NEGATIVE MG/DL
HCG UR QL: NEGATIVE
HCT VFR BLD AUTO: 37.6 % (ref 35–47)
HGB BLD-MCNC: 12.6 G/DL (ref 11.7–15.7)
HGB UR QL STRIP: NEGATIVE
IMM GRANULOCYTES # BLD: 0 10E3/UL
IMM GRANULOCYTES NFR BLD: 0 %
KETONES UR STRIP-MCNC: NEGATIVE MG/DL
LEUKOCYTE ESTERASE UR QL STRIP: NEGATIVE
LYMPHOCYTES # BLD AUTO: 2.1 10E3/UL (ref 0.8–5.3)
LYMPHOCYTES NFR BLD AUTO: 42 %
MCH RBC QN AUTO: 30.2 PG (ref 26.5–33)
MCHC RBC AUTO-ENTMCNC: 33.5 G/DL (ref 31.5–36.5)
MCV RBC AUTO: 90 FL (ref 78–100)
MONOCYTES # BLD AUTO: 0.3 10E3/UL (ref 0–1.3)
MONOCYTES NFR BLD AUTO: 5 %
NEUTROPHILS # BLD AUTO: 2.3 10E3/UL (ref 1.6–8.3)
NEUTROPHILS NFR BLD AUTO: 46 %
NITRATE UR QL: NEGATIVE
NRBC # BLD AUTO: 0 10E3/UL
NRBC BLD AUTO-RTO: 0 /100
PH UR STRIP: 7.5 [PH] (ref 5–7)
PLATELET # BLD AUTO: 233 10E3/UL (ref 150–450)
RBC # BLD AUTO: 4.17 10E6/UL (ref 3.8–5.2)
RBC URINE: 1 /HPF
SP GR UR STRIP: 1.01 (ref 1–1.03)
SQUAMOUS EPITHELIAL: <1 /HPF
UROBILINOGEN UR STRIP-MCNC: NORMAL MG/DL
WBC # BLD AUTO: 5.2 10E3/UL (ref 4–11)
WBC URINE: 1 /HPF

## 2022-04-15 PROCEDURE — 74177 CT ABD & PELVIS W/CONTRAST: CPT

## 2022-04-15 PROCEDURE — 81001 URINALYSIS AUTO W/SCOPE: CPT | Performed by: EMERGENCY MEDICINE

## 2022-04-15 PROCEDURE — 250N000011 HC RX IP 250 OP 636: Performed by: EMERGENCY MEDICINE

## 2022-04-15 PROCEDURE — 96361 HYDRATE IV INFUSION ADD-ON: CPT

## 2022-04-15 PROCEDURE — 99285 EMERGENCY DEPT VISIT HI MDM: CPT | Mod: 25

## 2022-04-15 PROCEDURE — 36415 COLL VENOUS BLD VENIPUNCTURE: CPT | Performed by: EMERGENCY MEDICINE

## 2022-04-15 PROCEDURE — 93976 VASCULAR STUDY: CPT

## 2022-04-15 PROCEDURE — 81025 URINE PREGNANCY TEST: CPT | Performed by: EMERGENCY MEDICINE

## 2022-04-15 PROCEDURE — 96375 TX/PRO/DX INJ NEW DRUG ADDON: CPT

## 2022-04-15 PROCEDURE — 250N000009 HC RX 250: Performed by: EMERGENCY MEDICINE

## 2022-04-15 PROCEDURE — 96374 THER/PROPH/DIAG INJ IV PUSH: CPT | Mod: 59

## 2022-04-15 PROCEDURE — 258N000003 HC RX IP 258 OP 636: Performed by: EMERGENCY MEDICINE

## 2022-04-15 PROCEDURE — 85025 COMPLETE CBC W/AUTO DIFF WBC: CPT | Performed by: EMERGENCY MEDICINE

## 2022-04-15 RX ORDER — IOPAMIDOL 755 MG/ML
75 INJECTION, SOLUTION INTRAVASCULAR ONCE
Status: COMPLETED | OUTPATIENT
Start: 2022-04-15 | End: 2022-04-15

## 2022-04-15 RX ORDER — SODIUM CHLORIDE 9 MG/ML
INJECTION, SOLUTION INTRAVENOUS CONTINUOUS
Status: DISCONTINUED | OUTPATIENT
Start: 2022-04-15 | End: 2022-04-15 | Stop reason: HOSPADM

## 2022-04-15 RX ORDER — KETOROLAC TROMETHAMINE 15 MG/ML
15 INJECTION, SOLUTION INTRAMUSCULAR; INTRAVENOUS ONCE
Status: COMPLETED | OUTPATIENT
Start: 2022-04-15 | End: 2022-04-15

## 2022-04-15 RX ORDER — ONDANSETRON 2 MG/ML
4 INJECTION INTRAMUSCULAR; INTRAVENOUS ONCE
Status: COMPLETED | OUTPATIENT
Start: 2022-04-15 | End: 2022-04-15

## 2022-04-15 RX ORDER — HYDROMORPHONE HYDROCHLORIDE 1 MG/ML
0.5 INJECTION, SOLUTION INTRAMUSCULAR; INTRAVENOUS; SUBCUTANEOUS
Status: COMPLETED | OUTPATIENT
Start: 2022-04-15 | End: 2022-04-15

## 2022-04-15 RX ADMIN — KETOROLAC TROMETHAMINE 15 MG: 15 INJECTION, SOLUTION INTRAMUSCULAR; INTRAVENOUS at 13:49

## 2022-04-15 RX ADMIN — HYDROMORPHONE HYDROCHLORIDE 0.5 MG: 1 INJECTION, SOLUTION INTRAMUSCULAR; INTRAVENOUS; SUBCUTANEOUS at 10:34

## 2022-04-15 RX ADMIN — IOPAMIDOL 75 ML: 755 INJECTION, SOLUTION INTRAVENOUS at 13:09

## 2022-04-15 RX ADMIN — SODIUM CHLORIDE: 9 INJECTION, SOLUTION INTRAVENOUS at 11:32

## 2022-04-15 RX ADMIN — SODIUM CHLORIDE 62 ML: 900 INJECTION INTRAVENOUS at 13:10

## 2022-04-15 RX ADMIN — SODIUM CHLORIDE 1000 ML: 9 INJECTION, SOLUTION INTRAVENOUS at 10:34

## 2022-04-15 RX ADMIN — ONDANSETRON 4 MG: 2 INJECTION INTRAMUSCULAR; INTRAVENOUS at 13:02

## 2022-04-15 ASSESSMENT — ENCOUNTER SYMPTOMS
DIFFICULTY URINATING: 0
FEVER: 0
CONSTIPATION: 1
NAUSEA: 1
HEMATURIA: 0
DYSURIA: 0
VOMITING: 0
ABDOMINAL PAIN: 1
SHORTNESS OF BREATH: 0

## 2022-04-15 NOTE — ED NOTES
Patient left side-lying on cart. Updated on continued POC and plan for discharge. Patient denies much change in pain. Denies any needs at present.

## 2022-04-15 NOTE — ED NOTES
Patient returned from imaging exam. Denies any changes in symptoms. Denies any needs at present. Update don continued POC and waits. Continue to monitor.

## 2022-04-15 NOTE — ED NOTES
Patient completed bottle of water. Patient reports need to urinate. Patient escorted to BR. Gait stable. Patient has increased pain with position changes. Patient ambulated to BR. Gait stable.

## 2022-04-15 NOTE — ED NOTES
Patient lying supine on cart. Patient is awake and alert and oriented x 4. RN introduced self to patient. Updated on continued pOC and waits. Patient continues to have abdominal pain that worsens with movements like sitting up. Patient HOB elevated to facilitate drinking. Patient given bottle of water. Patient updated on continued POC and waits. Patient rates pain at 6-7/10. Patient denies any additional needs at present. Continue to monitor.

## 2022-04-15 NOTE — ED PROVIDER NOTES
History   Chief Complaint:  Abdominal Pain     HPI   Do Hassan is a 32 year old female with history of endometriosis who presents with diffuse abdominal pain for the past three days. She was seen in the ED three days ago and had CT imaging (see impression below). She was discharged home and saw OB-GYN and vascular surgery the following days. The vascular surgeon reviewed the images from her CT scan and noted some bowel loops and adhesions that were not noted in the report. Last night, the pain improved a little and she thought she could go back to work in the morning. Today, when she stands straight, her whole abdomen is intensely painful. When she lays flat, she has some pain, but it is much worse when she moves or stands straight. She returned to the ED today because the pain is even more diffuse than before, and she is now intensely nauseous as well. She did not think she could eat breakfast today, so just took her morning medications with some water. She ate a small dinner last night. Her menstrual cycle just ended, so she is sure that the pain is not due to menstrual cramps. States it feels like when she had endometritis after childbirth. She has chronic constipation and uses miralax. She was able to have a bowel movement yesterday. States it was not more painful than usual. Denies urinary symptoms and leg pain/ swelling. She uses medical marijuana and norco for endometrial pain. No pain medication today.     CT abdomen pelvis w contrast (4/12/2022):  1. No acute finding.  2. Focal narrowing of the left renal vein as it passes between the aorta and the superior mesenteric artery, suggesting nutcracker physiology. Associated engorged left gonadal vein, though the right gonadal vein is prominent as well. Correlate for pelvic congestion Syndrome.  Report per Tamie Hurtado DO.     Review of Systems   Constitutional: Negative for fever.   Respiratory: Negative for shortness of breath.    Gastrointestinal:  "Positive for abdominal pain, constipation and nausea. Negative for vomiting.   Genitourinary: Negative for difficulty urinating, dysuria and hematuria.     10 point review of systems performed and is negative except as above and in HPI.     Allergies:  Contrast Dye    Medications:  Prozac  Flonase  Norco  Inderal  Senna S  Desyrel  Pepcid  Sodium chloride 1 GM tablet    Past Medical History:     Anxiety  Arthritis  Depression  Óscar-Danlos syndrome  Endometriosis  Intussusception  Osteoporosis  POTS  Spina bifida occulta  Substance abuse  DDD  Scoliosis  Medical marijuana use  Asthma       Past Surgical History:    Anterior posterior spinal fusion L5-S1  Appendectomy  Cystectomy ovarian benign, left   DaVinci assisted ablation/ excision of endometriosis   Orthopedic surgery      Family History:    Mother: arthritis, hypothyroidism, anorexia nervosa, heart defect, leaky valve  Father: constipation  Sister: eating disorder     Social History:  The patient presents to the ED alone.   The patient has a son.   PCP: Wendi Fernandez APRN CNP.     Physical Exam     Patient Vitals for the past 24 hrs:   BP Temp Temp src Pulse Resp SpO2 Height Weight   04/15/22 1258 113/73 -- -- -- -- 98 % -- --   04/15/22 1002 113/58 97.8  F (36.6  C) Temporal 72 18 96 % 1.626 m (5' 4\") 68 kg (150 lb)       Physical Exam  General: Resting on the gurney, appears mildly uncomfortable  Head:  The scalp, face, and head appear normal  Mouth/Throat: Mucus membranes are moist  CV:  Regular rate    Normal S1 and S2  No pathological murmur   Resp:  Breath sounds clear and equal bilaterally    Non-labored, no retractions or accessory muscle use    No coarseness    No wheezing   GI:  Abdomen is soft, no rigidity    Moderate diffuse abdominal tenderness to palpation.     Voluntary guarding. Rebound present.   MS:  Normal motor assessment of all extremities.    Good capillary refill noted.  Skin:  No rash or lesions noted.  Neuro:  Speech is normal " and fluent. No apparent deficit.  Psych:  Awake. Alert.  Normal affect.      Appropriate interactions.     Emergency Department Course     Imaging:  CT Abdomen Pelvis w Contrast   Preliminary Result   IMPRESSION:    1. No acute pathology in the abdomen or pelvis.   2. Again noted apparent narrowing of the left renal vein as it passes   between the SMA and the aorta, which can be seen with nutcracker   syndrome. Prominent left gonadal vein and pelvic vasculature, which   could also be related to the left renal vein narrowing.   3. Moderate amount of stool throughout the colon, nonspecific, can be   seen with constipation.      US Pelvis Cmplt w Transvag & Doppler LmtPel Duplex Limited   Final Result   IMPRESSION:   1.  Normal pelvic ultrasound. No evidence of torsion.         JOHN SANTA MD            SYSTEM ID:  [id number]        Report per radiology    Laboratory:  Labs Ordered and Resulted from Time of ED Arrival to Time of ED Departure   ROUTINE UA WITH MICROSCOPIC REFLEX TO CULTURE - Abnormal       Result Value    Color Urine Light Yellow      Appearance Urine Clear      Glucose Urine Negative      Bilirubin Urine Negative      Ketones Urine Negative      Specific Gravity Urine 1.010      Blood Urine Negative      pH Urine 7.5 (*)     Protein Albumin Urine Negative      Urobilinogen Urine Normal      Nitrite Urine Negative      Leukocyte Esterase Urine Negative      Bacteria Urine Few (*)     RBC Urine 1      WBC Urine 1      Squamous Epithelials Urine <1     HCG QUALITATIVE URINE - Normal    hCG Urine Qualitative Negative     CBC WITH PLATELETS AND DIFFERENTIAL    WBC Count 5.2      RBC Count 4.17      Hemoglobin 12.6      Hematocrit 37.6      MCV 90      MCH 30.2      MCHC 33.5      RDW 12.0      Platelet Count 233      % Neutrophils 46      % Lymphocytes 42      % Monocytes 5      % Eosinophils 6      % Basophils 1      % Immature Granulocytes 0      NRBCs per 100 WBC 0      Absolute Neutrophils 2.3       Absolute Lymphocytes 2.1      Absolute Monocytes 0.3      Absolute Eosinophils 0.3      Absolute Basophils 0.1      Absolute Immature Granulocytes 0.0      Absolute NRBCs 0.0          Procedures    Emergency Department Course:    Reviewed:  I reviewed nursing notes, vitals, past medical history and Care Everywhere    Assessments:  1011 I obtained history and examined the patient as noted above.   1245 I rechecked the patient and explained findings.   1402 I rechecked the patient and explained findings.     Interventions:  1034 Dilaudid 0.5mg IV  1034 0.9% sodium chloride BOLUS 1000mL IV   1132 Sodium chloride 0.9% infusion  1302 Zofran 4mg IV    Disposition:  The patient was discharged to home.     Impression & Plan     Medical Decision Making:  The differential diagnosis included but was not limited to ectopic pregnancy, UTI, ovarian cyst, ovarian torsion, PID, appendicitis, nephrolithiasis. The ED evaluation included transvaginal ultrasound, CT with contrast, blood work, and urinalysis all of which was unremarkable. The patient is feeling better after treatment in the ED. At this time the etiology of the pain is undetermined. With reasonable clinical certainty I feel that the patient is safe for discharge home for ongoing evaluation and management as an outpatient. The patient understands the importance of returning to the ED with new or worse symptoms, in particular fever, vomiting. I have recommended follow up in gyn and vascular clinic and with her specialists for a repeat evaluation. With reasonable clinical certainty I feel that the patient is safe for discharge home for ongoing evaluation and management as an outpatient. I have recommended follow up with her Primary MD. [facility]     Diagnosis:    ICD-10-CM    1. Abdominal pain, generalized  R10.84        Scribe Disclosure:  Lorena JAY, am serving as a scribe at 10:03 AM on 4/15/2022 to  document services personally performed by Martita Macedo MD based on my observations and the provider's statements to me.      Martita Macedo MD  04/15/22 [id number]

## 2022-04-15 NOTE — LETTER
April 15, 2022      To Whom It May Concern:      Do Hassan was seen in our Emergency Department today, 04/15/22.  I expect her condition to improve over the next 2-3 days.  She may return to work when improved.    Sincerely,        Florencia Jarvis RN

## 2022-04-15 NOTE — ED TRIAGE NOTES
Pt here from work for abd pain. Pt was seen in this ED on the 12 and had a ct that showed possible pelvic congestion. Hx of endometriosis. She is having worsening abd pain and nausea. Pt Aox4.

## 2022-04-18 ENCOUNTER — MYC MEDICAL ADVICE (OUTPATIENT)
Dept: FAMILY MEDICINE | Facility: CLINIC | Age: 32
End: 2022-04-18
Payer: COMMERCIAL

## 2022-04-18 DIAGNOSIS — J01.90 ACUTE SINUSITIS, RECURRENCE NOT SPECIFIED, UNSPECIFIED LOCATION: ICD-10-CM

## 2022-04-19 DIAGNOSIS — F33.9 RECURRENT MAJOR DEPRESSIVE DISORDER, REMISSION STATUS UNSPECIFIED (H): ICD-10-CM

## 2022-04-19 RX ORDER — FLUTICASONE PROPIONATE 50 MCG
SPRAY, SUSPENSION (ML) NASAL
Qty: 16 G | Refills: 0 | Status: SHIPPED | OUTPATIENT
Start: 2022-04-19 | End: 2022-05-02

## 2022-04-20 RX ORDER — FLUOXETINE 40 MG/1
40 CAPSULE ORAL DAILY
Qty: 90 CAPSULE | Refills: 3 | Status: SHIPPED | OUTPATIENT
Start: 2022-04-20

## 2022-04-20 NOTE — TELEPHONE ENCOUNTER
"PHQ9 sent via [employer].     Requested Prescriptions   Pending Prescriptions Disp Refills     FLUoxetine (PROZAC) 40 MG capsule 90 capsule 3     Sig: Take 1 capsule (40 mg) by mouth daily       SSRIs Protocol Failed - 4/19/2022  2:53 PM        Failed - PHQ-9 score less than 5 in past 6 months     Please review last PHQ-9 score.           Passed - Medication is active on med list        Passed - Patient is age 18 or older        Passed - No active pregnancy on record        Passed - No positive pregnancy test in last 12 months        Passed - Recent (6 mo) or future (30 days) visit within the authorizing provider's specialty     Patient had office visit in the last 6 months or has a visit in the next 30 days with authorizing provider or within the authorizing provider's specialty.  See \"Patient Info\" tab in inbasket, or \"Choose Columns\" in Meds & Orders section of the refill encounter.               Thanks,  SEVERIANO Henao  [facility]     "

## 2022-04-22 RX ORDER — ACETAMINOPHEN 325 MG/1
975 TABLET ORAL ONCE
Status: CANCELLED | OUTPATIENT
Start: 2022-04-22 | End: 2022-04-22

## 2022-04-22 RX ORDER — CEFAZOLIN SODIUM/WATER 2 G/20 ML
2 SYRINGE (ML) INTRAVENOUS
Status: CANCELLED | OUTPATIENT
Start: 2022-04-22

## 2022-04-22 RX ORDER — CEFAZOLIN SODIUM/WATER 2 G/20 ML
2 SYRINGE (ML) INTRAVENOUS SEE ADMIN INSTRUCTIONS
Status: CANCELLED | OUTPATIENT
Start: 2022-04-22

## 2022-04-26 DIAGNOSIS — Z11.59 ENCOUNTER FOR SCREENING FOR OTHER VIRAL DISEASES: Primary | ICD-10-CM

## 2022-05-02 ENCOUNTER — OFFICE VISIT (OUTPATIENT)
Dept: FAMILY MEDICINE | Facility: CLINIC | Age: 32
End: 2022-05-02
Payer: COMMERCIAL

## 2022-05-02 VITALS
BODY MASS INDEX: 26.36 KG/M2 | RESPIRATION RATE: 16 BRPM | OXYGEN SATURATION: 96 % | WEIGHT: 154.4 LBS | HEIGHT: 64 IN | HEART RATE: 74 BPM | DIASTOLIC BLOOD PRESSURE: 68 MMHG | TEMPERATURE: 99.2 F | SYSTOLIC BLOOD PRESSURE: 101 MMHG

## 2022-05-02 DIAGNOSIS — G90.A POSTURAL ORTHOSTATIC TACHYCARDIA SYNDROME: ICD-10-CM

## 2022-05-02 DIAGNOSIS — Z01.818 PREOP GENERAL PHYSICAL EXAM: Primary | ICD-10-CM

## 2022-05-02 DIAGNOSIS — M51.369 DDD (DEGENERATIVE DISC DISEASE), LUMBAR: ICD-10-CM

## 2022-05-02 DIAGNOSIS — Q79.62 EHLERS-DANLOS SYNDROME TYPE III: ICD-10-CM

## 2022-05-02 DIAGNOSIS — F33.41 RECURRENT MAJOR DEPRESSIVE DISORDER, IN PARTIAL REMISSION (H): ICD-10-CM

## 2022-05-02 DIAGNOSIS — N80.9 ENDOMETRIOSIS: ICD-10-CM

## 2022-05-02 LAB — HCG UR QL: NEGATIVE

## 2022-05-02 PROCEDURE — 99214 OFFICE O/P EST MOD 30 MIN: CPT | Performed by: PHYSICIAN ASSISTANT

## 2022-05-02 PROCEDURE — 81025 URINE PREGNANCY TEST: CPT | Performed by: PHYSICIAN ASSISTANT

## 2022-05-02 NOTE — PROGRESS NOTES
[address], [address]  [facility] [address]  Phone: [phone]  Primary Provider: Wendi Fernandez  Pre-op Performing Provider: BENJI CORMIER      PREOPERATIVE EVALUATION:  Today's date: 5/2/2022    Do Hassan is a 32 year old female who presents for a preoperative evaluation.    Surgical Information:  Surgery/Procedure: Larparoscopy  Surgery Location: [facility]  Surgeon: Dr. Freeman  Surgery Date: 5/10/22  Time of Surgery: 1325  Where patient plans to recover: At home with family  Fax number for surgical facility: Note does not need to be faxed, will be available electronically in Epic.    Type of Anesthesia Anticipated: to be determined    Assessment & Plan     The proposed surgical procedure is considered INTERMEDIATE risk.    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     Tylenol is safe to take prior to surgery.    Take prozac, propranolol and sodium chloride with a sip of water the am of surgery.    Skip vitamins and supplements the am of  surgery.    Don't take medical cannabis or hydrocodone the am of surgery.    RECOMMENDATION:  APPROVAL GIVEN to proceed with proposed procedure, without further diagnostic evaluation.    Jennifer Barahona PA-C  30 minutes on the day of the encounter doing chart review, history and exam, documentation and further activities as noted above.      Subjective     HPI related to upcoming procedure:   She has been having pelvic pain x 10 years. She has endometriosis and will have laparoscopy surgery on 5/10/22.    She has POTS and takes sodium chloride and propranolol for her symptoms.    She walks regularly for her job.  She is a  at the hospital and walks all day for her job.    She denies cardiac history or history of blood clots.  Preop Questions 4/26/2022   1. Have you ever had a heart attack or stroke? No   2. Have you ever had surgery on your heart or blood vessels, such as a stent placement, a coronary artery bypass, or surgery on an artery in your head, neck, heart, or legs? No   3. Do you have chest pain with activity? No   4. Do you have a history of  heart failure? No   5. Do you currently have a cold, bronchitis or symptoms of other infection? No   6. Do you have a cough, shortness of breath, or wheezing? No   7. Do you or anyone in your family have previous history of blood clots? No   8. Do you or does anyone in your family have a serious bleeding problem such as prolonged bleeding following surgeries or cuts? No   9. Have you ever had problems with anemia or been told to take iron pills? YES -    10. Have you had any abnormal blood loss such as black, tarry or bloody stools, or abnormal vaginal bleeding? No   11. Have you ever had a blood transfusion? No   12. Are you willing to have a blood transfusion if it is medically needed before, during, or after your surgery? YES   13. Have you or any of your relatives ever had problems with anesthesia? No   14. Do you have sleep apnea,  excessive snoring or daytime drowsiness? No   15. Do you have any artifical heart valves or other implanted medical devices like a pacemaker, defibrillator, or continuous glucose monitor? No   16. Do you have artificial joints? YES    17. Are you allergic to latex? No   18. Is there any chance that you may be pregnant? No       Health Care Directive:  Patient does not have a Health Care Directive or Living Will: Patient states has Advance Directive and will bring in a copy to clinic.      Review of Systems  CONSTITUTIONAL: NEGATIVE for fever, chills, change in weight  INTEGUMENTARY/SKIN: NEGATIVE for worrisome rashes, moles or lesions  EYES: NEGATIVE for vision changes or irritation  ENT/MOUTH: NEGATIVE for ear, mouth and throat problems  RESP: NEGATIVE for significant cough or SOB  CV: NEGATIVE for chest pain, palpitations or peripheral edema  GI: POSITIVE for chronic pelvic pain and nausea, NEGATIVE for vomiting   : NEGATIVE for frequency, dysuria, or hematuria  MUSCULOSKELETAL: NEGATIVE for significant arthralgias or myalgia  NEURO: NEGATIVE for weakness, dizziness or paresthesias  ENDOCRINE: NEGATIVE for temperature intolerance, skin/hair changes  HEME: NEGATIVE for bleeding problems    Patient Active Problem List    Diagnosis Date Noted     Medical marijuana use 01/18/2022     Priority: Medium     Recurrent major depressive disorder, in partial remission (H) 01/18/2022     Priority: Medium     Asthma 01/18/2022     Priority: Medium     Intussusception (H) 03/25/2021     Priority: Medium     DDD (degenerative disc disease), lumbar 03/25/2021     Priority: Medium     Scoliosis 03/25/2021     Priority: Medium     Endometriosis 02/11/2017     Priority: Medium     POTS (postural orthostatic tachycardia syndrome) 10/30/2009     Priority: Medium     Óscar-Danlos syndrome type III 12/16/1996     Priority: Medium      Past Medical History:   Diagnosis Date     Anxiety      Arthritis      Depression      Óscar-Danlos  syndrome      Endometriosis      Endometriosis 02/2017    evasion of endometriosis     Heartburn      Intussusception (H) 1996     Osteoporosis      POTS (postural orthostatic tachycardia syndrome)      Spina bifida occulta      Substance abuse (H)      Past Surgical History:   Procedure Laterality Date     anterior posterior spinal fusion  2008    L5-S1     APPENDECTOMY       CYSTECTOMY OVARIAN BENIGN  2020    left     DAVINCI ASSISTED ABLATION / EXCISION OF ENDOMETRIOSIS  2017     ORTHOPEDIC SURGERY       Current Outpatient Medications   Medication Sig Dispense Refill     famotidine (PEPCID) 40 MG tablet Take 40 mg by mouth daily       FLUoxetine (PROZAC) 40 MG capsule Take 1 capsule (40 mg) by mouth daily 90 capsule 3     fluticasone (FLONASE) 50 MCG/ACT nasal spray SHAKE LIQUID AND USE 1 SPRAY IN EACH NOSTRIL DAILY 16 g 0     guaiFENesin 1200 MG TB12 Take 1 tablet (1,200 mg) by mouth 2 times daily 60 tablet 0     HYDROcodone-acetaminophen (NORCO) 5-325 MG tablet Take 1 tablet by mouth every 6 hours as needed for pain 12 tablet 0     magnesium citrate solution Take 296 mLs by mouth once as needed for constipation 296 mL 0     propranolol (INDERAL) 10 MG tablet Take 1 tablet (10 mg) by mouth 3 times daily 90 tablet 3     psyllium (METAMUCIL/KONSYL) capsule Take 1 capsule by mouth daily 90 capsule 3     SENNA-docusate sodium (SENNA S) 8.6-50 MG tablet Take 1 tablet by mouth At Bedtime 30 tablet 0     sodium chloride 1 GM tablet Take 1 mg by mouth daily       traZODone (DESYREL) 50 MG tablet Take 0.5-1 tablets (25-50 mg) by mouth At Bedtime 30 tablet 3       Allergies   Allergen Reactions     Contrast Dye Nausea        Social History     Tobacco Use     Smoking status: Current Some Day Smoker     Smokeless tobacco: Former User     Quit date: 1/1/2012   Substance Use Topics     Alcohol use: Not Currently     Comment: sober since age 19     Family History   Problem Relation Age of Onset     Arthritis Mother          "hypothyroidism     Anorexia nervosa Mother      Heart Defect Mother         Leaky valve; possible heart failure related to anorexia     Constipation Father         Ashkenazi     Eating Disorder Sister      Hypothyroidism Maternal Grandmother      Osteoporosis Maternal Grandmother      Cancer Maternal Grandfather      Dementia Paternal Grandmother      Cerebrovascular Disease Paternal Grandfather      Alcoholism Paternal Grandfather      History   Drug Use Unknown         Objective     LMP 04/10/2022      /68   Pulse 74   Temp 99.2  F (37.3  C) (Oral)   Resp 16   Ht 1.626 m (5' 4\")   Wt 70 kg (154 lb 6.4 oz)   LMP 04/10/2022   SpO2 96%   BMI 26.50 kg/m        Physical Exam    GENERAL APPEARANCE: healthy, alert and no distress     EYES: no scleral icterus     HENT: OP clear mouth without ulcers or lesions     NECK: supple, no bruits     RESP: lungs clear to auscultation - no rales, rhonchi or wheezes     CV: regular rates and rhythm, normal S1 S2, no S3 or S4 and no murmur, click or rub     ABDOMEN:  soft, nontender, no HSM or masses and bowel sounds normal     MS: extremities normal- no gross deformities noted, no edema     NEURO: Normal mentation, gait and speechnormal     PSYCH: mentation appears normal. and affect normal/bright      Recent Labs   Lab Test 04/15/22  1029 04/12/22  1517 03/24/22  0918 03/05/21  1633 08/02/20  1727   HGB 12.6 13.1 13.1   < > 13.6    281 253   < > 328   INR  --   --   --   --  1.06   NA  --  138 139   < > 137   POTASSIUM  --  4.0 3.7   < > 4.0   CR  --  0.68 0.77   < > 0.79    < > = values in this interval not displayed.        Diagnostics:  Recent labs reviewed and stable, HCG pending   No EKG required for low risk surgery (cataract, skin procedure, breast biopsy, etc).    Revised Cardiac Risk Index (RCRI):  The patient has the following serious cardiovascular risks for perioperative complications:   - No serious cardiac risks = 0 points     RCRI Interpretation: " 0 points: Class I (very low risk - 0.4% complication rate)           Signed Electronically by: Jennifer Barahnoa PA-C  Copy of this evaluation report is provided to requesting physician.

## 2022-05-02 NOTE — NURSING NOTE
"/68   Pulse 74   Resp 16   Ht 1.626 m (5' 4\")   Wt 70 kg (154 lb 6.4 oz)   LMP 04/10/2022   SpO2 96%   BMI 26.50 kg/m    Patient in for Pre-Op.  "

## 2022-05-02 NOTE — PATIENT INSTRUCTIONS
Stop all NSAIDs (motrin, ibuprofen, naproxen, aleve, advil, naprosyn, aspirin)  for at least 7 days prior to surgery.     Tylenol is safe to take prior to surgery.    Take prozac, propranolol and sodium chloride with a sip of water the am of surgery.    Skip vitamins and supplements the am of surgery.    Don't take medical cannabis or hydrocodone the am of surgery.

## 2022-05-03 NOTE — RESULT ENCOUNTER NOTE
Dear Do,     Here are your recent pregnancy test results which are negative.    Please let us know if you have any questions or concerns.    Regards,  Jennifer Barahona PA-C

## 2022-05-05 RX ORDER — NORETHINDRONE 0.35 MG/1
0.35 TABLET ORAL DAILY
COMMUNITY
Start: 2022-04-03

## 2022-05-06 ENCOUNTER — LAB (OUTPATIENT)
Dept: URGENT CARE | Facility: URGENT CARE | Age: 32
End: 2022-05-06
Attending: OBSTETRICS & GYNECOLOGY
Payer: COMMERCIAL

## 2022-05-06 DIAGNOSIS — Z11.59 ENCOUNTER FOR SCREENING FOR OTHER VIRAL DISEASES: ICD-10-CM

## 2022-05-06 PROCEDURE — U0003 INFECTIOUS AGENT DETECTION BY NUCLEIC ACID (DNA OR RNA); SEVERE ACUTE RESPIRATORY SYNDROME CORONAVIRUS 2 (SARS-COV-2) (CORONAVIRUS DISEASE [COVID-19]), AMPLIFIED PROBE TECHNIQUE, MAKING USE OF HIGH THROUGHPUT TECHNOLOGIES AS DESCRIBED BY CMS-2020-01-R: HCPCS

## 2022-05-06 PROCEDURE — U0005 INFEC AGEN DETEC AMPLI PROBE: HCPCS

## 2022-05-07 LAB — SARS-COV-2 RNA RESP QL NAA+PROBE: NEGATIVE

## 2022-05-10 ENCOUNTER — ANESTHESIA (OUTPATIENT)
Dept: SURGERY | Facility: CLINIC | Age: 32
End: 2022-05-10
Payer: COMMERCIAL

## 2022-05-10 ENCOUNTER — HOSPITAL ENCOUNTER (OUTPATIENT)
Facility: CLINIC | Age: 32
Discharge: HOME | End: 2022-05-10
Attending: OBSTETRICS & GYNECOLOGY | Admitting: OBSTETRICS & GYNECOLOGY
Payer: COMMERCIAL

## 2022-05-10 ENCOUNTER — ANESTHESIA EVENT (OUTPATIENT)
Dept: SURGERY | Facility: CLINIC | Age: 32
End: 2022-05-10
Payer: COMMERCIAL

## 2022-05-10 VITALS
SYSTOLIC BLOOD PRESSURE: 108 MMHG | OXYGEN SATURATION: 98 % | BODY MASS INDEX: 25.57 KG/M2 | TEMPERATURE: 97.6 F | HEIGHT: 64 IN | DIASTOLIC BLOOD PRESSURE: 67 MMHG | WEIGHT: 149.8 LBS | HEART RATE: 76 BPM | RESPIRATION RATE: 16 BRPM

## 2022-05-10 DIAGNOSIS — N80.9 ENDOMETRIOSIS: Primary | ICD-10-CM

## 2022-05-10 LAB
ABO/RH(D): NORMAL
ANTIBODY SCREEN: NEGATIVE
HCG UR QL: NEGATIVE
HGB BLD-MCNC: 13.2 G/DL (ref 11.7–15.7)
SPECIMEN EXPIRATION DATE: NORMAL

## 2022-05-10 PROCEDURE — 999N000141 HC STATISTIC PRE-PROCEDURE NURSING ASSESSMENT: Performed by: OBSTETRICS & GYNECOLOGY

## 2022-05-10 PROCEDURE — 250N000013 HC RX MED GY IP 250 OP 250 PS 637: Performed by: PHYSICIAN ASSISTANT

## 2022-05-10 PROCEDURE — 86901 BLOOD TYPING SEROLOGIC RH(D): CPT | Performed by: PHYSICIAN ASSISTANT

## 2022-05-10 PROCEDURE — 250N000009 HC RX 250: Performed by: OBSTETRICS & GYNECOLOGY

## 2022-05-10 PROCEDURE — 250N000011 HC RX IP 250 OP 636: Performed by: PHYSICIAN ASSISTANT

## 2022-05-10 PROCEDURE — 250N000009 HC RX 250: Performed by: NURSE ANESTHETIST, CERTIFIED REGISTERED

## 2022-05-10 PROCEDURE — 81025 URINE PREGNANCY TEST: CPT | Performed by: PHYSICIAN ASSISTANT

## 2022-05-10 PROCEDURE — 370N000017 HC ANESTHESIA TECHNICAL FEE, PER MIN: Performed by: OBSTETRICS & GYNECOLOGY

## 2022-05-10 PROCEDURE — 272N000001 HC OR GENERAL SUPPLY STERILE: Performed by: OBSTETRICS & GYNECOLOGY

## 2022-05-10 PROCEDURE — 250N000011 HC RX IP 250 OP 636: Performed by: NURSE ANESTHETIST, CERTIFIED REGISTERED

## 2022-05-10 PROCEDURE — 85018 HEMOGLOBIN: CPT | Performed by: PHYSICIAN ASSISTANT

## 2022-05-10 PROCEDURE — 258N000003 HC RX IP 258 OP 636: Performed by: ANESTHESIOLOGY

## 2022-05-10 PROCEDURE — 710N000009 HC RECOVERY PHASE 1, LEVEL 1, PER MIN: Performed by: OBSTETRICS & GYNECOLOGY

## 2022-05-10 PROCEDURE — 36415 COLL VENOUS BLD VENIPUNCTURE: CPT | Performed by: PHYSICIAN ASSISTANT

## 2022-05-10 PROCEDURE — 250N000013 HC RX MED GY IP 250 OP 250 PS 637: Performed by: ANESTHESIOLOGY

## 2022-05-10 PROCEDURE — 258N000003 HC RX IP 258 OP 636: Performed by: NURSE ANESTHETIST, CERTIFIED REGISTERED

## 2022-05-10 PROCEDURE — 250N000011 HC RX IP 250 OP 636: Performed by: ANESTHESIOLOGY

## 2022-05-10 PROCEDURE — 88305 TISSUE EXAM BY PATHOLOGIST: CPT | Mod: TC | Performed by: OBSTETRICS & GYNECOLOGY

## 2022-05-10 PROCEDURE — 360N000076 HC SURGERY LEVEL 3, PER MIN: Performed by: OBSTETRICS & GYNECOLOGY

## 2022-05-10 PROCEDURE — 710N000012 HC RECOVERY PHASE 2, PER MINUTE: Performed by: OBSTETRICS & GYNECOLOGY

## 2022-05-10 RX ORDER — FENTANYL CITRATE 50 UG/ML
25 INJECTION, SOLUTION INTRAMUSCULAR; INTRAVENOUS
Status: DISCONTINUED | OUTPATIENT
Start: 2022-05-10 | End: 2022-05-10 | Stop reason: HOSPADM

## 2022-05-10 RX ORDER — DEXAMETHASONE SODIUM PHOSPHATE 4 MG/ML
INJECTION, SOLUTION INTRA-ARTICULAR; INTRALESIONAL; INTRAMUSCULAR; INTRAVENOUS; SOFT TISSUE PRN
Status: DISCONTINUED | OUTPATIENT
Start: 2022-05-10 | End: 2022-05-10

## 2022-05-10 RX ORDER — CEFAZOLIN SODIUM/WATER 2 G/20 ML
2 SYRINGE (ML) INTRAVENOUS
Status: COMPLETED | OUTPATIENT
Start: 2022-05-10 | End: 2022-05-10

## 2022-05-10 RX ORDER — ACETAMINOPHEN 325 MG/1
975 TABLET ORAL ONCE
Status: COMPLETED | OUTPATIENT
Start: 2022-05-10 | End: 2022-05-10

## 2022-05-10 RX ORDER — PROPOFOL 10 MG/ML
INJECTION, EMULSION INTRAVENOUS PRN
Status: DISCONTINUED | OUTPATIENT
Start: 2022-05-10 | End: 2022-05-10

## 2022-05-10 RX ORDER — IBUPROFEN 800 MG/1
800 TABLET, FILM COATED ORAL ONCE
Status: DISCONTINUED | OUTPATIENT
Start: 2022-05-10 | End: 2022-05-10 | Stop reason: HOSPADM

## 2022-05-10 RX ORDER — CEFAZOLIN SODIUM/WATER 2 G/20 ML
2 SYRINGE (ML) INTRAVENOUS SEE ADMIN INSTRUCTIONS
Status: DISCONTINUED | OUTPATIENT
Start: 2022-05-10 | End: 2022-05-10 | Stop reason: HOSPADM

## 2022-05-10 RX ORDER — HYDROMORPHONE HCL IN WATER/PF 6 MG/30 ML
0.2 PATIENT CONTROLLED ANALGESIA SYRINGE INTRAVENOUS EVERY 5 MIN PRN
Status: DISCONTINUED | OUTPATIENT
Start: 2022-05-10 | End: 2022-05-10 | Stop reason: HOSPADM

## 2022-05-10 RX ORDER — FENTANYL CITRATE 50 UG/ML
INJECTION, SOLUTION INTRAMUSCULAR; INTRAVENOUS PRN
Status: DISCONTINUED | OUTPATIENT
Start: 2022-05-10 | End: 2022-05-10

## 2022-05-10 RX ORDER — SODIUM CHLORIDE, SODIUM LACTATE, POTASSIUM CHLORIDE, CALCIUM CHLORIDE 600; 310; 30; 20 MG/100ML; MG/100ML; MG/100ML; MG/100ML
INJECTION, SOLUTION INTRAVENOUS CONTINUOUS
Status: DISCONTINUED | OUTPATIENT
Start: 2022-05-10 | End: 2022-05-10 | Stop reason: HOSPADM

## 2022-05-10 RX ORDER — ACETAMINOPHEN 325 MG/1
975 TABLET ORAL ONCE
Status: DISCONTINUED | OUTPATIENT
Start: 2022-05-10 | End: 2022-05-10 | Stop reason: HOSPADM

## 2022-05-10 RX ORDER — LIDOCAINE HYDROCHLORIDE 10 MG/ML
INJECTION, SOLUTION INFILTRATION; PERINEURAL PRN
Status: DISCONTINUED | OUTPATIENT
Start: 2022-05-10 | End: 2022-05-10

## 2022-05-10 RX ORDER — LIDOCAINE 40 MG/G
CREAM TOPICAL
Status: DISCONTINUED | OUTPATIENT
Start: 2022-05-10 | End: 2022-05-10 | Stop reason: HOSPADM

## 2022-05-10 RX ORDER — OXYCODONE HYDROCHLORIDE 5 MG/1
5 TABLET ORAL EVERY 4 HOURS PRN
Status: DISCONTINUED | OUTPATIENT
Start: 2022-05-10 | End: 2022-05-10 | Stop reason: HOSPADM

## 2022-05-10 RX ORDER — BUPIVACAINE HYDROCHLORIDE 5 MG/ML
INJECTION, SOLUTION PERINEURAL PRN
Status: DISCONTINUED | OUTPATIENT
Start: 2022-05-10 | End: 2022-05-10 | Stop reason: HOSPADM

## 2022-05-10 RX ORDER — ONDANSETRON 2 MG/ML
INJECTION INTRAMUSCULAR; INTRAVENOUS PRN
Status: DISCONTINUED | OUTPATIENT
Start: 2022-05-10 | End: 2022-05-10

## 2022-05-10 RX ORDER — SODIUM CHLORIDE, SODIUM LACTATE, POTASSIUM CHLORIDE, CALCIUM CHLORIDE 600; 310; 30; 20 MG/100ML; MG/100ML; MG/100ML; MG/100ML
INJECTION, SOLUTION INTRAVENOUS CONTINUOUS PRN
Status: DISCONTINUED | OUTPATIENT
Start: 2022-05-10 | End: 2022-05-10

## 2022-05-10 RX ORDER — ONDANSETRON 4 MG/1
4 TABLET, ORALLY DISINTEGRATING ORAL EVERY 30 MIN PRN
Status: DISCONTINUED | OUTPATIENT
Start: 2022-05-10 | End: 2022-05-10 | Stop reason: HOSPADM

## 2022-05-10 RX ORDER — OXYCODONE HYDROCHLORIDE 5 MG/1
5-10 TABLET ORAL EVERY 4 HOURS PRN
Qty: 10 TABLET | Refills: 0 | Status: SHIPPED | OUTPATIENT
Start: 2022-05-10 | End: 2022-12-08

## 2022-05-10 RX ORDER — KETOROLAC TROMETHAMINE 30 MG/ML
INJECTION, SOLUTION INTRAMUSCULAR; INTRAVENOUS PRN
Status: DISCONTINUED | OUTPATIENT
Start: 2022-05-10 | End: 2022-05-10

## 2022-05-10 RX ORDER — FENTANYL CITRATE 50 UG/ML
25 INJECTION, SOLUTION INTRAMUSCULAR; INTRAVENOUS EVERY 5 MIN PRN
Status: DISCONTINUED | OUTPATIENT
Start: 2022-05-10 | End: 2022-05-10 | Stop reason: HOSPADM

## 2022-05-10 RX ORDER — ONDANSETRON 2 MG/ML
4 INJECTION INTRAMUSCULAR; INTRAVENOUS EVERY 30 MIN PRN
Status: DISCONTINUED | OUTPATIENT
Start: 2022-05-10 | End: 2022-05-10 | Stop reason: HOSPADM

## 2022-05-10 RX ORDER — MEPERIDINE HYDROCHLORIDE 25 MG/ML
12.5 INJECTION INTRAMUSCULAR; INTRAVENOUS; SUBCUTANEOUS
Status: DISCONTINUED | OUTPATIENT
Start: 2022-05-10 | End: 2022-05-10 | Stop reason: HOSPADM

## 2022-05-10 RX ADMIN — FENTANYL CITRATE 25 MCG: 0.05 INJECTION, SOLUTION INTRAMUSCULAR; INTRAVENOUS at 15:05

## 2022-05-10 RX ADMIN — FENTANYL CITRATE 25 MCG: 0.05 INJECTION, SOLUTION INTRAMUSCULAR; INTRAVENOUS at 15:57

## 2022-05-10 RX ADMIN — SODIUM CHLORIDE, POTASSIUM CHLORIDE, SODIUM LACTATE AND CALCIUM CHLORIDE: 600; 310; 30; 20 INJECTION, SOLUTION INTRAVENOUS at 13:10

## 2022-05-10 RX ADMIN — LIDOCAINE HYDROCHLORIDE 30 MG: 10 INJECTION, SOLUTION INFILTRATION; PERINEURAL at 13:40

## 2022-05-10 RX ADMIN — Medication 2 G: at 13:35

## 2022-05-10 RX ADMIN — PROPOFOL 50 MCG/KG/MIN: 10 INJECTION, EMULSION INTRAVENOUS at 13:45

## 2022-05-10 RX ADMIN — ONDANSETRON HYDROCHLORIDE 4 MG: 2 INJECTION, SOLUTION INTRAVENOUS at 14:25

## 2022-05-10 RX ADMIN — ROCURONIUM BROMIDE 30 MG: 50 INJECTION, SOLUTION INTRAVENOUS at 13:40

## 2022-05-10 RX ADMIN — MIDAZOLAM 2 MG: 1 INJECTION INTRAMUSCULAR; INTRAVENOUS at 13:35

## 2022-05-10 RX ADMIN — FENTANYL CITRATE 50 MCG: 50 INJECTION, SOLUTION INTRAMUSCULAR; INTRAVENOUS at 14:07

## 2022-05-10 RX ADMIN — FENTANYL CITRATE 25 MCG: 0.05 INJECTION, SOLUTION INTRAMUSCULAR; INTRAVENOUS at 14:58

## 2022-05-10 RX ADMIN — DEXAMETHASONE SODIUM PHOSPHATE 4 MG: 4 INJECTION, SOLUTION INTRA-ARTICULAR; INTRALESIONAL; INTRAMUSCULAR; INTRAVENOUS; SOFT TISSUE at 13:40

## 2022-05-10 RX ADMIN — KETOROLAC TROMETHAMINE 30 MG: 30 INJECTION, SOLUTION INTRAMUSCULAR at 14:29

## 2022-05-10 RX ADMIN — SUGAMMADEX 200 MG: 100 INJECTION, SOLUTION INTRAVENOUS at 14:28

## 2022-05-10 RX ADMIN — ONDANSETRON 4 MG: 2 INJECTION INTRAMUSCULAR; INTRAVENOUS at 16:07

## 2022-05-10 RX ADMIN — FENTANYL CITRATE 100 MCG: 50 INJECTION, SOLUTION INTRAMUSCULAR; INTRAVENOUS at 13:40

## 2022-05-10 RX ADMIN — PROPOFOL 200 MG: 10 INJECTION, EMULSION INTRAVENOUS at 13:40

## 2022-05-10 RX ADMIN — OXYCODONE HYDROCHLORIDE 5 MG: 5 TABLET ORAL at 16:16

## 2022-05-10 RX ADMIN — ACETAMINOPHEN 975 MG: 325 TABLET, FILM COATED ORAL at 12:52

## 2022-05-10 RX ADMIN — SODIUM CHLORIDE, POTASSIUM CHLORIDE, SODIUM LACTATE AND CALCIUM CHLORIDE: 600; 310; 30; 20 INJECTION, SOLUTION INTRAVENOUS at 14:10

## 2022-05-10 NOTE — OP NOTE
Procedure Date: 05/10/2022    PREOPERATIVE DIAGNOSES:  Pelvic pain, endometriosis.    POSTOPERATIVE DIAGNOSES:  Pelvic adhesions, endometriosis, pelvic varices.    PROCEDURE:  Laparoscopy with lysis of adhesions, excision of endometriosis.    SURGEON:  Terry Freeman MD    FIRST ASSISTANT:  Ibis Lehman PA-C    ANESTHESIA:  General.    BLOOD LOSS:  Less than 10 mL.    COMPLICATIONS:  None.    INDICATIONS AND CONSENT:  This patient is a 32-year-old with a history of known endometriosis.  The patient has  persistent pelvic pain.  The patient's last surgery was in 2017.  The patient had failed conservative measures and was offered a surgical evaluation, including possible excision of endometriosis.  The patient has possible pelvic varices and has been evaluated by interventional radiology.  The patient has been offered a laparoscopy.  The benefits and risks have been discussed including risks of bleeding, infection, injury to bowels, bladder, vascular system and ureter, possible life-threatening complications, including death, and clear informed consent was obtained.    DESCRIPTION OF PROCEDURE:  The patient was taken to the operating room, where general anesthesia was administered.  She was placed in dorsal lithotomy position with the arms tucked.  The abdomen, perineum and vagina were prepped and draped in the usual sterile fashion.  The bladder was sterilely drained.  A Hulka tenaculum was inserted into the uterus to serve as a uterine manipulator.  Attention was then turned to the abdomen, where direct insertion of a 5 mm umbilical trocar was carried out and the abdomen was insufflated.  The suprapubic puncture was placed, and the left lower quadrant puncture placed.  Examination revealed some filmy adhesions between the lower uterine segment and the bladder.  Right fallopian tube and ovary were normal-appearing.  There was a very prominent-appearing vasculature of the ovarian veins.  The posterior  cul-de-sac was normal in appearance.  There was ovary adherent to the anterior left fundus. There was a normal-appearing left fallopian tube.  There were adhesions between sigmoid colon and the pelvic sidewall.  The appendix was surgically absent in the area of the right ovarian vein.  There was endometriosis and adhesion between bowel and this ovarian vein that appears somewhat constricting.  Adhesiolysis was then carried out, excising the adhesions from the bladder to the anterior uterus and excising and freeing the ovary from the uterus.  Adhesions to the bowel were then meticulously and carefully, sharply excised.  In freeing the bowel from the left pelvic sidewall, the left ovarian vessels were markedly dilated.  There was also some constriction with endometriosis and adhesion between the lower sigmoid colon and the ovarian vasculature.  This very careful, meticulous dissection was carried out, freeing this adhesion.  The attention was then turned to the endometriosis implant on the right ovarian vessels and between those and the bowel.  This was carefully, sharply excised and sent for permanent pathologic evaluation.  The pelvis was then carefully, again, inspected.  The ureters were coursing normally.  The bowel was normal in appearance.  There was no intraabdominal bleeding.  The ovary on the left was restored to its normal anatomic positioning.  No residual areas of endometriosis were visualized.  The abdomen was then deflated.  The ports were removed under direct vision and the incisions were closed with 4-0 Monocryl and injected with 0.5% plain Marcaine.  All sponge and instrument counts were reported as correct.  There were no complications of this procedure.    Terry Freeman MD        D: 05/10/2022   T: 05/10/2022   MT: NICOLE    Name:     RAYMUNDO PARSONS  MRN:      [id number]        Account:        [id number]   :      1990           Procedure Date: 05/10/2022     Document: [id number]

## 2022-05-10 NOTE — ANESTHESIA POSTPROCEDURE EVALUATION
Patient: Do Hassan    Procedure: Procedure(s):  LAPAROSCOPY, Multiport, excision endometriosis, lysis of adhesions       Anesthesia Type:  General    Note:  Disposition: Outpatient   Postop Pain Control: Uneventful            Sign Out: Well controlled pain   PONV: No   Neuro/Psych: Uneventful            Sign Out: Acceptable/Baseline neuro status   Airway/Respiratory: Uneventful            Sign Out: Acceptable/Baseline resp. status   CV/Hemodynamics: Uneventful            Sign Out: Acceptable CV status; No obvious hypovolemia; No obvious fluid overload   Other NRE: NONE   DID A NON-ROUTINE EVENT OCCUR? No           Last vitals:  Vitals Value Taken Time   /62 05/10/22 1500   Temp 97.8  F (36.6  C) 05/10/22 1450   Pulse 87 05/10/22 1504   Resp 9 05/10/22 1504   SpO2 92 % 05/10/22 1511   Vitals shown include unvalidated device data.    Electronically Signed By: Alexander Irizarry MD  May 10, 2022  3:13 PM

## 2022-05-10 NOTE — DISCHARGE INSTRUCTIONS
DR. JENELLE NICOLE M.D.   CLINIC PHONE NUMBER:  [phone].  [facility]        GENERAL ANESTHESIA OR SEDATION ADULT DISCHARGE INSTRUCTIONS   SPECIAL PRECAUTIONS FOR 24 HOURS AFTER SURGERY    IT IS NOT UNUSUAL TO FEEL LIGHT-HEADED OR FAINT, UP TO 24 HOURS AFTER SURGERY OR WHILE TAKING PAIN MEDICATION.  IF YOU HAVE THESE SYMPTOMS; SIT FOR A FEW MINUTES BEFORE STANDING AND HAVE SOMEONE ASSIST YOU WHEN YOU GET UP TO WALK OR USE THE BATHROOM.    YOU SHOULD REST AND RELAX FOR THE NEXT 24 HOURS AND YOU MUST MAKE ARRANGEMENTS TO HAVE SOMEONE STAY WITH YOU FOR AT LEAST 24 HOURS AFTER YOUR DISCHARGE.  AVOID HAZARDOUS AND STRENUOUS ACTIVITIES.  DO NOT MAKE IMPORTANT DECISIONS FOR 24 HOURS.    DO NOT DRIVE ANY VEHICLE OR OPERATE MECHANICAL EQUIPMENT FOR 24 HOURS FOLLOWING THE END OF YOUR SURGERY.  EVEN THOUGH YOU MAY FEEL NORMAL, YOUR REACTIONS MAY BE AFFECTED BY THE MEDICATION YOU HAVE RECEIVED.    DO NOT DRINK ALCOHOLIC BEVERAGES FOR 24 HOURS FOLLOWING YOUR SURGERY.    DRINK CLEAR LIQUIDS (APPLE JUICE, GINGER ALE, 7-UP, BROTH, ETC.).  PROGRESS TO YOUR REGULAR DIET AS YOU FEEL ABLE.    YOU MAY HAVE A DRY MOUTH, A SORE THROAT, MUSCLES ACHES OR TROUBLE SLEEPING.  THESE SHOULD GO AWAY AFTER 24 HOURS.    CALL YOUR DOCTOR FOR ANY OF THE FOLLOWING:  SIGNS OF INFECTION (FEVER, GROWING TENDERNESS AT THE SURGERY SITE, A LARGE AMOUNT OF DRAINAGE OR BLEEDING, SEVERE PAIN, FOUL-SMELLING DRAINAGE, REDNESS OR SWELLING.    IT HAS BEEN OVER 8 TO 10 HOURS SINCE SURGERY AND YOU ARE STILL NOT ABLE TO URINATE (PASS WATER).       You received Tylenol 975mg at 1pm. Next dose of Tylenol after 7pm if needed.  Maximum acetaminophen (Tylenol) dose from all sources should not exceed 4 grams (4000 mg) per day.    You received Toradol, an IV form of Ibuprofen (Motrin) at 2:30pm.  Do not take any Ibuprofen products until 8:30pm.    You received Oxycodone 5mg at 4:15pm. Next dose of Oxycodone after 8:15pm if needed.

## 2022-05-10 NOTE — ANESTHESIA PROCEDURE NOTES
Airway       Patient location during procedure: OR  Staff -        CRNA: Beata Jimenez APRN CRNA       Performed By: CRNA  Consent for Airway        Urgency: elective  Indications and Patient Condition       Indications for airway management: marielena-procedural       Induction type:intravenous       Mask difficulty assessment: 1 - vent by mask    Final Airway Details       Final airway type: endotracheal airway       Successful airway: ETT - single and Oral  Endotracheal Airway Details        ETT size (mm): 7.0       Cuffed: yes       Successful intubation technique: direct laryngoscopy       DL Blade Type: Hopkins 2       Grade View of Cords: 1       Adjucts: stylet       Position: Right       Measured from: gums/teeth       Secured at (cm): 22       Bite block used: Soft    Post intubation assessment        Placement verified by: capnometry, equal breath sounds and chest rise        Number of attempts at approach: 1       Number of other approaches attempted: 0       Secured with: plastic tape       Ease of procedure: easy       Dentition: Intact and Unchanged

## 2022-05-10 NOTE — ANESTHESIA CARE TRANSFER NOTE
Patient: Do Hassan    Procedure: Procedure(s):  LAPAROSCOPY, Multiport, excision endometriosis, lysis of adhesions       Diagnosis: Pain, abdominal [R10.9]  Endometriosis [N80.9]  Pelvic pain [R10.2]  Diagnosis Additional Information: No value filed.    Anesthesia Type:   General     Note:    Oropharynx: oropharynx clear of all foreign objects  Level of Consciousness: drowsy  Oxygen Supplementation: face mask  Level of Supplemental Oxygen (L/min / FiO2): 6  Independent Airway: airway patency satisfactory and stable  Dentition: dentition unchanged  Vital Signs Stable: post-procedure vital signs reviewed and stable  Report to RN Given: handoff report given  Patient transferred to: PACU    Handoff Report: Identifed the Patient, Identified the Reponsible Provider, Reviewed the pertinent medical history, Discussed the surgical course, Reviewed Intra-OP anesthesia mangement and issues during anesthesia, Set expectations for post-procedure period and Allowed opportunity for questions and acknowledgement of understanding      Vitals:  Vitals Value Taken Time   /84 05/10/22 1445   Temp     Pulse 73 05/10/22 1446   Resp 8 05/10/22 1447   SpO2 99 % 05/10/22 1447   Vitals shown include unvalidated device data.    Electronically Signed By: VERENA Newberry CRNA  May 10, 2022  2:48 PM

## 2022-05-10 NOTE — BRIEF OP NOTE
[facility] Brief Operative Note    Pre-operative diagnosis: Pain, abdominal [R10.9]  Endometriosis [N80.9]  Pelvic pain [R10.2]   Post-operative diagnosis Endometriosis and adhesions   Procedure: Procedure(s):  LAPAROSCOPY, Multiport, excision endometriosis, lysis of adhesions   Surgeon(s): Surgeon(s) and Role:     * Terry Freeman MD - Primary     * Ibis Lehman PA-C - Assisting   Estimated blood loss: 5 mL    Specimens: ID Type Source Tests Collected by Time Destination   1 : peritoneal endometrial lesion Tissue Peritoneum SURGICAL PATHOLOGY EXAM Terry Freeman MD 5/10/2022  2:24 PM       Findings:

## 2022-05-10 NOTE — ANESTHESIA PREPROCEDURE EVALUATION
Anesthesia Pre-Procedure Evaluation    Patient: Do Hassan   MRN: [id number] : 1990        Procedure : Procedure(s):  LAPAROSCOPY, Multiport          Past Medical History:   Diagnosis Date     Anxiety      Arthritis     spine     Depression      Óscar-Danlos syndrome      Endometriosis      Endometriosis 2017    evasion of endometriosis     Heartburn      Intussusception (H)      Osteoporosis      POTS (postural orthostatic tachycardia syndrome)      Spina bifida occulta      Substance abuse (H)       Past Surgical History:   Procedure Laterality Date     anterior posterior spinal fusion      L5-S1     APPENDECTOMY       CYSTECTOMY OVARIAN BENIGN  2020    left     DAVINCI ASSISTED ABLATION / EXCISION OF ENDOMETRIOSIS  2017     ORTHOPEDIC SURGERY Right     right hip labial repair      Allergies   Allergen Reactions     Contrast Dye Nausea      Social History     Tobacco Use     Smoking status: Former Smoker     Quit date: 2022     Years since quittin.0     Smokeless tobacco: Former User     Quit date: 2012   Substance Use Topics     Alcohol use: Not Currently     Comment: sober since age 19      Wt Readings from Last 1 Encounters:   05/10/22 67.9 kg (149 lb 12.8 oz)        Anesthesia Evaluation   Pt has had prior anesthetic. Type: General.    No history of anesthetic complications       ROS/MED HX  ENT/Pulmonary:     (+) Mild Persistent, asthma     Neurologic:  - neg neurologic ROS     Cardiovascular:  - neg cardiovascular ROS     METS/Exercise Tolerance:     Hematologic:  - neg hematologic  ROS     Musculoskeletal:  - neg musculoskeletal ROS     GI/Hepatic:  - neg GI/hepatic ROS     Renal/Genitourinary:  - neg Renal ROS     Endo:  - neg endo ROS     Psychiatric/Substance Use:  - neg psychiatric ROS     Infectious Disease:  - neg infectious disease ROS     Malignancy:       Other:            Physical Exam    Airway        Mallampati: II   TM distance: > 3 FB   Neck ROM: full    Mouth opening: > 3 cm    Respiratory Devices and Support         Dental  no notable dental history         Cardiovascular   cardiovascular exam normal          Pulmonary   pulmonary exam normal                OUTSIDE LABS:  CBC:   Lab Results   Component Value Date    WBC 5.2 04/15/2022    WBC 7.0 04/12/2022    HGB 13.2 05/10/2022    HGB 12.6 04/15/2022    HCT 37.6 04/15/2022    HCT 38.6 04/12/2022     04/15/2022     04/12/2022     BMP:   Lab Results   Component Value Date     04/12/2022     03/24/2022    POTASSIUM 4.0 04/12/2022    POTASSIUM 3.7 03/24/2022    CHLORIDE 106 04/12/2022    CHLORIDE 107 03/24/2022    CO2 25 04/12/2022    CO2 27 03/24/2022    BUN 10 04/12/2022    BUN 11 03/24/2022    CR 0.68 04/12/2022    CR 0.77 03/24/2022     (H) 04/12/2022    GLC 76 03/24/2022     COAGS:   Lab Results   Component Value Date    INR 1.06 08/02/2020     POC:   Lab Results   Component Value Date    HCG Negative 05/02/2022    HCGS Negative 04/12/2022     HEPATIC:   Lab Results   Component Value Date    ALBUMIN 3.9 04/12/2022    PROTTOTAL 7.8 04/12/2022    ALT 26 04/12/2022    AST 19 04/12/2022    ALKPHOS 50 04/12/2022    BILITOTAL 0.2 04/12/2022     OTHER:   Lab Results   Component Value Date    LACT 0.6 (L) 04/12/2022    GABBY 8.8 04/12/2022    LIPASE 142 04/12/2022    TSH 0.71 03/24/2022    CRP <2.9 08/02/2020       Anesthesia Plan    ASA Status:  2      Anesthesia Type: General.     - Airway: ETT   Induction: Intravenous.   Maintenance: Balanced.        Consents    Anesthesia Plan(s) and associated risks, benefits, and realistic alternatives discussed. Questions answered and patient/representative(s) expressed understanding.    - Discussed:     - Discussed with:  Patient      - Extended Intubation/Ventilatory Support Discussed: No.      - Patient is DNR/DNI Status: No    Use of blood products discussed: No .     Postoperative Care    Pain management: IV analgesics, Oral pain medications,  Multi-modal analgesia.   PONV prophylaxis: Ondansetron (or other 5HT-3), Dexamethasone or Solumedrol     Comments:                Alexander Irizarry MD

## 2022-05-11 LAB
ALBUMIN SERPL-MCNC: 3.5 G/DL (ref 3.4–5)
ALP SERPL-CCNC: 44 U/L (ref 40–150)
ALT SERPL W P-5'-P-CCNC: 16 U/L (ref 0–50)
ANION GAP SERPL CALCULATED.3IONS-SCNC: 5 MMOL/L (ref 3–14)
AST SERPL W P-5'-P-CCNC: 12 U/L (ref 0–45)
BASOPHILS # BLD AUTO: 0 10E3/UL (ref 0–0.2)
BASOPHILS NFR BLD AUTO: 0 %
BILIRUB SERPL-MCNC: 0.5 MG/DL (ref 0.2–1.3)
BUN SERPL-MCNC: 6 MG/DL (ref 7–30)
CALCIUM SERPL-MCNC: 8.2 MG/DL (ref 8.5–10.1)
CHLORIDE BLD-SCNC: 104 MMOL/L (ref 94–109)
CO2 SERPL-SCNC: 27 MMOL/L (ref 20–32)
CREAT SERPL-MCNC: 0.72 MG/DL (ref 0.52–1.04)
EOSINOPHIL # BLD AUTO: 0.7 10E3/UL (ref 0–0.7)
EOSINOPHIL NFR BLD AUTO: 7 %
ERYTHROCYTE [DISTWIDTH] IN BLOOD BY AUTOMATED COUNT: 12.1 % (ref 10–15)
GFR SERPL CREATININE-BSD FRML MDRD: >90 ML/MIN/1.73M2
GLUCOSE BLD-MCNC: 103 MG/DL (ref 70–99)
HCT VFR BLD AUTO: 36.3 % (ref 35–47)
HGB BLD-MCNC: 12.3 G/DL (ref 11.7–15.7)
HOLD SPECIMEN: NORMAL
IMM GRANULOCYTES # BLD: 0 10E3/UL
IMM GRANULOCYTES NFR BLD: 0 %
LYMPHOCYTES # BLD AUTO: 2.8 10E3/UL (ref 0.8–5.3)
LYMPHOCYTES NFR BLD AUTO: 29 %
MCH RBC QN AUTO: 29.9 PG (ref 26.5–33)
MCHC RBC AUTO-ENTMCNC: 33.9 G/DL (ref 31.5–36.5)
MCV RBC AUTO: 88 FL (ref 78–100)
MONOCYTES # BLD AUTO: 0.5 10E3/UL (ref 0–1.3)
MONOCYTES NFR BLD AUTO: 6 %
NEUTROPHILS # BLD AUTO: 5.4 10E3/UL (ref 1.6–8.3)
NEUTROPHILS NFR BLD AUTO: 58 %
NRBC # BLD AUTO: 0 10E3/UL
NRBC BLD AUTO-RTO: 0 /100
PLATELET # BLD AUTO: 267 10E3/UL (ref 150–450)
POTASSIUM BLD-SCNC: 3.6 MMOL/L (ref 3.4–5.3)
PROT SERPL-MCNC: 6.9 G/DL (ref 6.8–8.8)
RBC # BLD AUTO: 4.12 10E6/UL (ref 3.8–5.2)
SODIUM SERPL-SCNC: 136 MMOL/L (ref 133–144)
WBC # BLD AUTO: 9.5 10E3/UL (ref 4–11)

## 2022-05-11 PROCEDURE — 99285 EMERGENCY DEPT VISIT HI MDM: CPT | Mod: 25

## 2022-05-11 PROCEDURE — 250N000013 HC RX MED GY IP 250 OP 250 PS 637: Performed by: EMERGENCY MEDICINE

## 2022-05-11 PROCEDURE — 250N000011 HC RX IP 250 OP 636: Performed by: EMERGENCY MEDICINE

## 2022-05-11 PROCEDURE — 96374 THER/PROPH/DIAG INJ IV PUSH: CPT | Mod: 59

## 2022-05-11 PROCEDURE — 36415 COLL VENOUS BLD VENIPUNCTURE: CPT | Performed by: EMERGENCY MEDICINE

## 2022-05-11 PROCEDURE — 96376 TX/PRO/DX INJ SAME DRUG ADON: CPT

## 2022-05-11 PROCEDURE — 80053 COMPREHEN METABOLIC PANEL: CPT | Performed by: EMERGENCY MEDICINE

## 2022-05-11 PROCEDURE — 85025 COMPLETE CBC W/AUTO DIFF WBC: CPT | Performed by: EMERGENCY MEDICINE

## 2022-05-11 RX ORDER — ONDANSETRON 2 MG/ML
4 INJECTION INTRAMUSCULAR; INTRAVENOUS ONCE
Status: COMPLETED | OUTPATIENT
Start: 2022-05-11 | End: 2022-05-11

## 2022-05-11 RX ORDER — ACETAMINOPHEN 500 MG
1000 TABLET ORAL ONCE
Status: COMPLETED | OUTPATIENT
Start: 2022-05-11 | End: 2022-05-11

## 2022-05-11 RX ADMIN — ONDANSETRON 4 MG: 2 INJECTION INTRAMUSCULAR; INTRAVENOUS at 20:38

## 2022-05-11 RX ADMIN — ACETAMINOPHEN 1000 MG: 500 TABLET, FILM COATED ORAL at 20:38

## 2022-05-12 ENCOUNTER — HOSPITAL ENCOUNTER (EMERGENCY)
Facility: CLINIC | Age: 32
Discharge: HOME | End: 2022-05-12
Attending: EMERGENCY MEDICINE | Admitting: EMERGENCY MEDICINE
Payer: COMMERCIAL

## 2022-05-12 ENCOUNTER — APPOINTMENT (OUTPATIENT)
Dept: CT IMAGING | Facility: CLINIC | Age: 32
End: 2022-05-12
Attending: EMERGENCY MEDICINE
Payer: COMMERCIAL

## 2022-05-12 VITALS
SYSTOLIC BLOOD PRESSURE: 99 MMHG | TEMPERATURE: 99.6 F | WEIGHT: 150 LBS | DIASTOLIC BLOOD PRESSURE: 64 MMHG | RESPIRATION RATE: 13 BRPM | BODY MASS INDEX: 25.61 KG/M2 | HEART RATE: 62 BPM | OXYGEN SATURATION: 95 % | HEIGHT: 64 IN

## 2022-05-12 DIAGNOSIS — G89.18 POSTOPERATIVE PAIN: ICD-10-CM

## 2022-05-12 DIAGNOSIS — N30.01 ACUTE CYSTITIS WITH HEMATURIA: ICD-10-CM

## 2022-05-12 LAB
ALBUMIN UR-MCNC: 30 MG/DL
APPEARANCE UR: ABNORMAL
BILIRUB UR QL STRIP: NEGATIVE
COLOR UR AUTO: ABNORMAL
GLUCOSE UR STRIP-MCNC: NEGATIVE MG/DL
HGB UR QL STRIP: ABNORMAL
KETONES UR STRIP-MCNC: NEGATIVE MG/DL
LEUKOCYTE ESTERASE UR QL STRIP: ABNORMAL
MUCOUS THREADS #/AREA URNS LPF: PRESENT /LPF
NITRATE UR QL: NEGATIVE
PATH REPORT.COMMENTS IMP SPEC: NORMAL
PATH REPORT.COMMENTS IMP SPEC: NORMAL
PATH REPORT.FINAL DX SPEC: NORMAL
PATH REPORT.GROSS SPEC: NORMAL
PATH REPORT.MICROSCOPIC SPEC OTHER STN: NORMAL
PATH REPORT.RELEVANT HX SPEC: NORMAL
PH UR STRIP: 6 [PH] (ref 5–7)
PHOTO IMAGE: NORMAL
RBC URINE: >182 /HPF
SP GR UR STRIP: 1.02 (ref 1–1.03)
SQUAMOUS EPITHELIAL: 2 /HPF
UROBILINOGEN UR STRIP-MCNC: NORMAL MG/DL
WBC CLUMPS #/AREA URNS HPF: PRESENT /HPF
WBC URINE: >182 /HPF

## 2022-05-12 PROCEDURE — 88305 TISSUE EXAM BY PATHOLOGIST: CPT | Mod: 26 | Performed by: PATHOLOGY

## 2022-05-12 PROCEDURE — 250N000013 HC RX MED GY IP 250 OP 250 PS 637: Performed by: EMERGENCY MEDICINE

## 2022-05-12 PROCEDURE — 258N000003 HC RX IP 258 OP 636: Performed by: EMERGENCY MEDICINE

## 2022-05-12 PROCEDURE — 250N000009 HC RX 250: Performed by: EMERGENCY MEDICINE

## 2022-05-12 PROCEDURE — 96361 HYDRATE IV INFUSION ADD-ON: CPT

## 2022-05-12 PROCEDURE — 81003 URINALYSIS AUTO W/O SCOPE: CPT | Performed by: EMERGENCY MEDICINE

## 2022-05-12 PROCEDURE — 96375 TX/PRO/DX INJ NEW DRUG ADDON: CPT

## 2022-05-12 PROCEDURE — 250N000011 HC RX IP 250 OP 636: Performed by: EMERGENCY MEDICINE

## 2022-05-12 PROCEDURE — 87086 URINE CULTURE/COLONY COUNT: CPT | Performed by: EMERGENCY MEDICINE

## 2022-05-12 PROCEDURE — 74177 CT ABD & PELVIS W/CONTRAST: CPT

## 2022-05-12 RX ORDER — ONDANSETRON 2 MG/ML
4 INJECTION INTRAMUSCULAR; INTRAVENOUS EVERY 30 MIN PRN
Status: DISCONTINUED | OUTPATIENT
Start: 2022-05-12 | End: 2022-05-12 | Stop reason: HOSPADM

## 2022-05-12 RX ORDER — CEFUROXIME AXETIL 500 MG/1
500 TABLET ORAL EVERY 12 HOURS SCHEDULED
Status: DISCONTINUED | OUTPATIENT
Start: 2022-05-12 | End: 2022-05-12

## 2022-05-12 RX ORDER — CEFUROXIME AXETIL 500 MG/1
500 TABLET ORAL 2 TIMES DAILY
Qty: 20 TABLET | Refills: 0 | Status: SHIPPED | OUTPATIENT
Start: 2022-05-12 | End: 2022-05-22

## 2022-05-12 RX ORDER — SODIUM CHLORIDE 9 MG/ML
INJECTION, SOLUTION INTRAVENOUS CONTINUOUS
Status: DISCONTINUED | OUTPATIENT
Start: 2022-05-12 | End: 2022-05-12 | Stop reason: HOSPADM

## 2022-05-12 RX ORDER — CEFUROXIME AXETIL 500 MG/1
500 TABLET ORAL ONCE
Status: COMPLETED | OUTPATIENT
Start: 2022-05-12 | End: 2022-05-12

## 2022-05-12 RX ORDER — IOPAMIDOL 755 MG/ML
75 INJECTION, SOLUTION INTRAVASCULAR ONCE
Status: COMPLETED | OUTPATIENT
Start: 2022-05-12 | End: 2022-05-12

## 2022-05-12 RX ORDER — HYDROMORPHONE HYDROCHLORIDE 1 MG/ML
0.5 INJECTION, SOLUTION INTRAMUSCULAR; INTRAVENOUS; SUBCUTANEOUS ONCE
Status: COMPLETED | OUTPATIENT
Start: 2022-05-12 | End: 2022-05-12

## 2022-05-12 RX ADMIN — CEFUROXIME AXETIL 500 MG: 500 TABLET ORAL at 03:40

## 2022-05-12 RX ADMIN — SODIUM CHLORIDE 62 ML: 900 INJECTION INTRAVENOUS at 02:04

## 2022-05-12 RX ADMIN — ONDANSETRON 4 MG: 2 INJECTION INTRAMUSCULAR; INTRAVENOUS at 01:20

## 2022-05-12 RX ADMIN — HYDROMORPHONE HYDROCHLORIDE 0.5 MG: 1 INJECTION, SOLUTION INTRAMUSCULAR; INTRAVENOUS; SUBCUTANEOUS at 03:40

## 2022-05-12 RX ADMIN — SODIUM CHLORIDE 1000 ML: 9 INJECTION, SOLUTION INTRAVENOUS at 01:20

## 2022-05-12 RX ADMIN — HYDROMORPHONE HYDROCHLORIDE 0.5 MG: 1 INJECTION, SOLUTION INTRAMUSCULAR; INTRAVENOUS; SUBCUTANEOUS at 01:24

## 2022-05-12 RX ADMIN — IOPAMIDOL 75 ML: 755 INJECTION, SOLUTION INTRAVENOUS at 02:04

## 2022-05-12 ASSESSMENT — ENCOUNTER SYMPTOMS
DYSURIA: 1
NAUSEA: 1
ABDOMINAL PAIN: 1
VOMITING: 0

## 2022-05-12 NOTE — ED TRIAGE NOTES
Had surgery for endometriosis yesterday at [facility], pain is uncontrollable. Took her 2 pills of Oxycodone 5mg at 1800. Pain to lower abdomen 10/10 and feels nauseated.     Triage Assessment     Row Name 05/11/22 2027       Triage Assessment (Adult)    Airway WDL WDL       Respiratory WDL    Respiratory WDL WDL       Skin Circulation/Temperature WDL    Skin Circulation/Temperature WDL WDL       Cardiac WDL    Cardiac WDL WDL       Peripheral/Neurovascular WDL    Peripheral Neurovascular WDL WDL       Cognitive/Neuro/Behavioral WDL    Cognitive/Neuro/Behavioral WDL WDL

## 2022-05-12 NOTE — ED PROVIDER NOTES
"  History   Chief Complaint:  Post-op Problem    The history is provided by the patient.      Do Hassan is a 32 year old female with history of Óscar-Danlos syndrome and endometriosis who presents with lower quadrant abdominal pain and nausea, worsening since yesterday. The patient underwent surgical treatment for endometriosis yesterday at [facility]. She has history of same surgery in the past but states the procedure yesterday was \"more extensive than they thought it would be.\" She was discharged to home on oxycodone for pain management, which she has been taking as directed (last 7 hours ago). She has additionally been taking Tylenol. Her symptoms were initially well controlled however today she reports significantly increased lower quadrant abdominal pain and nausea. She additionally notes extensive vaginal bleeding, noting that she is filling up adult diapers with blood every couple of hours. Overall worsening symptoms into this evening prompted her concern and presentation to ED at this time. She does additionally note some increased pedal edema along with dysuria and difficulty with flatulence. She denies any emesis.    Review of Systems   Cardiovascular: Positive for leg swelling (bilateral pedal edema).   Gastrointestinal: Positive for abdominal pain (lower quadrant) and nausea. Negative for vomiting.   Genitourinary: Positive for dysuria and vaginal bleeding.   All other systems reviewed and are negative.    Allergies:  Contrast Dye    Medications:  Fluoxetine  Omeprazole magnesium  Oxycodone  Propranolol  Senna docusate  Sodium chloride  Trazodone    Past Medical History:     Anxiety  Arthritis  Depression  Óscar-Danlos syndrome  Endometriosis  Heartburn  Intussusception  Osteoporosis  POTS  Spina bifida occulta  Substance abuse    Past Surgical History:    Anterior posterior spinal fusion  Appendectomy  Cystectomy ovarian benign, left   Davinci assisted ablation  Laparoscopy " "diagnostic  Right hip labial repair, right    Family History:    Hypothyroidism  Arthritis  Anorexia nervosa  Heart defection  Ashkenazi  Eating disorder    Social History:  The patient presented with her father.  The patient arrived in a private vehicle.    Physical Exam     Patient Vitals for the past 24 hrs:   BP Temp Temp src Pulse Resp SpO2 Height Weight   05/12/22 0400 99/64 -- -- 62 -- 95 % -- --   05/12/22 0345 102/68 -- -- 63 -- 98 % -- --   05/12/22 0235 -- -- -- -- -- 97 % -- --   05/12/22 0230 -- -- -- -- -- 96 % -- --   05/12/22 0225 -- -- -- -- -- 97 % -- --   05/12/22 0220 -- -- -- -- -- 97 % -- --   05/12/22 0215 95/54 -- -- 65 -- 96 % -- --   05/12/22 0100 94/54 -- -- 65 -- 93 % -- --   05/11/22 2025 107/53 99.6  F (37.6  C) Oral 74 13 96 % 1.626 m (5' 4\") 68 kg (150 lb)     Physical Exam  Constitutional:       General: She is not in acute distress.     Appearance: She is not diaphoretic.   HENT:      Head: Atraumatic.      Mouth/Throat:      Pharynx: No oropharyngeal exudate.   Eyes:      General: No scleral icterus.     Pupils: Pupils are equal, round, and reactive to light.   Cardiovascular:      Heart sounds: Normal heart sounds.   Pulmonary:      Effort: No respiratory distress.      Breath sounds: Normal breath sounds.   Abdominal:      General: Bowel sounds are normal.      Palpations: Abdomen is soft.      Tenderness: There is abdominal tenderness (mild diffuse tenderness).      Comments: Anterior abdominal incisions without dehiscence or bleeding   Musculoskeletal:         General: No tenderness.   Skin:     General: Skin is warm.      Findings: No rash.       Emergency Department Course     Imaging:  CT Abdomen Pelvis w Contrast   Final Result   IMPRESSION:    1.  Nonspecific heterogeneity of the uterus and adnexal regions is poorly evaluated on CT but likely related to postoperative change in this area. No significant hematoma. No definite CT findings for active bleeding to the limits of " the technique of the    exam.      2.  Moderate amount of free air in the abdomen. This would be in the expected range for reported history of surgery yesterday. Clinical correlation is needed as bowel perforation can have a similar appearance, however, no suspected sites of bowel    perforation based on today's CT scan. Follow-up as clinically warranted in this regard.      3.  Small to moderate amount of nonspecific free fluid in the pelvis. This is also suspected to be on a postoperative basis as well.        Report per radiology    Laboratory:  Labs Ordered and Resulted from Time of ED Arrival to Time of ED Departure   COMPREHENSIVE METABOLIC PANEL - Abnormal       Result Value    Sodium 136      Potassium 3.6      Chloride 104      Carbon Dioxide (CO2) 27      Anion Gap 5      Urea Nitrogen 6 (*)     Creatinine 0.72      Calcium 8.2 (*)     Glucose 103 (*)     Alkaline Phosphatase 44      AST 12      ALT 16      Protein Total 6.9      Albumin 3.5      Bilirubin Total 0.5      GFR Estimate >90     ROUTINE UA WITH MICROSCOPIC REFLEX TO CULTURE - Abnormal    Color Urine Brown (*)     Appearance Urine Slightly Cloudy (*)     Glucose Urine Negative      Bilirubin Urine Negative      Ketones Urine Negative      Specific Gravity Urine 1.021      Blood Urine Large (*)     pH Urine 6.0      Protein Albumin Urine 30  (*)     Urobilinogen Urine Normal      Nitrite Urine Negative      Leukocyte Esterase Urine Small (*)     WBC Clumps Urine Present (*)     Mucus Urine Present (*)     RBC Urine >182 (*)     WBC Urine >182 (*)     Squamous Epithelials Urine 2 (*)    CBC WITH PLATELETS AND DIFFERENTIAL    WBC Count 9.5      RBC Count 4.12      Hemoglobin 12.3      Hematocrit 36.3      MCV 88      MCH 29.9      MCHC 33.9      RDW 12.1      Platelet Count 267      % Neutrophils 58      % Lymphocytes 29      % Monocytes 6      % Eosinophils 7      % Basophils 0      % Immature Granulocytes 0      NRBCs per 100 WBC 0      Absolute  Neutrophils 5.4      Absolute Lymphocytes 2.8      Absolute Monocytes 0.5      Absolute Eosinophils 0.7      Absolute Basophils 0.0      Absolute Immature Granulocytes 0.0      Absolute NRBCs 0.0     URINE CULTURE     Emergency Department Course:     Reviewed:  I reviewed nursing notes, vitals, past medical history and Care Everywhere.    Assessments:  0105 I obtained history and examined the patient as noted above.   0258 I rechecked the patient and explained findings.  0320 I rechecked the patient and discussed with her my conversation with OB. I believe that she is safe for discharge at this time.    Consults:  0314 I spoke with Dr. Freeman, OB/GYN. They believe that she is safe for discharge with OB follow-up tomorrow.    Interventions:  2038 Zofran 4 mg IV  2038 Tylenol 1,000 mg PO  0120 Zofran 4 mg IV  0120 NS 1 L IV  0124 Dilaudid 0.5 mg IV  0340 Dilaudid 0.5 mg IV  0340 Ceftin 500 mg PO    Disposition:  The patient was discharged to home.     Impression & Plan     Medical Decision Making:  This patient presents to the ED for evaluation of abdominal pain.  She had surgery yesterday for endometriosis as well as lysis of adhesions.  She has been using oxycodone at home with minimal relief.  She denies any fever.  She is nauseous.  She underwent CT scan today.  There is some air and free fluid present.  I was able to speak with Dr. Freeman, her gynecology surgeon.  He feels that what is seen on the CT scan is within the realm of what would be expected at this point following her surgery.  The patient feels improved with pain management here in the ED.  She will continue to follow in their outpatient clinic.  She questionably has a urinary tract infection and will be started on Ceftin.      Diagnosis:    ICD-10-CM    1. Postoperative pain  G89.18    2. Acute cystitis with hematuria  N30.01      Discharge Medications:  Discharge Medication List as of 5/12/2022  4:02 AM      START taking these medications     Details   cefuroxime (CEFTIN) 500 MG tablet Take 1 tablet (500 mg) by mouth 2 times daily for 10 days, Disp-20 tablet, R-0, E-Prescribe           Scribe Disclosure:  I, Pat Pitts, am serving as a scribe at 12:54 AM on 5/12/2022 to document services personally performed by Massimo Mayorga MD based on my observations and the provider's statements to me.     Massimo Mayorga MD  05/12/22 0703

## 2022-05-12 NOTE — DISCHARGE INSTRUCTIONS
Call your gynecologist for follow-up in 1 to 2 days.    Return to the ER for fever, inability to take oral fluids, worsening pain, or any new concerns.

## 2022-05-13 LAB — BACTERIA UR CULT: NO GROWTH

## 2022-05-13 NOTE — RESULT ENCOUNTER NOTE
"Final urine culture report shows \"NO GROWTH\" and is NEGATIVE.  [facility] Emergency Dept discharge antibiotic: Ceftin  Recommendations in treatment per [facility] ED Lab result Urine culture protocol.  No change in treatment  "

## 2022-05-19 ENCOUNTER — VIRTUAL VISIT (OUTPATIENT)
Dept: FAMILY MEDICINE | Facility: CLINIC | Age: 32
End: 2022-05-19
Payer: COMMERCIAL

## 2022-05-19 DIAGNOSIS — U07.1 INFECTION DUE TO 2019 NOVEL CORONAVIRUS: Primary | ICD-10-CM

## 2022-05-19 PROCEDURE — 99214 OFFICE O/P EST MOD 30 MIN: CPT | Mod: 95 | Performed by: FAMILY MEDICINE

## 2022-05-19 NOTE — PROGRESS NOTES
Do is a 32 year old who is being evaluated via a billable video visit.      How would you like to obtain your AVS? MyChart  If the video visit is dropped, the invitation should be resent by: Text to cell phone: [phone]   Will anyone else be joining your video visit? No      Video Start Time: 10:00    Assessment & Plan     Infection due to 2019 novel coronavirus  Started sx since yesterday, found positive this morning  Has underlying health condition of asthma  Has normal renal function   Has been on selective serotonin reuptake inhibitor and trazodone  paxlovid was prescribed and have her to adjust the dose of trazodone 1/2   - nirmatrelvir and ritonavir (PAXLOVID) therapy pack; Take 3 tablets by mouth 2 times daily for 5 days Take 2 Nirmatrelvir tablets and 1 Ritonavir tablet twice daily for 5 days.           FUTURE APPOINTMENTS:       - Follow-up visit in 2 weeks with PCP if not improving     No follow-ups on file.    Brandon Ansari MD  [facility]   Do is a 32 year old who presents for the following health issues     HPI       COVID-19 Symptom Review  How many days ago did these symptoms start? Yesterday     Are any of the following symptoms significant for you?  New or worsening difficulty breathing? Yes    Please describe what kind of difficulty you are having breathing:Moderate dyspnea (short of breath with ADLs, shortness of breath that limits the ability to walk up one flight of stairs without needing to rest)    Worsening cough? Yes, I am coughing up mucus.    Fever or chills? No    Headache: YES    Sore throat: YES    Chest pain: no    Diarrhea: no    Body aches? no    What treatments has patient tried? Acetaminophen   Does patient live in a nursing home, group home, or shelter? no  Does patient have a way to get food/medications during quarantined? Yes, I have a friend or family member who can help me.                  Review of Systems   Constitutional,  HEENT, cardiovascular, pulmonary, gi and gu systems are negative, except as otherwise noted.      Objective           Vitals:  No vitals were obtained today due to virtual visit.    Physical Exam   GENERAL: Healthy, alert and no distress  EYES: Eyes grossly normal to inspection.  No discharge or erythema, or obvious scleral/conjunctival abnormalities.  RESP: No audible wheeze, cough, or visible cyanosis.  No visible retractions or increased work of breathing.    SKIN: Visible skin clear. No significant rash, abnormal pigmentation or lesions.  NEURO: Cranial nerves grossly intact.  Mentation and speech appropriate for age.  PSYCH: Mentation appears normal, affect normal/bright, judgement and insight intact, normal speech and appearance well-groomed.              Video-Visit Details    Type of service:  Video Visit    Video End Time:10:36 AM    Originating Location (pt. Location): Home    Distant Location (provider location):  [facility]     Platform used for Video Visit: iCouch

## 2022-07-12 ENCOUNTER — APPOINTMENT (OUTPATIENT)
Dept: ULTRASOUND IMAGING | Facility: CLINIC | Age: 32
End: 2022-07-12
Attending: EMERGENCY MEDICINE
Payer: COMMERCIAL

## 2022-07-12 ENCOUNTER — HOSPITAL ENCOUNTER (EMERGENCY)
Facility: CLINIC | Age: 32
Discharge: HOME | End: 2022-07-12
Attending: EMERGENCY MEDICINE | Admitting: EMERGENCY MEDICINE
Payer: COMMERCIAL

## 2022-07-12 VITALS
HEIGHT: 64 IN | WEIGHT: 150 LBS | HEART RATE: 75 BPM | SYSTOLIC BLOOD PRESSURE: 116 MMHG | RESPIRATION RATE: 16 BRPM | DIASTOLIC BLOOD PRESSURE: 81 MMHG | BODY MASS INDEX: 25.61 KG/M2 | OXYGEN SATURATION: 98 % | TEMPERATURE: 97.7 F

## 2022-07-12 DIAGNOSIS — G43.809 OTHER MIGRAINE WITHOUT STATUS MIGRAINOSUS, NOT INTRACTABLE: ICD-10-CM

## 2022-07-12 DIAGNOSIS — N80.9 ENDOMETRIOSIS: ICD-10-CM

## 2022-07-12 DIAGNOSIS — R10.30 ABDOMINAL PAIN, LOWER: ICD-10-CM

## 2022-07-12 LAB
ALBUMIN SERPL-MCNC: 3.9 G/DL (ref 3.4–5)
ALBUMIN UR-MCNC: NEGATIVE MG/DL
ALP SERPL-CCNC: 36 U/L (ref 40–150)
ALT SERPL W P-5'-P-CCNC: 22 U/L (ref 0–50)
ANION GAP SERPL CALCULATED.3IONS-SCNC: 6 MMOL/L (ref 3–14)
APPEARANCE UR: CLEAR
AST SERPL W P-5'-P-CCNC: <3 U/L (ref 0–45)
BASOPHILS # BLD AUTO: 0.1 10E3/UL (ref 0–0.2)
BASOPHILS NFR BLD AUTO: 1 %
BILIRUB SERPL-MCNC: 0.5 MG/DL (ref 0.2–1.3)
BILIRUB UR QL STRIP: NEGATIVE
BUN SERPL-MCNC: 11 MG/DL (ref 7–30)
CALCIUM SERPL-MCNC: 9.1 MG/DL (ref 8.5–10.1)
CHLORIDE BLD-SCNC: 107 MMOL/L (ref 94–109)
CO2 SERPL-SCNC: 26 MMOL/L (ref 20–32)
COLOR UR AUTO: ABNORMAL
CREAT SERPL-MCNC: 0.7 MG/DL (ref 0.52–1.04)
EOSINOPHIL # BLD AUTO: 0.3 10E3/UL (ref 0–0.7)
EOSINOPHIL NFR BLD AUTO: 5 %
ERYTHROCYTE [DISTWIDTH] IN BLOOD BY AUTOMATED COUNT: 11.9 % (ref 10–15)
GFR SERPL CREATININE-BSD FRML MDRD: >90 ML/MIN/1.73M2
GLUCOSE BLD-MCNC: 100 MG/DL (ref 70–99)
GLUCOSE UR STRIP-MCNC: NEGATIVE MG/DL
HCG UR QL: NEGATIVE
HCT VFR BLD AUTO: 38.2 % (ref 35–47)
HGB BLD-MCNC: 12.7 G/DL (ref 11.7–15.7)
HGB UR QL STRIP: ABNORMAL
IMM GRANULOCYTES # BLD: 0 10E3/UL
IMM GRANULOCYTES NFR BLD: 0 %
KETONES UR STRIP-MCNC: NEGATIVE MG/DL
LEUKOCYTE ESTERASE UR QL STRIP: NEGATIVE
LIPASE SERPL-CCNC: 128 U/L (ref 73–393)
LYMPHOCYTES # BLD AUTO: 2.5 10E3/UL (ref 0.8–5.3)
LYMPHOCYTES NFR BLD AUTO: 47 %
MCH RBC QN AUTO: 29.7 PG (ref 26.5–33)
MCHC RBC AUTO-ENTMCNC: 33.2 G/DL (ref 31.5–36.5)
MCV RBC AUTO: 90 FL (ref 78–100)
MONOCYTES # BLD AUTO: 0.3 10E3/UL (ref 0–1.3)
MONOCYTES NFR BLD AUTO: 5 %
NEUTROPHILS # BLD AUTO: 2.3 10E3/UL (ref 1.6–8.3)
NEUTROPHILS NFR BLD AUTO: 42 %
NITRATE UR QL: NEGATIVE
NRBC # BLD AUTO: 0 10E3/UL
NRBC BLD AUTO-RTO: 0 /100
PH UR STRIP: 7.5 [PH] (ref 5–7)
PLATELET # BLD AUTO: 268 10E3/UL (ref 150–450)
POTASSIUM BLD-SCNC: 4.4 MMOL/L (ref 3.4–5.3)
PROT SERPL-MCNC: 7.4 G/DL (ref 6.8–8.8)
RBC # BLD AUTO: 4.27 10E6/UL (ref 3.8–5.2)
RBC URINE: 1 /HPF
SODIUM SERPL-SCNC: 139 MMOL/L (ref 133–144)
SP GR UR STRIP: 1.01 (ref 1–1.03)
SQUAMOUS EPITHELIAL: <1 /HPF
UROBILINOGEN UR STRIP-MCNC: NORMAL MG/DL
WBC # BLD AUTO: 5.4 10E3/UL (ref 4–11)
WBC URINE: <1 /HPF

## 2022-07-12 PROCEDURE — 96365 THER/PROPH/DIAG IV INF INIT: CPT

## 2022-07-12 PROCEDURE — 81025 URINE PREGNANCY TEST: CPT | Performed by: EMERGENCY MEDICINE

## 2022-07-12 PROCEDURE — 80053 COMPREHEN METABOLIC PANEL: CPT | Performed by: EMERGENCY MEDICINE

## 2022-07-12 PROCEDURE — 258N000003 HC RX IP 258 OP 636: Performed by: EMERGENCY MEDICINE

## 2022-07-12 PROCEDURE — 82040 ASSAY OF SERUM ALBUMIN: CPT | Performed by: EMERGENCY MEDICINE

## 2022-07-12 PROCEDURE — 83690 ASSAY OF LIPASE: CPT | Performed by: EMERGENCY MEDICINE

## 2022-07-12 PROCEDURE — 96376 TX/PRO/DX INJ SAME DRUG ADON: CPT

## 2022-07-12 PROCEDURE — 250N000011 HC RX IP 250 OP 636: Performed by: EMERGENCY MEDICINE

## 2022-07-12 PROCEDURE — 85025 COMPLETE CBC W/AUTO DIFF WBC: CPT | Performed by: EMERGENCY MEDICINE

## 2022-07-12 PROCEDURE — 36415 COLL VENOUS BLD VENIPUNCTURE: CPT | Performed by: EMERGENCY MEDICINE

## 2022-07-12 PROCEDURE — 81001 URINALYSIS AUTO W/SCOPE: CPT | Performed by: EMERGENCY MEDICINE

## 2022-07-12 PROCEDURE — 96375 TX/PRO/DX INJ NEW DRUG ADDON: CPT

## 2022-07-12 PROCEDURE — 96361 HYDRATE IV INFUSION ADD-ON: CPT

## 2022-07-12 PROCEDURE — 99285 EMERGENCY DEPT VISIT HI MDM: CPT | Mod: 25

## 2022-07-12 PROCEDURE — 76830 TRANSVAGINAL US NON-OB: CPT

## 2022-07-12 RX ORDER — MAGNESIUM SULFATE HEPTAHYDRATE 40 MG/ML
2 INJECTION, SOLUTION INTRAVENOUS ONCE
Status: COMPLETED | OUTPATIENT
Start: 2022-07-12 | End: 2022-07-12

## 2022-07-12 RX ORDER — DEXAMETHASONE SODIUM PHOSPHATE 4 MG/ML
10 INJECTION, SOLUTION INTRA-ARTICULAR; INTRALESIONAL; INTRAMUSCULAR; INTRAVENOUS; SOFT TISSUE ONCE
Status: DISCONTINUED | OUTPATIENT
Start: 2022-07-12 | End: 2022-07-12

## 2022-07-12 RX ORDER — ONDANSETRON 2 MG/ML
4 INJECTION INTRAMUSCULAR; INTRAVENOUS ONCE
Status: COMPLETED | OUTPATIENT
Start: 2022-07-12 | End: 2022-07-12

## 2022-07-12 RX ORDER — DIPHENHYDRAMINE HYDROCHLORIDE 50 MG/ML
25 INJECTION INTRAMUSCULAR; INTRAVENOUS ONCE
Status: DISCONTINUED | OUTPATIENT
Start: 2022-07-12 | End: 2022-07-12

## 2022-07-12 RX ORDER — DIPHENHYDRAMINE HYDROCHLORIDE 50 MG/ML
25 INJECTION INTRAMUSCULAR; INTRAVENOUS ONCE
Status: COMPLETED | OUTPATIENT
Start: 2022-07-12 | End: 2022-07-12

## 2022-07-12 RX ORDER — KETOROLAC TROMETHAMINE 15 MG/ML
15 INJECTION, SOLUTION INTRAMUSCULAR; INTRAVENOUS ONCE
Status: COMPLETED | OUTPATIENT
Start: 2022-07-12 | End: 2022-07-12

## 2022-07-12 RX ORDER — HYDROMORPHONE HYDROCHLORIDE 1 MG/ML
0.5 INJECTION, SOLUTION INTRAMUSCULAR; INTRAVENOUS; SUBCUTANEOUS ONCE
Status: COMPLETED | OUTPATIENT
Start: 2022-07-12 | End: 2022-07-12

## 2022-07-12 RX ORDER — DEXAMETHASONE SODIUM PHOSPHATE 4 MG/ML
10 INJECTION, SOLUTION INTRA-ARTICULAR; INTRALESIONAL; INTRAMUSCULAR; INTRAVENOUS; SOFT TISSUE ONCE
Status: COMPLETED | OUTPATIENT
Start: 2022-07-12 | End: 2022-07-12

## 2022-07-12 RX ADMIN — SODIUM CHLORIDE 1000 ML: 9 INJECTION, SOLUTION INTRAVENOUS at 16:21

## 2022-07-12 RX ADMIN — DEXAMETHASONE SODIUM PHOSPHATE 10 MG: 4 INJECTION, SOLUTION INTRAMUSCULAR; INTRAVENOUS at 17:10

## 2022-07-12 RX ADMIN — DIPHENHYDRAMINE HYDROCHLORIDE 25 MG: 50 INJECTION, SOLUTION INTRAMUSCULAR; INTRAVENOUS at 16:28

## 2022-07-12 RX ADMIN — KETOROLAC TROMETHAMINE 15 MG: 15 INJECTION, SOLUTION INTRAMUSCULAR; INTRAVENOUS at 16:21

## 2022-07-12 RX ADMIN — PROCHLORPERAZINE EDISYLATE 10 MG: 5 INJECTION INTRAMUSCULAR; INTRAVENOUS at 16:24

## 2022-07-12 RX ADMIN — MAGNESIUM SULFATE HEPTAHYDRATE 2 G: 40 INJECTION, SOLUTION INTRAVENOUS at 16:29

## 2022-07-12 RX ADMIN — SODIUM CHLORIDE 1000 ML: 9 INJECTION, SOLUTION INTRAVENOUS at 14:05

## 2022-07-12 RX ADMIN — ONDANSETRON 4 MG: 2 INJECTION INTRAMUSCULAR; INTRAVENOUS at 14:06

## 2022-07-12 RX ADMIN — ONDANSETRON 4 MG: 2 INJECTION INTRAMUSCULAR; INTRAVENOUS at 11:51

## 2022-07-12 RX ADMIN — HYDROMORPHONE HYDROCHLORIDE 0.5 MG: 1 INJECTION, SOLUTION INTRAMUSCULAR; INTRAVENOUS; SUBCUTANEOUS at 11:52

## 2022-07-12 ASSESSMENT — ENCOUNTER SYMPTOMS
VOMITING: 1
NAUSEA: 1
LIGHT-HEADEDNESS: 1
CHILLS: 0
FEVER: 0
SHORTNESS OF BREATH: 0

## 2022-07-12 NOTE — ED NOTES
Pt came to  stating her pain is 10/10, then proceeded to lay on ground. Medications administered in Triage bay.

## 2022-07-12 NOTE — ED TRIAGE NOTES
A week of nausea. Abdominal pain began Sat. Vaginal bleeding, on period. Pt reports hx of endometriosis. Vomiting since last night.      Triage Assessment     Row Name 07/12/22 1034       Respiratory WDL    Rhythm/Pattern, Respiratory unlabored;pattern regular;depth regular       [place] Coma Scale    Best Eye Response 4-->(E4) spontaneous    Best Motor Response 6-->(M6) obeys commands    Best Verbal Response 5-->(V5) oriented    [place] Coma Scale Score 15

## 2022-07-12 NOTE — ED PROVIDER NOTES
History   Chief Complaint:  Nausea & Vomiting, Abdominal Pain, and Vaginal Bleeding       The history is provided by the patient.      Do Hassan is a 32 year old female with history of endometriosis and ovarian cysts who presents with nausea, vomiting, abdominal pain, and vaginal bleeding. She reports that she has had nausea for the last week and beginning last night, she has been unable to eat or drink anything without vomiting. In addition, she he has had an abdominal pain, vaginal bleeding, and back pain for the last 3 days and she started having a headache today. She describes her headache as feeling like a typical migraine with photosensitivity and lightheadedness, but this is the worst one in years. She has a history of endometriosis and says that he abdominal pain is similar to when she had this previously, but the pain is more severe now. She also has had ovarian cysts before and says her back pain feels similar to when she had this last. Denies having any fevers, chills, chest pain, shortness of breath, or changes in her bowel movements. She has not used tobacco or alcohol but does have a prescription for medical cannabis. She is currently on her menstrual cycle and this is normal timing for her.    Review of Systems   Constitutional: Negative for chills and fever.   Respiratory: Negative for shortness of breath.    Cardiovascular: Negative for chest pain.   Gastrointestinal: Positive for nausea and vomiting.        (-) changes in bowel movements   Genitourinary: Positive for vaginal bleeding.   Neurological: Positive for light-headedness.   All other systems reviewed and are negative.      Allergies:  Contrast Dye-Iodine    Medications:  Prozac  Prilosec  Lela- Birth control  Medical Cannabis  Oxycodone  Inderal  Trazodone    Past Medical History:     Óscar-Danlos syndrome type III  Endometriosis  POTS  Intussusception  DDD  Scoliosis  Depression  Asthma  Ovarian cysts    Past Surgical History:  "   Spinal Fusion L5-S1  Appendectomy  Cystectomy- left  GYN Laparoscopy  Orthopedic Surgery- right hip labial repair  Arthroscopy of shoulder  Family History:    Arthritis Mother  Hypothyroidism Mother  Heart Defect- Leaky valve Mother  Constipation- Ashkenazi Father  Eating Disorder Sister    Social History:  Patient came from home.  Patient is unaccompanied in the ED.  Patient denies tobacco use.  Patient denies alcohol use.      Physical Exam     Patient Vitals for the past 24 hrs:   BP Temp Temp src Pulse Resp SpO2 Height Weight   07/12/22 1611 -- -- -- -- -- 98 % -- --   07/12/22 1605 116/81 -- -- 75 -- -- -- --   07/12/22 1431 104/58 -- -- 66 16 99 % -- --   07/12/22 1008 107/61 -- -- -- -- -- -- --   07/12/22 1005 97/51 97.7  F (36.5  C) Temporal 70 18 98 % 1.626 m (5' 4\") 68 kg (150 lb)       Physical Exam  Constitutional: Well developed, nontox appearance  Head: Atraumatic.   Neck:  no stridor  Eyes: no scleral icterus  Cardiovascular: RRR, 2+ bilat radial pulses  Pulmonary/Chest: nml resp effort  Abdominal: ND, soft, BLQ tenderness, no rebound or guarding   : no CVA tenderness bilat  Ext: Warm, well perfused, no edema  Neurological: A&O, symmetric facies, moves ext x4  Skin: Skin is warm and dry.   Psychiatric: Behavior is normal. Thought content normal.   Nursing note and vitals reviewed.    Emergency Department Course     Imaging:  US Pelvis Cmplt w Transvag & Doppler LmtPel Duplex Limited   Final Result   IMPRESSION:     1.  Normal pelvic ultrasound.                 Report per radiology    Laboratory:  Labs Ordered and Resulted from Time of ED Arrival to Time of ED Departure   UA MACROSCOPIC WITH REFLEX TO MICRO AND CULTURE - Abnormal       Result Value    Color Urine Straw      Appearance Urine Clear      Glucose Urine Negative      Bilirubin Urine Negative      Ketones Urine Negative      Specific Gravity Urine 1.011      Blood Urine Small (*)     pH Urine 7.5 (*)     Protein Albumin Urine Negative  "     Urobilinogen Urine Normal      Nitrite Urine Negative      Leukocyte Esterase Urine Negative      RBC Urine 1      WBC Urine <1      Squamous Epithelials Urine <1     COMPREHENSIVE METABOLIC PANEL - Abnormal    Sodium 139      Potassium 4.4      Chloride 107      Carbon Dioxide (CO2) 26      Anion Gap 6      Urea Nitrogen 11      Creatinine 0.70      Calcium 9.1      Glucose 100 (*)     Alkaline Phosphatase 36 (*)     AST <3      ALT 22      Protein Total 7.4      Albumin 3.9      Bilirubin Total 0.5      GFR Estimate >90     HCG QUALITATIVE URINE - Normal    hCG Urine Qualitative Negative     LIPASE - Normal    Lipase 128     CBC WITH PLATELETS AND DIFFERENTIAL    WBC Count 5.4      RBC Count 4.27      Hemoglobin 12.7      Hematocrit 38.2      MCV 90      MCH 29.7      MCHC 33.2      RDW 11.9      Platelet Count 268      % Neutrophils 42      % Lymphocytes 47      % Monocytes 5      % Eosinophils 5      % Basophils 1      % Immature Granulocytes 0      NRBCs per 100 WBC 0      Absolute Neutrophils 2.3      Absolute Lymphocytes 2.5      Absolute Monocytes 0.3      Absolute Eosinophils 0.3      Absolute Basophils 0.1      Absolute Immature Granulocytes 0.0      Absolute NRBCs 0.0        Emergency Department Course:       Reviewed:  I reviewed nursing notes, vitals, past medical history and Care Everywhere    Assessments:  1609 I obtained history and examined the patient as noted above.    I rechecked the patient and explained findings.    Interventions:  1151 Zofran 4 mg IV  1152 Dilaudid 0.5 mg IV  1406 Zofran 4 mg IV  1615 Benadryl 25 mg IV  1621 Toradol 15 mg  IV  1622 Decadron 10 mg IV  1628 Benadryl 25 mg IV  1629 Magnesium Sulfate 2 g IV  1710 Decadron 10 mg IV  1710 Magnesium Sulfate 2 g IV    Disposition:  The patient was discharged to home.     Impression & Plan     CMS Diagnoses: None      Medical Decision Makin year old female presenting w/ abdominal pain, vaginal bleeding     DDx includes  endometriosis, menstruation, ovarian cyst, anemia, pregnancy, ectopic pregnancy, acute on chronic abdominal pain, typical migraine.    Labs significant for negative pregnancy, no remarkable abnormality.  Imaging sig for no remarkable abnormality.  Interventions as noted above with significant improvement in symptoms.  Given reassuring hemoglobin level patient's history of endometriosis with similar pain  atypical areas during menstruation, pelvic exam deferred.  Presentation seems consistent with endometriosis flare and typical migraines.  At this time I feel the pt is safe for discharge.  Recommendations given regarding follow up with PCP, OBGYN and return to the emergency department as needed for new or worsening symptoms.  Pt counseled on all results, disposition and diagnosis.  They are understanding and agreeable to plan. Patient discharged in stable condition.      Diagnosis:    ICD-10-CM    1. Other migraine without status migrainosus, not intractable  G43.809    2. Endometriosis  N80.9    3. Abdominal pain, lower  R10.30        Discharge Medications:  Discharge Medication List as of 7/12/2022  5:48 PM          Scribe Disclosure:  ISundeep, am serving as a scribe at 3:07 PM on 7/12/2022 to document services personally performed by Maurizio Mandel MD based on my observations and the provider's statements to me.     IPedro, am serving as a scribe at 3:28 PM on 7/12/2022 to document services personally performed by Maurizio Mandel MD based on my observations and the provider's statements to me.       Maurizio Mandel MD  07/12/22 1829

## 2022-07-12 NOTE — DISCHARGE INSTRUCTIONS
Discharge Instructions  Migraine    You were seen today for a headache that your provider thinks is likely a migraine. At this time your provider does not find that your headache is a sign of anything dangerous or life-threatening.  However, sometimes the signs of serious illness do not show up right away.      Generally, every Emergency Department visit should have a follow-up clinic visit with either a primary or a specialty clinic/provider. Please follow-up as instructed by your emergency provider today.    Return to the Emergency Department if:  You get a fever of 100.4 F or higher.  You get a stiff neck with your headache.  You get a new headache that is different or worse than headaches you have had before.  You are vomiting (throwing up) and cannot keep food or water down.  You have blurry or double vision or other problems with your eyes.  You have a new weakness on one side of your body.  You have difficulty with balance which is new.  You or your family thinks you are confused.  You have a seizure.    Treatment:  Often, treatment for your migraine will take some time to make you headache stop.  Going home to sleep can be very effective.  Use your medications as directed; overuse of medications can actually cause headaches.  Once your headache has gone away, avoid triggers such as certain foods, skipping meals, bright lights, changes in sleep, exercise and stress.  Migraine headaches can have symptoms before the pain starts, like vision changes, funny smells/tastes, dizziness or other symptoms.  Treating a headache as soon as the first symptoms come on is very important and gives the best chance of stopping the headache.  If headaches are severe or frequent, you may need to start daily medication to prevent the headaches.  Carbon monoxide can cause headaches, so not burning things in your home is important.  Also get a carbon monoxide detector.  Some medications for migraines may raise your blood  pressure, so use with caution if you have high blood pressure or heart problems.  If you were given a prescription for medicine here today, be sure to read all of the information (including the package insert) that comes with your prescription.  This will include important information about the medicine, its side effects, and any warnings that you need to know about.  The pharmacist who fills the prescription can provide more information and answer questions you may have about the medicine.  If you have questions or concerns that the pharmacist cannot address, please call or return to the Emergency Department.   Remember that you can always come back to the Emergency Department if you are not able to see your regular provider in the amount of time listed above, if you get any new symptoms, or if there is anything that worries you.

## 2022-08-03 ENCOUNTER — HOSPITAL ENCOUNTER (OUTPATIENT)
Dept: CT IMAGING | Facility: CLINIC | Age: 32
Discharge: HOME | End: 2022-08-03
Attending: OBSTETRICS & GYNECOLOGY | Admitting: OBSTETRICS & GYNECOLOGY
Payer: COMMERCIAL

## 2022-08-03 DIAGNOSIS — R10.2 PELVIC PAIN: ICD-10-CM

## 2022-08-03 PROCEDURE — 74177 CT ABD & PELVIS W/CONTRAST: CPT

## 2022-08-03 PROCEDURE — 250N000011 HC RX IP 250 OP 636: Performed by: OBSTETRICS & GYNECOLOGY

## 2022-08-03 PROCEDURE — 250N000009 HC RX 250: Performed by: OBSTETRICS & GYNECOLOGY

## 2022-08-03 RX ORDER — IOPAMIDOL 755 MG/ML
74 INJECTION, SOLUTION INTRAVASCULAR ONCE
Status: COMPLETED | OUTPATIENT
Start: 2022-08-03 | End: 2022-08-03

## 2022-08-03 RX ADMIN — SODIUM CHLORIDE 61 ML: 9 INJECTION, SOLUTION INTRAVENOUS at 17:11

## 2022-08-03 RX ADMIN — IOPAMIDOL 74 ML: 755 INJECTION, SOLUTION INTRAVENOUS at 17:11

## 2022-08-31 NOTE — ED ADULT NURSE NOTE - FINAL NURSING ELECTRONIC SIGNATURE
Prior to injection verified patient identity using patient's name and date of birth.    Screening Questionnaire for Pediatric Immunization     Is the child sick today?   No    Does the child have allergies to medications, food a vaccine component, or latex?   No    Has the child had a serious reaction to a vaccine in the past?   No    Has the child had a health problem with lung, heart, kidney or metabolic disease (e.g., diabetes), asthma, or a blood disorder?  Is he/she on long-term aspirin therapy?   No    If the child to be vaccinated is 2 through 4 years of age, has a healthcare provider told you that the child had wheezing or asthma in the  past 12 months?   No   If your child is a baby, have you ever been told he or she has had intussusception ?   No    Has the child, sibling or parent had a seizure, has the child had brain or other nervous system problems?   No    Does the child have cancer, leukemia, AIDS, or any immune system          problem?   No    In the past 3 months, has the child taken medications that affect the immune system such as prednisone, other steroids, or anticancer drugs; drugs for the treatment of rheumatoid arthritis, Crohn s disease, or psoriasis; or had radiation treatments?   No   In the past year, has the child received a transfusion of blood or blood products, or been given immune (gamma) globulin or an antiviral drug?   No    Is the child/teen pregnant or is there a chance that she could become         pregnant during the next month?   No    Has the child received any vaccinations in the past 4 weeks?   No      Immunization questionnaire answers were all negative.        [place] given to patient.    Per orders of Dr. LAQUITA M.D. , injection of MMR, VARICELLA AND QUADRACEL given by MARLIN Damon.   Patient instructed to remain in clinic for 15 minutes afterwards, and to report any adverse reaction to me immediately.    Screening performed by  Ariana Grimes, A        01-Jul-2020 06:01

## 2022-09-06 ENCOUNTER — MEDICAL CORRESPONDENCE (OUTPATIENT)
Dept: HEALTH INFORMATION MANAGEMENT | Facility: CLINIC | Age: 32
End: 2022-09-06

## 2022-09-08 ENCOUNTER — LAB (OUTPATIENT)
Dept: LAB | Facility: CLINIC | Age: 32
End: 2022-09-08
Payer: COMMERCIAL

## 2022-09-08 DIAGNOSIS — G89.18 POST-OPERATIVE PAIN: ICD-10-CM

## 2022-09-08 LAB
ALBUMIN UR-MCNC: NEGATIVE MG/DL
ANION GAP SERPL CALCULATED.3IONS-SCNC: 5 MMOL/L (ref 3–14)
APPEARANCE UR: CLEAR
BACTERIA #/AREA URNS HPF: ABNORMAL /HPF
BASOPHILS # BLD AUTO: 0.1 10E3/UL (ref 0–0.2)
BASOPHILS NFR BLD AUTO: 1 %
BILIRUB UR QL STRIP: NEGATIVE
BUN SERPL-MCNC: 9 MG/DL (ref 7–30)
CALCIUM SERPL-MCNC: 8.8 MG/DL (ref 8.5–10.1)
CHLORIDE BLD-SCNC: 107 MMOL/L (ref 94–109)
CO2 SERPL-SCNC: 25 MMOL/L (ref 20–32)
COLOR UR AUTO: ABNORMAL
CREAT SERPL-MCNC: 0.76 MG/DL (ref 0.52–1.04)
CRP SERPL-MCNC: <2.9 MG/L (ref 0–8)
EOSINOPHIL # BLD AUTO: 0.3 10E3/UL (ref 0–0.7)
EOSINOPHIL NFR BLD AUTO: 5 %
ERYTHROCYTE [DISTWIDTH] IN BLOOD BY AUTOMATED COUNT: 12 % (ref 10–15)
ERYTHROCYTE [SEDIMENTATION RATE] IN BLOOD BY WESTERGREN METHOD: 10 MM/HR (ref 0–20)
GFR SERPL CREATININE-BSD FRML MDRD: >90 ML/MIN/1.73M2
GLUCOSE BLD-MCNC: 119 MG/DL (ref 70–99)
GLUCOSE UR STRIP-MCNC: NEGATIVE MG/DL
HCT VFR BLD AUTO: 40.4 % (ref 35–47)
HGB BLD-MCNC: 13.6 G/DL (ref 11.7–15.7)
HGB UR QL STRIP: NEGATIVE
IMM GRANULOCYTES # BLD: 0 10E3/UL
IMM GRANULOCYTES NFR BLD: 0 %
KETONES UR STRIP-MCNC: NEGATIVE MG/DL
LEUKOCYTE ESTERASE UR QL STRIP: NEGATIVE
LYMPHOCYTES # BLD AUTO: 2 10E3/UL (ref 0.8–5.3)
LYMPHOCYTES NFR BLD AUTO: 28 %
MCH RBC QN AUTO: 30 PG (ref 26.5–33)
MCHC RBC AUTO-ENTMCNC: 33.7 G/DL (ref 31.5–36.5)
MCV RBC AUTO: 89 FL (ref 78–100)
MONOCYTES # BLD AUTO: 0.5 10E3/UL (ref 0–1.3)
MONOCYTES NFR BLD AUTO: 6 %
NEUTROPHILS # BLD AUTO: 4.4 10E3/UL (ref 1.6–8.3)
NEUTROPHILS NFR BLD AUTO: 60 %
NITRATE UR QL: NEGATIVE
NRBC # BLD AUTO: 0 10E3/UL
NRBC BLD AUTO-RTO: 0 /100
PH UR STRIP: 6 [PH] (ref 5–7)
PLATELET # BLD AUTO: 274 10E3/UL (ref 150–450)
POTASSIUM BLD-SCNC: 3.9 MMOL/L (ref 3.4–5.3)
RBC # BLD AUTO: 4.53 10E6/UL (ref 3.8–5.2)
RBC URINE: <1 /HPF
SODIUM SERPL-SCNC: 137 MMOL/L (ref 133–144)
SP GR UR STRIP: 1 (ref 1–1.03)
SQUAMOUS EPITHELIAL: <1 /HPF
UROBILINOGEN UR STRIP-MCNC: NORMAL MG/DL
WBC # BLD AUTO: 7.3 10E3/UL (ref 4–11)
WBC URINE: 1 /HPF

## 2022-09-08 PROCEDURE — 80048 BASIC METABOLIC PNL TOTAL CA: CPT

## 2022-09-08 PROCEDURE — 36415 COLL VENOUS BLD VENIPUNCTURE: CPT

## 2022-09-08 PROCEDURE — 85025 COMPLETE CBC W/AUTO DIFF WBC: CPT

## 2022-09-08 PROCEDURE — 85652 RBC SED RATE AUTOMATED: CPT

## 2022-09-08 PROCEDURE — 81001 URINALYSIS AUTO W/SCOPE: CPT

## 2022-09-08 PROCEDURE — 86140 C-REACTIVE PROTEIN: CPT

## 2022-09-24 ENCOUNTER — HEALTH MAINTENANCE LETTER (OUTPATIENT)
Age: 32
End: 2022-09-24

## 2022-10-13 NOTE — ED PROVIDER NOTE - CPE EDP PSYCH NORM
Problem: At Risk for Falls  Goal: # Patient does not fall  Outcome: Outcome Met, Continue evaluating goal progress toward completion  Note: Pt using call light appropriately.      Problem: Pain  Goal: #Acceptable pain level achieved/maintained at rest using NRS/Faces  Description: This goal is used for patients who can self-report.  Acceptable means the level is at or below the identified comfort/function goal.  Outcome: Outcome Not Met, Continue to Monitor  Note: Pt requesting lidocaine patch, reported to MD.      Problem: Pressure Injury, Risk for  Goal: # Skin remains intact  Outcome: Outcome Met, Continue evaluating goal progress toward completion  Note: Pt independent in bed mobility.      Problem: Diabetes  Goal: Glycemic balance achieved/maintained  Description: Goal is to maintain blood sugar within range with no episodes of hypoglycemia  Outcome: Outcome Not Met, Continue to Monitor  Note: Pt now off insulin gtt, lantus started and additional insulin given per MD order.       normal...

## 2022-11-04 ENCOUNTER — HOSPITAL ENCOUNTER (EMERGENCY)
Facility: CLINIC | Age: 32
Discharge: HOME | End: 2022-11-04
Attending: EMERGENCY MEDICINE | Admitting: EMERGENCY MEDICINE
Payer: COMMERCIAL

## 2022-11-04 ENCOUNTER — APPOINTMENT (OUTPATIENT)
Dept: CT IMAGING | Facility: CLINIC | Age: 32
End: 2022-11-04
Attending: EMERGENCY MEDICINE
Payer: COMMERCIAL

## 2022-11-04 VITALS
TEMPERATURE: 97.9 F | OXYGEN SATURATION: 98 % | RESPIRATION RATE: 16 BRPM | DIASTOLIC BLOOD PRESSURE: 84 MMHG | HEART RATE: 109 BPM | SYSTOLIC BLOOD PRESSURE: 124 MMHG

## 2022-11-04 DIAGNOSIS — R10.32 ABDOMINAL PAIN, LEFT LOWER QUADRANT: ICD-10-CM

## 2022-11-04 DIAGNOSIS — M54.6 LEFT-SIDED THORACIC BACK PAIN, UNSPECIFIED CHRONICITY: ICD-10-CM

## 2022-11-04 LAB
ALBUMIN SERPL-MCNC: 3.9 G/DL (ref 3.4–5)
ALBUMIN UR-MCNC: NEGATIVE MG/DL
ALP SERPL-CCNC: 53 U/L (ref 40–150)
ALT SERPL W P-5'-P-CCNC: 21 U/L (ref 0–50)
ANION GAP SERPL CALCULATED.3IONS-SCNC: 4 MMOL/L (ref 3–14)
APPEARANCE UR: CLEAR
AST SERPL W P-5'-P-CCNC: 10 U/L (ref 0–45)
B-HCG SERPL-ACNC: <1 IU/L (ref 0–5)
BACTERIA #/AREA URNS HPF: ABNORMAL /HPF
BASOPHILS # BLD AUTO: 0.1 10E3/UL (ref 0–0.2)
BASOPHILS NFR BLD AUTO: 1 %
BILIRUB SERPL-MCNC: 0.5 MG/DL (ref 0.2–1.3)
BILIRUB UR QL STRIP: NEGATIVE
BUN SERPL-MCNC: 11 MG/DL (ref 7–30)
CALCIUM SERPL-MCNC: 9 MG/DL (ref 8.5–10.1)
CHLORIDE BLD-SCNC: 107 MMOL/L (ref 94–109)
CO2 SERPL-SCNC: 26 MMOL/L (ref 20–32)
COLOR UR AUTO: ABNORMAL
CREAT SERPL-MCNC: 0.76 MG/DL (ref 0.52–1.04)
EOSINOPHIL # BLD AUTO: 0.2 10E3/UL (ref 0–0.7)
EOSINOPHIL NFR BLD AUTO: 2 %
ERYTHROCYTE [DISTWIDTH] IN BLOOD BY AUTOMATED COUNT: 12.1 % (ref 10–15)
GFR SERPL CREATININE-BSD FRML MDRD: >90 ML/MIN/1.73M2
GLUCOSE BLD-MCNC: 142 MG/DL (ref 70–99)
GLUCOSE UR STRIP-MCNC: NEGATIVE MG/DL
HCT VFR BLD AUTO: 37.3 % (ref 35–47)
HGB BLD-MCNC: 13 G/DL (ref 11.7–15.7)
HGB UR QL STRIP: NEGATIVE
IMM GRANULOCYTES # BLD: 0 10E3/UL
IMM GRANULOCYTES NFR BLD: 0 %
KETONES UR STRIP-MCNC: NEGATIVE MG/DL
LEUKOCYTE ESTERASE UR QL STRIP: NEGATIVE
LYMPHOCYTES # BLD AUTO: 1.9 10E3/UL (ref 0.8–5.3)
LYMPHOCYTES NFR BLD AUTO: 20 %
MCH RBC QN AUTO: 30.3 PG (ref 26.5–33)
MCHC RBC AUTO-ENTMCNC: 34.9 G/DL (ref 31.5–36.5)
MCV RBC AUTO: 87 FL (ref 78–100)
MONOCYTES # BLD AUTO: 0.6 10E3/UL (ref 0–1.3)
MONOCYTES NFR BLD AUTO: 6 %
MUCOUS THREADS #/AREA URNS LPF: PRESENT /LPF
NEUTROPHILS # BLD AUTO: 6.8 10E3/UL (ref 1.6–8.3)
NEUTROPHILS NFR BLD AUTO: 71 %
NITRATE UR QL: NEGATIVE
NRBC # BLD AUTO: 0 10E3/UL
NRBC BLD AUTO-RTO: 0 /100
PH UR STRIP: 5.5 [PH] (ref 5–7)
PLATELET # BLD AUTO: 308 10E3/UL (ref 150–450)
POTASSIUM BLD-SCNC: 3.6 MMOL/L (ref 3.4–5.3)
PROT SERPL-MCNC: 7.8 G/DL (ref 6.8–8.8)
RBC # BLD AUTO: 4.29 10E6/UL (ref 3.8–5.2)
RBC URINE: 1 /HPF
SODIUM SERPL-SCNC: 137 MMOL/L (ref 133–144)
SP GR UR STRIP: 1.01 (ref 1–1.03)
SQUAMOUS EPITHELIAL: <1 /HPF
TROPONIN I SERPL HS-MCNC: 5 NG/L
UROBILINOGEN UR STRIP-MCNC: NORMAL MG/DL
WBC # BLD AUTO: 9.5 10E3/UL (ref 4–11)
WBC URINE: <1 /HPF

## 2022-11-04 PROCEDURE — 250N000009 HC RX 250: Performed by: EMERGENCY MEDICINE

## 2022-11-04 PROCEDURE — 250N000013 HC RX MED GY IP 250 OP 250 PS 637: Performed by: EMERGENCY MEDICINE

## 2022-11-04 PROCEDURE — 36415 COLL VENOUS BLD VENIPUNCTURE: CPT | Performed by: EMERGENCY MEDICINE

## 2022-11-04 PROCEDURE — 250N000011 HC RX IP 250 OP 636: Performed by: EMERGENCY MEDICINE

## 2022-11-04 PROCEDURE — 81001 URINALYSIS AUTO W/SCOPE: CPT | Performed by: EMERGENCY MEDICINE

## 2022-11-04 PROCEDURE — 74177 CT ABD & PELVIS W/CONTRAST: CPT

## 2022-11-04 PROCEDURE — 82040 ASSAY OF SERUM ALBUMIN: CPT | Performed by: EMERGENCY MEDICINE

## 2022-11-04 PROCEDURE — 84484 ASSAY OF TROPONIN QUANT: CPT | Performed by: EMERGENCY MEDICINE

## 2022-11-04 PROCEDURE — 99285 EMERGENCY DEPT VISIT HI MDM: CPT | Mod: 25

## 2022-11-04 PROCEDURE — 84702 CHORIONIC GONADOTROPIN TEST: CPT | Performed by: EMERGENCY MEDICINE

## 2022-11-04 PROCEDURE — 96374 THER/PROPH/DIAG INJ IV PUSH: CPT | Mod: 59

## 2022-11-04 PROCEDURE — 85025 COMPLETE CBC W/AUTO DIFF WBC: CPT | Performed by: EMERGENCY MEDICINE

## 2022-11-04 PROCEDURE — 80053 COMPREHEN METABOLIC PANEL: CPT | Performed by: EMERGENCY MEDICINE

## 2022-11-04 RX ORDER — ONDANSETRON 2 MG/ML
4 INJECTION INTRAMUSCULAR; INTRAVENOUS EVERY 30 MIN PRN
Status: DISCONTINUED | OUTPATIENT
Start: 2022-11-04 | End: 2022-11-04 | Stop reason: HOSPADM

## 2022-11-04 RX ORDER — IOPAMIDOL 755 MG/ML
75 INJECTION, SOLUTION INTRAVASCULAR ONCE
Status: COMPLETED | OUTPATIENT
Start: 2022-11-04 | End: 2022-11-04

## 2022-11-04 RX ORDER — ONDANSETRON 4 MG/1
4 TABLET, ORALLY DISINTEGRATING ORAL ONCE
Status: DISCONTINUED | OUTPATIENT
Start: 2022-11-04 | End: 2022-11-04 | Stop reason: HOSPADM

## 2022-11-04 RX ORDER — HYDROCODONE BITARTRATE AND ACETAMINOPHEN 5; 325 MG/1; MG/1
2 TABLET ORAL ONCE
Status: COMPLETED | OUTPATIENT
Start: 2022-11-04 | End: 2022-11-04

## 2022-11-04 RX ADMIN — SODIUM CHLORIDE 63 ML: 900 INJECTION INTRAVENOUS at 15:38

## 2022-11-04 RX ADMIN — ONDANSETRON 4 MG: 2 INJECTION INTRAMUSCULAR; INTRAVENOUS at 13:54

## 2022-11-04 RX ADMIN — IOPAMIDOL 75 ML: 755 INJECTION, SOLUTION INTRAVENOUS at 15:38

## 2022-11-04 RX ADMIN — HYDROCODONE BITARTRATE AND ACETAMINOPHEN 2 TABLET: 5; 325 TABLET ORAL at 13:53

## 2022-11-04 ASSESSMENT — ENCOUNTER SYMPTOMS
SHORTNESS OF BREATH: 1
FLANK PAIN: 1

## 2022-11-04 NOTE — ED NOTES
Rapid Assessment Note    History:   Do Hassan is a 32 year old female who presents with a few months of chronic flank pain and new onset of left leg pain and shortness of breath which began earlier today. She reports that she has vascular compression which has caused pain and vomiting over the past few months. Today as she was driving to work, she became dizzy, short of breath, and had onset of left leg pain. She also notes a strange sensation in her chest. These are new symptoms, and her flank pain today overall feels worse than prior flares. No medications have been taken for symptom management. She recently started taking 325 mg aspirin last week.    Exam:   General:  Alert, interactive  Cardiovascular:  Well perfused  Lungs:  No respiratory distress, no accessory muscle use  Neuro:  Moving all 4 extremities  Skin:  Warm, dry  Psych:  Normal affect      Plan of Care:   CT PE with abdomen pelvis runoff, possible PE versus other, abdomen benign in triage, but any significant pain, tachycardic, and with moderate to high risk past medical condition      I evaluated the patient and developed an initial plan of care. I discussed this plan and explained that I, or one of my partners, would be returning to complete the evaluation.     I, Fara Church, am serving as a scribe to document services personally performed by Eric Little MD, based on my observations and the provider's statements to me.    11/4/2022  [employer] [facility]    Portions of this medical record were completed by a scribe. UPON MY REVIEW AND AUTHENTICATION BY ELECTRONIC SIGNATURE, this confirms (a) I performed the applicable clinical services, and (b) the record is accurate.      Kendell Little MD  11/04/22 4426

## 2022-11-05 NOTE — DISCHARGE INSTRUCTIONS
As we discussed, no clear cause of your abdominal pain, back pain or shortness of breath was found today.  Thankfully however the CT scan does not show any emergent malady or emergent cause of your pain.  I do appreciate you do still have some significant pain, please do use Tylenol Motrin to help with your pain and come back to the ER immediately if you have any additional symptoms, especially if you have any discoloration of your leg, if your leg becomes swollen, or with any other new symptoms that develop.  Please be absolutely certain that you see your regular doctor in the next 1 week, and regarding her dermoid cyst, please simply follow-up with your OB/GYN next week as well.  Come back with any other changes or concerns you have.

## 2022-11-05 NOTE — ED PROVIDER NOTES
History     Chief Complaint:  Flank Pain and Shortness of Breath     HPI:  The history is provided by the patient.      Do Hassan is a 32 year old female on 325 mg aspirin with history of Óscar-Danlos syndrome type III, spina bifida occulta, substance abuse, and a reported history of May-Thurner syndrome who presents with an exacerbation of her chronic flank pain and new onset of left leg pain and shortness of breath earlier today. She reports that she has vascular compression (May-Thurner syndrome) which has caused pain and vomiting over the past few months. Today as she was driving to work, she suddenly became dizzy, short of breath, and had onset of left leg pain. She also notes a strange sensation in her chest. These are new symptoms, and her flank pain today overall feels worse than prior May-Thurner flares. No medications have been taken for symptom management today. She recently started taking 325 mg aspirin last week.    Review of Systems   Respiratory: Positive for shortness of breath.    Cardiovascular: Positive for chest pain (strange sensation in chest).   Genitourinary: Positive for flank pain.   All other systems reviewed and are negative.    Allergies:  Contrast dye, iodinated  Nickel    Medications:  Prozac  Lela  Medical cannabis  Omeprazole  Oxycodone  Propanolol  Senna  Trazodone  Aspirin 325 mg    Past Medical History:     Óscar-Danlos syndrome type III  Endometriosis  POTS  Intussusception  DDD, lumbar  Scoliosis  Medical marijuana use  Depression  Anxiety  Asthma  Arthritis   Heartburn  Osteoporosis   Spina bifida occulta   Substance abuse   May-Thurner syndrome (per patient)    Past Surgical History:   Anterior posterior spinal fusion  Appendectomy  Ovarian cystectomy, left   Endometrial ablation/excision of endometriosis  Hip labial repair, right  Shoulder arthroscopy/labral repair  Intussusception repair    Family History:    Mother: arthritis, hypothyroidism, anorexia, heart  defect  Father: Ashkenazi  Sister: eating disorder    Social History:  The patient presents to the ED with a coworker and a family member.  The patient works here at [facility] [employer].    Physical Exam     Patient Vitals for the past 24 hrs:   BP Temp Temp src Pulse Resp SpO2   11/04/22 1344 124/84 97.9  F (36.6  C) Temporal 109 16 98 %   11/04/22 1343 -- -- Temporal -- -- --     Physical Exam  Vitals: reviewed by me  General: Pt seen on [facility], [facility], cooperative, and alert to conversation  Eyes: Tracking well, clear conjunctiva BL  ENT: MMM, midline trachea.   Lungs: No tachypnea, no accessory muscle use. No respiratory distress.  Lungs are clear to auscultation bilaterally  CV: Rate as above, normal S1-S2, no additional heart sounds noted  Abd: Soft, non tender, no guarding, no rebound. Non distended.  Specifically no left or right-sided CVA tenderness.  This exam was repeated at time of discharge and remained unchanged.  MSK: no joint effusion.  No evidence of trauma, no tenderness to palpation to left leg in area of pain, no gross swelling noted.  Skin: No rash  Neuro: Clear speech and no facial droop.  Psych: Not RIS, no e/o AH/VH      Emergency Department Course     Imaging:    CT Chest (PE) Abdomen Pelvis w Contrast  1.  No evidence of pulmonary embolus or other acute abnormality in the  chest, abdomen or pelvis.  Report per radiology    Laboratory:  Labs Ordered and Resulted from Time of ED Arrival to Time of ED Departure   COMPREHENSIVE METABOLIC PANEL - Abnormal       Result Value    Sodium 137      Potassium 3.6      Chloride 107      Carbon Dioxide (CO2) 26      Anion Gap 4      Urea Nitrogen 11      Creatinine 0.76      Calcium 9.0      Glucose 142 (*)     Alkaline Phosphatase 53      AST 10      ALT 21      Protein Total 7.8      Albumin 3.9      Bilirubin Total 0.5      GFR Estimate >90     ROUTINE UA WITH MICROSCOPIC REFLEX TO CULTURE - Abnormal    Color Urine Light Yellow       Appearance Urine Clear      Glucose Urine Negative      Bilirubin Urine Negative      Ketones Urine Negative      Specific Gravity Urine 1.012      Blood Urine Negative      pH Urine 5.5      Protein Albumin Urine Negative      Urobilinogen Urine Normal      Nitrite Urine Negative      Leukocyte Esterase Urine Negative      Bacteria Urine Few (*)     Mucus Urine Present (*)     RBC Urine 1      WBC Urine <1      Squamous Epithelials Urine <1     TROPONIN I - Normal    Troponin I High Sensitivity 5     HCG QUANTITATIVE PREGNANCY - Normal    hCG Quantitative <1     CBC WITH PLATELETS AND DIFFERENTIAL    WBC Count 9.5      RBC Count 4.29      Hemoglobin 13.0      Hematocrit 37.3      MCV 87      MCH 30.3      MCHC 34.9      RDW 12.1      Platelet Count 308      % Neutrophils 71      % Lymphocytes 20      % Monocytes 6      % Eosinophils 2      % Basophils 1      % Immature Granulocytes 0      NRBCs per 100 WBC 0      Absolute Neutrophils 6.8      Absolute Lymphocytes 1.9      Absolute Monocytes 0.6      Absolute Eosinophils 0.2      Absolute Basophils 0.1      Absolute Immature Granulocytes 0.0      Absolute NRBCs 0.0        Emergency Department Course:       Reviewed:  I reviewed nursing notes, vitals, past medical history and Care Everywhere    Assessments:  1702 I obtained history and examined the patient as noted above. I also explained findings. Repeat abdominal exam continues to be benign. Her pain has improved and she would like to go home.     Interventions:  1353 Norco 5-325 2 tablet PO  1354 Zofran 4 mg IV    Disposition:  The patient was discharged to home.     Impression & Plan     Medical Decision Making:  This is a very pleasant 32-year-old female who presents emergency room with what appears to be some level of chronic abdominal and back pain along with some new leg pain.  It is resolved essentially with Cortez, and her exam is normal.  I do not see any evidence of an infection, her labs are very  reassuring here, and she has a normal set of vitals.  A CT scan was also done given the degree of pain that she was in and also the most salient feature on her initial exam was tachypnea and she certainly mentioned how short of breath she was.  Thankfully there is no evidence of an emergent cause of this, and after my reassessment hours later, I noted that she was not in any respiratory distress, and continued to have a normal lung exam on my repeat exam.  Her abdomen remained benign on my reassessment prior to discharge, and we discussed how no clear cause of her symptoms was found.  She does not endorse any leg swelling, seems to be very well versed with her possible diagnosis and very healthcare literate.  Red flags for when to come back to the ER were discussed in detail, she seems quite reliable and I do believe that she will come back if anything changes.  I have also asked her to follow with her regular doctor the next 1 week as well as her OB/GYN for the dermoid cyst found incidentally on the CT scan    Diagnosis:    ICD-10-CM    1. Abdominal pain, left lower quadrant  R10.32       2. Left-sided thoracic back pain, unspecified chronicity  M54.6         Scribe Disclosure:  I, Fara Church, am serving as a scribe at 7:01 PM on 11/4/2022 to document services personally performed by Kendell Little MD based on my observations and the provider's statements to me.      Kendell Little MD  11/04/22 9235

## 2022-12-06 ENCOUNTER — TELEPHONE (OUTPATIENT)
Dept: FAMILY MEDICINE | Facility: CLINIC | Age: 32
End: 2022-12-06

## 2022-12-06 NOTE — TELEPHONE ENCOUNTER
Patient Quality Outreach    Patient is due for the following:   Asthma  -  ACT needed  Cervical Cancer Screening - PAP Needed  Depression  -  PHQ-9 needed      Topic Date Due     Hepatitis B Vaccine (1 of 3 - 3-dose series) Never done     COVID-19 Vaccine (4 - Booster for Moderna series) 01/21/2022     Flu Vaccine (1) 09/01/2022       Next Steps:   Schedule a Annual Wellness Visit    Type of outreach:    Sent [employer] message.    Next Steps:  Reach out within 90 days via Phone.    Max number of attempts reached: No. Will try again in 90 days if patient still on fail list.    Questions for provider review:    None     Mae Traylor RN

## 2022-12-08 ENCOUNTER — HOSPITAL ENCOUNTER (EMERGENCY)
Facility: CLINIC | Age: 32
Discharge: HOME | End: 2022-12-08
Attending: EMERGENCY MEDICINE | Admitting: EMERGENCY MEDICINE
Payer: COMMERCIAL

## 2022-12-08 ENCOUNTER — APPOINTMENT (OUTPATIENT)
Dept: ULTRASOUND IMAGING | Facility: CLINIC | Age: 32
End: 2022-12-08
Attending: EMERGENCY MEDICINE
Payer: COMMERCIAL

## 2022-12-08 VITALS
SYSTOLIC BLOOD PRESSURE: 116 MMHG | WEIGHT: 150 LBS | OXYGEN SATURATION: 96 % | BODY MASS INDEX: 26.58 KG/M2 | TEMPERATURE: 98.6 F | DIASTOLIC BLOOD PRESSURE: 71 MMHG | HEIGHT: 63 IN | RESPIRATION RATE: 14 BRPM | HEART RATE: 79 BPM

## 2022-12-08 DIAGNOSIS — D27.9 DERMOID CYST OF OVARY, UNSPECIFIED LATERALITY: ICD-10-CM

## 2022-12-08 DIAGNOSIS — R11.2 NAUSEA AND VOMITING, UNSPECIFIED VOMITING TYPE: ICD-10-CM

## 2022-12-08 DIAGNOSIS — N80.9 ENDOMETRIOSIS: ICD-10-CM

## 2022-12-08 DIAGNOSIS — R10.2 SUPRAPUBIC ABDOMINAL PAIN: ICD-10-CM

## 2022-12-08 LAB
ALBUMIN SERPL-MCNC: 4.1 G/DL (ref 3.4–5)
ALBUMIN UR-MCNC: NEGATIVE MG/DL
ALP SERPL-CCNC: 53 U/L (ref 40–150)
ALT SERPL W P-5'-P-CCNC: 21 U/L (ref 0–50)
ANION GAP SERPL CALCULATED.3IONS-SCNC: 6 MMOL/L (ref 3–14)
APPEARANCE UR: CLEAR
AST SERPL W P-5'-P-CCNC: 13 U/L (ref 0–45)
BASOPHILS # BLD AUTO: 0.1 10E3/UL (ref 0–0.2)
BASOPHILS NFR BLD AUTO: 1 %
BILIRUB SERPL-MCNC: 0.3 MG/DL (ref 0.2–1.3)
BILIRUB UR QL STRIP: NEGATIVE
BUN SERPL-MCNC: 9 MG/DL (ref 7–30)
CALCIUM SERPL-MCNC: 9 MG/DL (ref 8.5–10.1)
CHLORIDE BLD-SCNC: 105 MMOL/L (ref 94–109)
CO2 SERPL-SCNC: 27 MMOL/L (ref 20–32)
COLOR UR AUTO: ABNORMAL
CREAT SERPL-MCNC: 0.74 MG/DL (ref 0.52–1.04)
EOSINOPHIL # BLD AUTO: 0.3 10E3/UL (ref 0–0.7)
EOSINOPHIL NFR BLD AUTO: 5 %
ERYTHROCYTE [DISTWIDTH] IN BLOOD BY AUTOMATED COUNT: 12.2 % (ref 10–15)
GFR SERPL CREATININE-BSD FRML MDRD: >90 ML/MIN/1.73M2
GLUCOSE BLD-MCNC: 120 MG/DL (ref 70–99)
GLUCOSE UR STRIP-MCNC: NEGATIVE MG/DL
HCG UR QL: NEGATIVE
HCT VFR BLD AUTO: 37.9 % (ref 35–47)
HGB BLD-MCNC: 12.8 G/DL (ref 11.7–15.7)
HGB UR QL STRIP: ABNORMAL
HOLD SPECIMEN: NORMAL
IMM GRANULOCYTES # BLD: 0 10E3/UL
IMM GRANULOCYTES NFR BLD: 0 %
KETONES UR STRIP-MCNC: NEGATIVE MG/DL
LEUKOCYTE ESTERASE UR QL STRIP: NEGATIVE
LIPASE SERPL-CCNC: 134 U/L (ref 73–393)
LYMPHOCYTES # BLD AUTO: 2.2 10E3/UL (ref 0.8–5.3)
LYMPHOCYTES NFR BLD AUTO: 36 %
MCH RBC QN AUTO: 29.8 PG (ref 26.5–33)
MCHC RBC AUTO-ENTMCNC: 33.8 G/DL (ref 31.5–36.5)
MCV RBC AUTO: 88 FL (ref 78–100)
MONOCYTES # BLD AUTO: 0.4 10E3/UL (ref 0–1.3)
MONOCYTES NFR BLD AUTO: 6 %
MUCOUS THREADS #/AREA URNS LPF: PRESENT /LPF
NEUTROPHILS # BLD AUTO: 3.2 10E3/UL (ref 1.6–8.3)
NEUTROPHILS NFR BLD AUTO: 52 %
NITRATE UR QL: NEGATIVE
NRBC # BLD AUTO: 0 10E3/UL
NRBC BLD AUTO-RTO: 0 /100
PH UR STRIP: 7 [PH] (ref 5–7)
PLATELET # BLD AUTO: 283 10E3/UL (ref 150–450)
POTASSIUM BLD-SCNC: 3.7 MMOL/L (ref 3.4–5.3)
PROT SERPL-MCNC: 7.8 G/DL (ref 6.8–8.8)
RBC # BLD AUTO: 4.29 10E6/UL (ref 3.8–5.2)
RBC URINE: <1 /HPF
SODIUM SERPL-SCNC: 138 MMOL/L (ref 133–144)
SP GR UR STRIP: 1.01 (ref 1–1.03)
SQUAMOUS EPITHELIAL: <1 /HPF
UROBILINOGEN UR STRIP-MCNC: NORMAL MG/DL
WBC # BLD AUTO: 6 10E3/UL (ref 4–11)
WBC URINE: <1 /HPF

## 2022-12-08 PROCEDURE — 82040 ASSAY OF SERUM ALBUMIN: CPT | Performed by: EMERGENCY MEDICINE

## 2022-12-08 PROCEDURE — 83690 ASSAY OF LIPASE: CPT | Performed by: EMERGENCY MEDICINE

## 2022-12-08 PROCEDURE — 36415 COLL VENOUS BLD VENIPUNCTURE: CPT | Performed by: EMERGENCY MEDICINE

## 2022-12-08 PROCEDURE — 96374 THER/PROPH/DIAG INJ IV PUSH: CPT

## 2022-12-08 PROCEDURE — 96376 TX/PRO/DX INJ SAME DRUG ADON: CPT

## 2022-12-08 PROCEDURE — 96361 HYDRATE IV INFUSION ADD-ON: CPT

## 2022-12-08 PROCEDURE — 81001 URINALYSIS AUTO W/SCOPE: CPT | Performed by: EMERGENCY MEDICINE

## 2022-12-08 PROCEDURE — 258N000003 HC RX IP 258 OP 636: Performed by: EMERGENCY MEDICINE

## 2022-12-08 PROCEDURE — 80053 COMPREHEN METABOLIC PANEL: CPT | Performed by: EMERGENCY MEDICINE

## 2022-12-08 PROCEDURE — 96375 TX/PRO/DX INJ NEW DRUG ADDON: CPT

## 2022-12-08 PROCEDURE — 81025 URINE PREGNANCY TEST: CPT | Performed by: EMERGENCY MEDICINE

## 2022-12-08 PROCEDURE — 76830 TRANSVAGINAL US NON-OB: CPT

## 2022-12-08 PROCEDURE — 250N000011 HC RX IP 250 OP 636: Performed by: EMERGENCY MEDICINE

## 2022-12-08 PROCEDURE — 85025 COMPLETE CBC W/AUTO DIFF WBC: CPT | Performed by: EMERGENCY MEDICINE

## 2022-12-08 PROCEDURE — 99285 EMERGENCY DEPT VISIT HI MDM: CPT | Mod: 25

## 2022-12-08 RX ORDER — PROCHLORPERAZINE MALEATE 5 MG
5 TABLET ORAL EVERY 8 HOURS PRN
Qty: 20 TABLET | Refills: 0 | Status: SHIPPED | OUTPATIENT
Start: 2022-12-08 | End: 2023-05-18

## 2022-12-08 RX ORDER — HYDROMORPHONE HYDROCHLORIDE 1 MG/ML
0.5 INJECTION, SOLUTION INTRAMUSCULAR; INTRAVENOUS; SUBCUTANEOUS
Status: DISCONTINUED | OUTPATIENT
Start: 2022-12-08 | End: 2022-12-08 | Stop reason: HOSPADM

## 2022-12-08 RX ORDER — KETOROLAC TROMETHAMINE 15 MG/ML
15 INJECTION, SOLUTION INTRAMUSCULAR; INTRAVENOUS ONCE
Status: COMPLETED | OUTPATIENT
Start: 2022-12-08 | End: 2022-12-08

## 2022-12-08 RX ORDER — OXYCODONE HYDROCHLORIDE 5 MG/1
5 TABLET ORAL EVERY 6 HOURS PRN
Qty: 12 TABLET | Refills: 0 | Status: SHIPPED | OUTPATIENT
Start: 2022-12-08 | End: 2023-11-06

## 2022-12-08 RX ORDER — ONDANSETRON 2 MG/ML
4 INJECTION INTRAMUSCULAR; INTRAVENOUS ONCE
Status: COMPLETED | OUTPATIENT
Start: 2022-12-08 | End: 2022-12-08

## 2022-12-08 RX ADMIN — HYDROMORPHONE HYDROCHLORIDE 0.5 MG: 1 INJECTION, SOLUTION INTRAMUSCULAR; INTRAVENOUS; SUBCUTANEOUS at 14:01

## 2022-12-08 RX ADMIN — HYDROMORPHONE HYDROCHLORIDE 0.5 MG: 1 INJECTION, SOLUTION INTRAMUSCULAR; INTRAVENOUS; SUBCUTANEOUS at 15:21

## 2022-12-08 RX ADMIN — ONDANSETRON 4 MG: 2 INJECTION INTRAMUSCULAR; INTRAVENOUS at 13:57

## 2022-12-08 RX ADMIN — KETOROLAC TROMETHAMINE 15 MG: 15 INJECTION, SOLUTION INTRAMUSCULAR; INTRAVENOUS at 13:58

## 2022-12-08 RX ADMIN — SODIUM CHLORIDE 1000 ML: 9 INJECTION, SOLUTION INTRAVENOUS at 13:58

## 2022-12-08 ASSESSMENT — ACTIVITIES OF DAILY LIVING (ADL): ADLS_ACUITY_SCORE: 35

## 2022-12-08 ASSESSMENT — ENCOUNTER SYMPTOMS
COUGH: 0
NAUSEA: 1
FEVER: 0
VOMITING: 1
ABDOMINAL PAIN: 1
HEADACHES: 1
SORE THROAT: 0

## 2022-12-08 NOTE — ED TRIAGE NOTES
Pt reports planned surgery for her ovarian cysts on 12/21. Pt reports increasing abdominal pain and vaginal bleeding. Pt reports cannot wait until surgery.      Triage Assessment     Row Name 12/08/22 1212       Respiratory WDL    Rhythm/Pattern, Respiratory depth regular;pattern regular;unlabored       [place] Coma Scale    Best Eye Response 4-->(E4) spontaneous    Best Motor Response 6-->(M6) obeys commands    Best Verbal Response 5-->(V5) oriented    [place] Coma Scale Score 15

## 2022-12-08 NOTE — ED PROVIDER NOTES
History   Chief Complaint:  Abdominal Pain and Nausea     The history is provided by the patient.      Do Hassan is a 32 year old female with history of endometriosis who presents with abdominal pain and nausea. Yesterday she was at her OB where they found a 2 cm ovarian, dermoid cyst and scheduled for removal in 13 days. Today she has abnormal vaginal bleeding with clotting and vaginal discharge. She has abdominal cramps and back pain that reach 7/10. She took Zofran with no relief. She takes medical cannabis for her endometriosis with no relief. Notes a headache. She is dry heaving, vomiting, and nauseous. She was taking Tylenol and ibuprofen but has not today due to not being able to eat food. Denies fever, sore throat, cough, or chest pain. 2 years ago she had a dermoid cyst removed.     Review of Systems   Constitutional: Negative for fever.   HENT: Negative for sore throat.    Respiratory: Negative for cough.    Cardiovascular: Negative for chest pain.   Gastrointestinal: Positive for abdominal pain, nausea and vomiting.   Neurological: Positive for headaches.   All other systems reviewed and are negative.    Allergies:  Contrast Dye  Diagnostic X-Ray Materials  Nickel    Medications:  Prozac  Roxicodone  Inderal  Senna  Desyrel  Medical cannabis  Prilosec    Past Medical History:     Óscar-Danlos syndrome type III  Endometriosis  POTS  Intussusception  DDD  Scoliosis  Medical marijuana use  MDD  Asthma    Past Surgical History:    Anterior posterior spinal fusion  Appendectomy  Cystectomy ovarian left  DaVinci assisted ablation/excision of endometriosis  Laparoscopic diagnostic endometriosis  Right hip labial repair    Family History:    Mother: arthritis, hypothyroidism, anorexia nervosa, heart defect  Father: Ashkenazi  Sister: eating disorder    Social History:  The patient presents to the ED alone  PCP: Wendi Fernandez     Physical Exam     Patient Vitals for the past 24 hrs:   BP Temp Temp  "src Pulse Resp SpO2 Height Weight   12/08/22 1227 134/79 98.6  F (37  C) Temporal 84 20 97 % 1.6 m (5' 3\") 68 kg (150 lb)     Physical Exam  General: Alert, appears well-developed and well-nourished. Cooperative.     In mild distress  HEENT:  Head:  Atraumatic  Ears:  External ears are normal  Mouth/Throat:  Oropharynx is without erythema or exudate and mucous membranes are moist.   Eyes:   Conjunctivae normal and EOM are normal. No scleral icterus.  CV:  Normal rate, regular rhythm, normal heart sounds and radial pulses are 2+ and symmetric.  No murmur.  Resp:  Breath sounds are clear bilaterally    Non-labored, no retractions or accessory muscle use  GI:  Abdomen is soft, no distension, RLQ, midline suprapubic and LLQ tenderness. No rebound or guarding.  No CVA tenderness bilaterally.  Dry heaving at bedside.   MS:  Normal range of motion. No edema.    Normal strength in all 4 extremities.     Back atraumatic.    No midline cervical, thoracic, or lumbar tenderness  Skin:  Warm and dry.  No rash or lesions noted.  Neuro: Alert. Normal strength.  GCS: 15  Psych:  Normal mood and affect.    Emergency Department Course   Imaging:  US Pelvis Cmplt w Transvag & Doppler LmtPel Duplex Limited    (Results Pending)     Report per radiology    Laboratory:  Labs Ordered and Resulted from Time of ED Arrival to Time of ED Departure   UA MACROSCOPIC WITH REFLEX TO MICRO AND CULTURE - Abnormal       Result Value    Color Urine Light Yellow      Appearance Urine Clear      Glucose Urine Negative      Bilirubin Urine Negative      Ketones Urine Negative      Specific Gravity Urine 1.011      Blood Urine Trace (*)     pH Urine 7.0      Protein Albumin Urine Negative      Urobilinogen Urine Normal      Nitrite Urine Negative      Leukocyte Esterase Urine Negative      Mucus Urine Present (*)     RBC Urine <1      WBC Urine <1      Squamous Epithelials Urine <1     COMPREHENSIVE METABOLIC PANEL - Abnormal    Sodium 138      Potassium " 3.7      Chloride 105      Carbon Dioxide (CO2) 27      Anion Gap 6      Urea Nitrogen 9      Creatinine 0.74      Calcium 9.0      Glucose 120 (*)     Alkaline Phosphatase 53      AST 13      ALT 21      Protein Total 7.8      Albumin 4.1      Bilirubin Total 0.3      GFR Estimate >90     HCG QUALITATIVE URINE - Normal    hCG Urine Qualitative Negative     LIPASE - Normal    Lipase 134     CBC WITH PLATELETS AND DIFFERENTIAL    WBC Count 6.0      RBC Count 4.29      Hemoglobin 12.8      Hematocrit 37.9      MCV 88      MCH 29.8      MCHC 33.8      RDW 12.2      Platelet Count 283      % Neutrophils 52      % Lymphocytes 36      % Monocytes 6      % Eosinophils 5      % Basophils 1      % Immature Granulocytes 0      NRBCs per 100 WBC 0      Absolute Neutrophils 3.2      Absolute Lymphocytes 2.2      Absolute Monocytes 0.4      Absolute Eosinophils 0.3      Absolute Basophils 0.1      Absolute Immature Granulocytes 0.0      Absolute NRBCs 0.0        Procedures    Emergency Department Course:  Reviewed:  I reviewed nursing notes, vitals, past medical history and Care Everywhere    Assessments:  1330 I obtained history and examined the patient as noted above.   1510 I rechecked the patient and explained findings.     Consults:  Awaiting US results to eventually speak with OBGYN Specialists.     Interventions:  1357 Zofran 4 mg IV  1358 NS 1L IV  1358 Toradol 15 mg IV  1401 Dilaudid 0.5 mg IV    Disposition:  Care signed out to my partner Dr. Bose pending US results, discussion with OBGYN Specialists and final disposition.      Impression & Plan       Medical Decision Making:  Patient is a 32-year-old female who presents with severe midline suprapubic abdominal pain with ongoing nausea and vomiting symptoms in the setting of a known right-sided dermoid cyst.  She is followed with Dr. Freeman with OB/GYN specialist.  She presents today both for symptom management and for ongoing concern of potential expanding dermoid  cyst and/or ovarian torsion.  Patient has a ultrasound pending at time of signout.  Urinalysis unremarkable and urine pregnancy test is negative.  Patient's laboratory work is unremarkable with normal renal function, electrolytes, blood counts, and lipase.  Her pain is all lower in characterization and so with a unremarkable urinalysis lower concern for acute cystitis and/or pyelonephritis.  She has had a prior appendectomy and so lower concern for sinister intra-abdominal processes that would require advanced CT imaging today.  Given her history of dermoid cyst today ultrasound is pending at this time.  Pending ultrasound results care was signed out to my partner Dr. Bose pending final reassessment and likely conversation with OB/GYN specialist given the patient's ongoing severe suprapubic symptoms and history of dermoid cyst.  On final recheck patient continues to have moderate to severe pain in the midline suprapubic region and is requiring multiple doses of IV Dilaudid.     Diagnosis:    ICD-10-CM    1. Suprapubic abdominal pain  R10.2       2. Dermoid cyst of ovary, unspecified laterality  D27.9       3. Nausea and vomiting, unspecified vomiting type  R11.2           Discharge Medications:  New Prescriptions    No medications on file       Scribe Disclosure:  I, Nereyda Dietz, am serving as a scribe at 1:44 PM on 12/8/2022 to document services personally performed by Steven Black MD based on my observations and the provider's statements to me.        Steven Black MD  12/08/22 1517       Steven Black MD  12/08/22 152

## 2022-12-08 NOTE — ED NOTES
I spoke with Dr. Freeman, the patient's OB/GYN physician, who is aware of the situation.  No indication for emergency surgery.     Kendell Bose MD  12/08/22 1576

## 2022-12-08 NOTE — ED NOTES
This patient presents to the emergency department with pelvic pain.  Please see Dr. Black's note for full details.  He signed this patient out to me to follow-up on the ultrasound at 1500 hrs.  The ultrasound of the pelvis shows a 9 mm dermoid cyst involving the left ovary.  There is no evidence of torsion or other significant abnormality.  The patient requested that I call the on-call OB/GYN physician to discuss the findings.  This was done.  She also requested some pain medication and antiemetic agent for home use.  The patient will be a candidate for discharge and she will need to contact the primary OB/GYN physician, Dr. Freeman, to see if her surgery could be moved up sooner.  This may well reflect acute on chronic pain there could be an element of endometriosis and slight pain from the dermoid cyst.     Kendell Bose MD  12/08/22 1786

## 2022-12-08 NOTE — DISCHARGE INSTRUCTIONS
Follow-up with Dr. Freeman.  Please call the office tomorrow  Return to the emergency department for marked increase in pain, fever, chills, sweats, or a marked increase in vaginal bleeding.

## 2022-12-08 NOTE — ED NOTES
Patient reports improved discomfort. Assisted to undress from waist down. Patient requests to leave panties/peripad in place.     To US on cart

## 2022-12-21 ENCOUNTER — LAB REQUISITION (OUTPATIENT)
Dept: LAB | Facility: CLINIC | Age: 32
End: 2022-12-21
Payer: COMMERCIAL

## 2022-12-21 PROCEDURE — 88305 TISSUE EXAM BY PATHOLOGIST: CPT | Mod: TC,ORL | Performed by: OBSTETRICS & GYNECOLOGY

## 2022-12-21 PROCEDURE — 88307 TISSUE EXAM BY PATHOLOGIST: CPT | Mod: 26 | Performed by: PATHOLOGY

## 2022-12-22 LAB
PATH REPORT.COMMENTS IMP SPEC: NORMAL
PATH REPORT.COMMENTS IMP SPEC: NORMAL
PATH REPORT.FINAL DX SPEC: NORMAL
PATH REPORT.GROSS SPEC: NORMAL
PATH REPORT.MICROSCOPIC SPEC OTHER STN: NORMAL
PATH REPORT.RELEVANT HX SPEC: NORMAL
PHOTO IMAGE: NORMAL

## 2022-12-31 ENCOUNTER — APPOINTMENT (OUTPATIENT)
Dept: CT IMAGING | Facility: CLINIC | Age: 32
End: 2022-12-31
Attending: EMERGENCY MEDICINE
Payer: COMMERCIAL

## 2022-12-31 ENCOUNTER — HOSPITAL ENCOUNTER (EMERGENCY)
Facility: CLINIC | Age: 32
Discharge: HOME | End: 2022-12-31
Attending: EMERGENCY MEDICINE | Admitting: EMERGENCY MEDICINE
Payer: COMMERCIAL

## 2022-12-31 ENCOUNTER — APPOINTMENT (OUTPATIENT)
Dept: ULTRASOUND IMAGING | Facility: CLINIC | Age: 32
End: 2022-12-31
Attending: EMERGENCY MEDICINE
Payer: COMMERCIAL

## 2022-12-31 VITALS
SYSTOLIC BLOOD PRESSURE: 106 MMHG | DIASTOLIC BLOOD PRESSURE: 78 MMHG | RESPIRATION RATE: 20 BRPM | HEART RATE: 88 BPM | BODY MASS INDEX: 26.58 KG/M2 | TEMPERATURE: 98.5 F | OXYGEN SATURATION: 96 % | WEIGHT: 150 LBS | HEIGHT: 63 IN

## 2022-12-31 DIAGNOSIS — R10.2 PELVIC PAIN IN FEMALE: ICD-10-CM

## 2022-12-31 LAB
ALBUMIN SERPL-MCNC: 3.6 G/DL (ref 3.4–5)
ALBUMIN UR-MCNC: 30 MG/DL
ALP SERPL-CCNC: 43 U/L (ref 40–150)
ALT SERPL W P-5'-P-CCNC: 17 U/L (ref 0–50)
ANION GAP SERPL CALCULATED.3IONS-SCNC: 4 MMOL/L (ref 3–14)
APPEARANCE UR: ABNORMAL
AST SERPL W P-5'-P-CCNC: 9 U/L (ref 0–45)
BACTERIA #/AREA URNS HPF: ABNORMAL /HPF
BASOPHILS # BLD AUTO: 0.1 10E3/UL (ref 0–0.2)
BASOPHILS NFR BLD AUTO: 1 %
BILIRUB SERPL-MCNC: 0.3 MG/DL (ref 0.2–1.3)
BILIRUB UR QL STRIP: NEGATIVE
BUN SERPL-MCNC: 14 MG/DL (ref 7–30)
CALCIUM SERPL-MCNC: 8 MG/DL (ref 8.5–10.1)
CHLORIDE BLD-SCNC: 107 MMOL/L (ref 94–109)
CO2 SERPL-SCNC: 25 MMOL/L (ref 20–32)
COLOR UR AUTO: YELLOW
CREAT SERPL-MCNC: 0.74 MG/DL (ref 0.52–1.04)
EOSINOPHIL # BLD AUTO: 0.4 10E3/UL (ref 0–0.7)
EOSINOPHIL NFR BLD AUTO: 5 %
ERYTHROCYTE [DISTWIDTH] IN BLOOD BY AUTOMATED COUNT: 11.9 % (ref 10–15)
GFR SERPL CREATININE-BSD FRML MDRD: >90 ML/MIN/1.73M2
GLUCOSE BLD-MCNC: 109 MG/DL (ref 70–99)
GLUCOSE UR STRIP-MCNC: NEGATIVE MG/DL
HCG SERPL QL: NEGATIVE
HCT VFR BLD AUTO: 36.2 % (ref 35–47)
HGB BLD-MCNC: 12 G/DL (ref 11.7–15.7)
HGB UR QL STRIP: ABNORMAL
HOLD SPECIMEN: NORMAL
IMM GRANULOCYTES # BLD: 0 10E3/UL
IMM GRANULOCYTES NFR BLD: 0 %
KETONES UR STRIP-MCNC: NEGATIVE MG/DL
LEUKOCYTE ESTERASE UR QL STRIP: ABNORMAL
LIPASE SERPL-CCNC: 125 U/L (ref 73–393)
LYMPHOCYTES # BLD AUTO: 2.1 10E3/UL (ref 0.8–5.3)
LYMPHOCYTES NFR BLD AUTO: 27 %
MCH RBC QN AUTO: 30.1 PG (ref 26.5–33)
MCHC RBC AUTO-ENTMCNC: 33.1 G/DL (ref 31.5–36.5)
MCV RBC AUTO: 91 FL (ref 78–100)
MONOCYTES # BLD AUTO: 0.4 10E3/UL (ref 0–1.3)
MONOCYTES NFR BLD AUTO: 6 %
MUCOUS THREADS #/AREA URNS LPF: PRESENT /LPF
NEUTROPHILS # BLD AUTO: 4.7 10E3/UL (ref 1.6–8.3)
NEUTROPHILS NFR BLD AUTO: 61 %
NITRATE UR QL: NEGATIVE
NRBC # BLD AUTO: 0 10E3/UL
NRBC BLD AUTO-RTO: 0 /100
PH UR STRIP: 5.5 [PH] (ref 5–7)
PLATELET # BLD AUTO: 244 10E3/UL (ref 150–450)
POTASSIUM BLD-SCNC: 3.7 MMOL/L (ref 3.4–5.3)
PROT SERPL-MCNC: 6.8 G/DL (ref 6.8–8.8)
RBC # BLD AUTO: 3.99 10E6/UL (ref 3.8–5.2)
RBC URINE: 28 /HPF
SODIUM SERPL-SCNC: 136 MMOL/L (ref 133–144)
SP GR UR STRIP: 1.03 (ref 1–1.03)
SQUAMOUS EPITHELIAL: 3 /HPF
UROBILINOGEN UR STRIP-MCNC: NORMAL MG/DL
WBC # BLD AUTO: 7.6 10E3/UL (ref 4–11)
WBC URINE: 16 /HPF

## 2022-12-31 PROCEDURE — 81001 URINALYSIS AUTO W/SCOPE: CPT | Performed by: EMERGENCY MEDICINE

## 2022-12-31 PROCEDURE — 96374 THER/PROPH/DIAG INJ IV PUSH: CPT

## 2022-12-31 PROCEDURE — 76856 US EXAM PELVIC COMPLETE: CPT

## 2022-12-31 PROCEDURE — 83690 ASSAY OF LIPASE: CPT | Performed by: EMERGENCY MEDICINE

## 2022-12-31 PROCEDURE — 80053 COMPREHEN METABOLIC PANEL: CPT | Performed by: EMERGENCY MEDICINE

## 2022-12-31 PROCEDURE — 74177 CT ABD & PELVIS W/CONTRAST: CPT

## 2022-12-31 PROCEDURE — 87086 URINE CULTURE/COLONY COUNT: CPT | Performed by: EMERGENCY MEDICINE

## 2022-12-31 PROCEDURE — 99285 EMERGENCY DEPT VISIT HI MDM: CPT | Mod: 25

## 2022-12-31 PROCEDURE — 96376 TX/PRO/DX INJ SAME DRUG ADON: CPT

## 2022-12-31 PROCEDURE — 250N000009 HC RX 250: Performed by: EMERGENCY MEDICINE

## 2022-12-31 PROCEDURE — 36415 COLL VENOUS BLD VENIPUNCTURE: CPT | Performed by: EMERGENCY MEDICINE

## 2022-12-31 PROCEDURE — 250N000011 HC RX IP 250 OP 636: Performed by: EMERGENCY MEDICINE

## 2022-12-31 PROCEDURE — 96375 TX/PRO/DX INJ NEW DRUG ADDON: CPT

## 2022-12-31 PROCEDURE — 84703 CHORIONIC GONADOTROPIN ASSAY: CPT | Performed by: EMERGENCY MEDICINE

## 2022-12-31 PROCEDURE — 85025 COMPLETE CBC W/AUTO DIFF WBC: CPT | Performed by: EMERGENCY MEDICINE

## 2022-12-31 RX ORDER — HYDROMORPHONE HYDROCHLORIDE 1 MG/ML
0.5 INJECTION, SOLUTION INTRAMUSCULAR; INTRAVENOUS; SUBCUTANEOUS EVERY 30 MIN PRN
Status: DISCONTINUED | OUTPATIENT
Start: 2022-12-31 | End: 2022-12-31 | Stop reason: HOSPADM

## 2022-12-31 RX ORDER — ONDANSETRON 2 MG/ML
4 INJECTION INTRAMUSCULAR; INTRAVENOUS ONCE
Status: COMPLETED | OUTPATIENT
Start: 2022-12-31 | End: 2022-12-31

## 2022-12-31 RX ORDER — IOPAMIDOL 755 MG/ML
75 INJECTION, SOLUTION INTRAVASCULAR ONCE
Status: COMPLETED | OUTPATIENT
Start: 2022-12-31 | End: 2022-12-31

## 2022-12-31 RX ORDER — KETOROLAC TROMETHAMINE 15 MG/ML
15 INJECTION, SOLUTION INTRAMUSCULAR; INTRAVENOUS ONCE
Status: COMPLETED | OUTPATIENT
Start: 2022-12-31 | End: 2022-12-31

## 2022-12-31 RX ADMIN — HYDROMORPHONE HYDROCHLORIDE 0.5 MG: 1 INJECTION, SOLUTION INTRAMUSCULAR; INTRAVENOUS; SUBCUTANEOUS at 10:54

## 2022-12-31 RX ADMIN — SODIUM CHLORIDE 62 ML: 9 INJECTION, SOLUTION INTRAVENOUS at 10:45

## 2022-12-31 RX ADMIN — ONDANSETRON 4 MG: 2 INJECTION INTRAMUSCULAR; INTRAVENOUS at 10:22

## 2022-12-31 RX ADMIN — HYDROMORPHONE HYDROCHLORIDE 0.5 MG: 1 INJECTION, SOLUTION INTRAMUSCULAR; INTRAVENOUS; SUBCUTANEOUS at 09:05

## 2022-12-31 RX ADMIN — KETOROLAC TROMETHAMINE 15 MG: 15 INJECTION, SOLUTION INTRAMUSCULAR; INTRAVENOUS at 09:05

## 2022-12-31 RX ADMIN — IOPAMIDOL 75 ML: 755 INJECTION, SOLUTION INTRAVENOUS at 10:45

## 2022-12-31 ASSESSMENT — ACTIVITIES OF DAILY LIVING (ADL)
ADLS_ACUITY_SCORE: 35
ADLS_ACUITY_SCORE: 35

## 2022-12-31 ASSESSMENT — ENCOUNTER SYMPTOMS
FEVER: 0
VOMITING: 1
NAUSEA: 1
DIARRHEA: 1
ABDOMINAL PAIN: 1
DIAPHORESIS: 1

## 2022-12-31 NOTE — ED NOTES
Bed: ED04  Expected date:   Expected time:   Means of arrival:   Comments:  [phone] Abd Pain after Surg

## 2022-12-31 NOTE — ED PROVIDER NOTES
History   Chief Complaint:  Abdominal Pain (S/P surg last week for endometriosis.)     The history is provided by the patient.      Do Hassan is a 32 year old female with history of emdometriosis who presents with abdominal pain. The patient reports that she had surgery for her endometriosis 10 days ago to remove a cyst, and they removed the entire left ovarie and tube. She has since noticed worsening bleeding as of yesterday, so she called the clinic and they reassured everything was normal as long as there was no severe pain. Last night she had onset of building abdominal pain, which became 10/10 severity this morning. She used marijuana and took one oxycodone which brought the pain from a 10 to an 8. The pain is mostly centralized in her lower abdomen but radiates somewhat to the left side. She also notes diarrhea today, trouble passing gas, night sweats, and one episode of vomiting last night. She denies any chance for pregnancy, and is due for her period at this time. She denies any fever.   She has an appointment for post operative follow up on 1/3.    Review of Systems   Constitutional: Positive for diaphoresis. Negative for fever.   Gastrointestinal: Positive for abdominal pain, diarrhea, nausea and vomiting.   Genitourinary: Positive for vaginal bleeding and vaginal pain.   All other systems reviewed and are negative.    Allergies:  Contrast Dye  Diagnostic X-Ray Materials  Nickel    Medications:  Desyrel   Senna   Inderal   Compazine   Roxicodone   Prilosec   Prozac     Past Medical History:     Óscar-Danlos syndrome type III  Endometriosis   Postural orthostatic tachycardia syndrome   Intussusception   Degenerative disc disease   Scoliosis   Depression   Asthma      Past Surgical History:    Right hip labial repair   Laparoscopic diagnostic   DaVinci assisted ablation/ excision of endometriosis   Cystectomy ovarian benign left   Appendectomy   Anterior posterior spinal fusion     Family History:   "  Mother: hypothyroidism, anorexia nervosa, leaky heart valve   Father: Ashkenazi constipation     Social History:  The patient presents to the ED alone.   PCP: Wendi Fernandez     Physical Exam     Patient Vitals for the past 24 hrs:   BP Temp Temp src Pulse Resp SpO2 Height Weight   12/31/22 1054 113/71 -- -- 97 -- -- -- --   12/31/22 0909 100/62 -- -- 92 -- 97 % -- --   12/31/22 0800 105/66 98.5  F (36.9  C) Temporal 114 20 94 % 1.6 m (5' 3\") 68 kg (150 lb)     Physical Exam  Nursing note and vitals reviewed.  Constitutional:  Appears well-developed and well-nourished.   HENT:   Head:    Atraumatic.   Mouth/Throat:   Oropharynx is clear and moist. No oropharyngeal exudate.   Eyes:    Pupils are equal, round, and reactive to light.   Neck:    Normal range of motion. Neck supple.      No tracheal deviation present. No thyromegaly present.   Cardiovascular:  Normal rate, regular rhythm, no murmur   Pulmonary/Chest: Breath sounds are clear and equal without wheezes or crackles.  Abdominal:   Soft. Bowel sounds are normal. Exhibits no distension and      no mass. There is no tenderness.      There is no rebound and no guarding.   Musculoskeletal:  Exhibits no edema.   Lymphadenopathy:  No cervical adenopathy.   Neurological:   Alert and oriented to person, place, and time.   Skin:    Skin is warm and dry. No rash noted. No pallor.     Emergency Department Course   Imaging:  CT Abdomen Pelvis w Contrast   Final Result   IMPRESSION:    1.  No postoperative fluid collections status post left oophorectomy.   2.  Trace likely physiologic free fluid in the pelvis.   3.  Mild colonic stool burden.      US Pelvis Cmplt w Transvag & Doppler LmtPel Duplex Limited   Final Result   IMPRESSION:     1.  Interval postoperative changes of left oophorectomy.   2.  Mildly prominent vascular structures in the right adnexal region are of uncertain clinical significance. In the appropriate clinical setting, pelvic congestion syndrome " is a diagnostic consideration.            Report per radiology    Laboratory:  Labs Ordered and Resulted from Time of ED Arrival to Time of ED Departure   ROUTINE UA WITH MICROSCOPIC REFLEX TO CULTURE - Abnormal       Result Value    Color Urine Yellow      Appearance Urine Slightly Cloudy (*)     Glucose Urine Negative      Bilirubin Urine Negative      Ketones Urine Negative      Specific Gravity Urine 1.028      Blood Urine Large (*)     pH Urine 5.5      Protein Albumin Urine 30 (*)     Urobilinogen Urine Normal      Nitrite Urine Negative      Leukocyte Esterase Urine Trace (*)     Bacteria Urine Few (*)     Mucus Urine Present (*)     RBC Urine 28 (*)     WBC Urine 16 (*)     Squamous Epithelials Urine 3 (*)    COMPREHENSIVE METABOLIC PANEL - Abnormal    Sodium 136      Potassium 3.7      Chloride 107      Carbon Dioxide (CO2) 25      Anion Gap 4      Urea Nitrogen 14      Creatinine 0.74      Calcium 8.0 (*)     Glucose 109 (*)     Alkaline Phosphatase 43      AST 9      ALT 17      Protein Total 6.8      Albumin 3.6      Bilirubin Total 0.3      GFR Estimate >90     HCG QUALITATIVE PREGNANCY - Normal    hCG Serum Qualitative Negative     LIPASE - Normal    Lipase 125     CBC WITH PLATELETS AND DIFFERENTIAL    WBC Count 7.6      RBC Count 3.99      Hemoglobin 12.0      Hematocrit 36.2      MCV 91      MCH 30.1      MCHC 33.1      RDW 11.9      Platelet Count 244      % Neutrophils 61      % Lymphocytes 27      % Monocytes 6      % Eosinophils 5      % Basophils 1      % Immature Granulocytes 0      NRBCs per 100 WBC 0      Absolute Neutrophils 4.7      Absolute Lymphocytes 2.1      Absolute Monocytes 0.4      Absolute Eosinophils 0.4      Absolute Basophils 0.1      Absolute Immature Granulocytes 0.0      Absolute NRBCs 0.0     URINE CULTURE      Emergency Department Course:     Reviewed:  I reviewed nursing notes, vitals, past medical history and Care Everywhere    Assessments:  0848 I obtained history and  examined the patient as noted above.   1013 I rechecked the patient and explained findings.   1159 I rechecked the patient and we discussed her further testing.  1225 I rechecked the patient and discussed her discharge to home.      Consults:  1217 I spoke with Dr. Naik of the OBGYN service regarding patient's presentation, findings, and plan of care.    Interventions:  0905 Toradol 15 mg IV   0905 Dilaudid 0.5 mg IV   1022 Zofran 4 mg IV   1054 Dilaudid 0.5 mg IV    Disposition:  The patient was discharged to home.     Impression & Plan   Medical Decision Making:  This patient is 10 days postop laparoscopic left oophorectomy and arrives due to pain across her abdomen without definite etiology, however I feel is likely due to a menstrual pain since pelvic ultrasound does not show any sign of ovarian torsion or cyst and her CT is not show any sign of abscess and her urine does not appear infected at this time she is menstruating and her urine appears contaminated.  She does not have any symptoms consistent with UTI.  I discussed with the patient and her OB/GYN physician on-call that the exact cause of her pain is unclear but that I feel she can be safe for discharge considering the negative work-up performed.  The patient and her OB/GYN provider agree and she was told to have close follow-up in OB clinic on Tuesday but to return as needed if she develops any worsening symptoms or any fever vomiting or other concerning problems.  She was told to take Tylenol or ibuprofen as needed for pain and she has a few opioid pain medicines left to use as needed.    Diagnosis:    ICD-10-CM    1. Pelvic pain in female  R10.2           Scribe Disclosure:  Ismael JAY, am serving as a scribe at 8:34 AM on 12/31/2022 to document services personally performed by Lavern Ceja MD based on my observations and the provider's statements to me.            Lavern Ceja MD  12/31/22 6735

## 2023-01-02 LAB — BACTERIA UR CULT: NORMAL

## 2023-01-02 NOTE — RESULT ENCOUNTER NOTE
Final urine culture report is negative.  Adult Negative Urine culture parameters per protocol: Any # Urogenital single or mixed organism, <10,000 col/ml single organism (cath/midstream), and > 3 organisms (No susceptibilities performed).  [facility] Emergency Dept discharge antibiotic prescribed (If applicable): None  Treatment recommendations per [facility] ED Lab Result Urine Culture protocol.

## 2023-01-17 ENCOUNTER — TRANSFERRED RECORDS (OUTPATIENT)
Dept: HEALTH INFORMATION MANAGEMENT | Facility: CLINIC | Age: 33
End: 2023-01-17

## 2023-04-28 NOTE — DISCHARGE NOTE OB - MEDICATION SUMMARY - MEDICATIONS TO CHANGE
Fuchs Dystrophy OU: Recommend clinical observation. Advised use of Alice 128 drops in affected eye if worsening.
I will SWITCH the dose or number of times a day I take the medications listed below when I get home from the hospital:  None
Statement Selected

## 2023-05-08 ENCOUNTER — HEALTH MAINTENANCE LETTER (OUTPATIENT)
Age: 33
End: 2023-05-08

## 2023-05-10 ENCOUNTER — HOSPITAL ENCOUNTER (EMERGENCY)
Facility: CLINIC | Age: 33
Discharge: HOME | End: 2023-05-10
Attending: EMERGENCY MEDICINE | Admitting: EMERGENCY MEDICINE
Payer: COMMERCIAL

## 2023-05-10 ENCOUNTER — APPOINTMENT (OUTPATIENT)
Dept: CT IMAGING | Facility: CLINIC | Age: 33
End: 2023-05-10
Attending: EMERGENCY MEDICINE
Payer: COMMERCIAL

## 2023-05-10 VITALS
SYSTOLIC BLOOD PRESSURE: 138 MMHG | RESPIRATION RATE: 18 BRPM | HEIGHT: 63 IN | DIASTOLIC BLOOD PRESSURE: 83 MMHG | HEART RATE: 80 BPM | BODY MASS INDEX: 25.69 KG/M2 | OXYGEN SATURATION: 99 % | TEMPERATURE: 98.8 F | WEIGHT: 145 LBS

## 2023-05-10 DIAGNOSIS — R55 SYNCOPE, UNSPECIFIED SYNCOPE TYPE: ICD-10-CM

## 2023-05-10 DIAGNOSIS — R93.2 ABNORMAL CT OF LIVER: ICD-10-CM

## 2023-05-10 DIAGNOSIS — R07.89 CHEST DISCOMFORT: ICD-10-CM

## 2023-05-10 LAB
ALBUMIN SERPL BCG-MCNC: 4.5 G/DL (ref 3.5–5.2)
ALP SERPL-CCNC: 56 U/L (ref 35–104)
ALT SERPL W P-5'-P-CCNC: 10 U/L (ref 10–35)
ANION GAP SERPL CALCULATED.3IONS-SCNC: 14 MMOL/L (ref 7–15)
AST SERPL W P-5'-P-CCNC: 11 U/L (ref 10–35)
ATRIAL RATE - MUSE: 93 BPM
BASOPHILS # BLD AUTO: 0.1 10E3/UL (ref 0–0.2)
BASOPHILS NFR BLD AUTO: 1 %
BILIRUB SERPL-MCNC: 0.2 MG/DL
BUN SERPL-MCNC: 9.6 MG/DL (ref 6–20)
CALCIUM SERPL-MCNC: 9.7 MG/DL (ref 8.6–10)
CHLORIDE SERPL-SCNC: 100 MMOL/L (ref 98–107)
CREAT SERPL-MCNC: 0.81 MG/DL (ref 0.51–0.95)
DEPRECATED HCO3 PLAS-SCNC: 23 MMOL/L (ref 22–29)
DIASTOLIC BLOOD PRESSURE - MUSE: NORMAL MMHG
EOSINOPHIL # BLD AUTO: 0.1 10E3/UL (ref 0–0.7)
EOSINOPHIL NFR BLD AUTO: 1 %
ERYTHROCYTE [DISTWIDTH] IN BLOOD BY AUTOMATED COUNT: 12 % (ref 10–15)
GFR SERPL CREATININE-BSD FRML MDRD: >90 ML/MIN/1.73M2
GLUCOSE SERPL-MCNC: 120 MG/DL (ref 70–99)
HCG INTACT+B SERPL-ACNC: <1 MIU/ML
HCT VFR BLD AUTO: 40.5 % (ref 35–47)
HGB BLD-MCNC: 13.7 G/DL (ref 11.7–15.7)
HOLD SPECIMEN: NORMAL
HOLD SPECIMEN: NORMAL
IMM GRANULOCYTES # BLD: 0 10E3/UL
IMM GRANULOCYTES NFR BLD: 0 %
INTERPRETATION ECG - MUSE: NORMAL
LYMPHOCYTES # BLD AUTO: 2.2 10E3/UL (ref 0.8–5.3)
LYMPHOCYTES NFR BLD AUTO: 21 %
MCH RBC QN AUTO: 30.1 PG (ref 26.5–33)
MCHC RBC AUTO-ENTMCNC: 33.8 G/DL (ref 31.5–36.5)
MCV RBC AUTO: 89 FL (ref 78–100)
MONOCYTES # BLD AUTO: 0.5 10E3/UL (ref 0–1.3)
MONOCYTES NFR BLD AUTO: 5 %
NEUTROPHILS # BLD AUTO: 7.5 10E3/UL (ref 1.6–8.3)
NEUTROPHILS NFR BLD AUTO: 72 %
NRBC # BLD AUTO: 0 10E3/UL
NRBC BLD AUTO-RTO: 0 /100
P AXIS - MUSE: 63 DEGREES
PLATELET # BLD AUTO: 381 10E3/UL (ref 150–450)
POTASSIUM SERPL-SCNC: 3.9 MMOL/L (ref 3.4–5.3)
PR INTERVAL - MUSE: 154 MS
PROT SERPL-MCNC: 7.6 G/DL (ref 6.4–8.3)
QRS DURATION - MUSE: 94 MS
QT - MUSE: 372 MS
QTC - MUSE: 462 MS
R AXIS - MUSE: 85 DEGREES
RBC # BLD AUTO: 4.55 10E6/UL (ref 3.8–5.2)
SODIUM SERPL-SCNC: 137 MMOL/L (ref 136–145)
SYSTOLIC BLOOD PRESSURE - MUSE: NORMAL MMHG
T AXIS - MUSE: 56 DEGREES
TROPONIN T SERPL HS-MCNC: <6 NG/L
VENTRICULAR RATE- MUSE: 93 BPM
WBC # BLD AUTO: 10.3 10E3/UL (ref 4–11)

## 2023-05-10 PROCEDURE — 96376 TX/PRO/DX INJ SAME DRUG ADON: CPT | Mod: 59

## 2023-05-10 PROCEDURE — 84484 ASSAY OF TROPONIN QUANT: CPT | Performed by: EMERGENCY MEDICINE

## 2023-05-10 PROCEDURE — 96375 TX/PRO/DX INJ NEW DRUG ADDON: CPT

## 2023-05-10 PROCEDURE — 93005 ELECTROCARDIOGRAM TRACING: CPT

## 2023-05-10 PROCEDURE — 80053 COMPREHEN METABOLIC PANEL: CPT | Performed by: EMERGENCY MEDICINE

## 2023-05-10 PROCEDURE — 74177 CT ABD & PELVIS W/CONTRAST: CPT

## 2023-05-10 PROCEDURE — 250N000011 HC RX IP 250 OP 636: Performed by: EMERGENCY MEDICINE

## 2023-05-10 PROCEDURE — 250N000009 HC RX 250: Performed by: EMERGENCY MEDICINE

## 2023-05-10 PROCEDURE — 99285 EMERGENCY DEPT VISIT HI MDM: CPT | Mod: 25

## 2023-05-10 PROCEDURE — 96361 HYDRATE IV INFUSION ADD-ON: CPT

## 2023-05-10 PROCEDURE — 36415 COLL VENOUS BLD VENIPUNCTURE: CPT | Performed by: EMERGENCY MEDICINE

## 2023-05-10 PROCEDURE — 96374 THER/PROPH/DIAG INJ IV PUSH: CPT | Mod: 59

## 2023-05-10 PROCEDURE — 84702 CHORIONIC GONADOTROPIN TEST: CPT | Performed by: EMERGENCY MEDICINE

## 2023-05-10 PROCEDURE — 258N000003 HC RX IP 258 OP 636: Performed by: EMERGENCY MEDICINE

## 2023-05-10 PROCEDURE — 85025 COMPLETE CBC W/AUTO DIFF WBC: CPT | Performed by: EMERGENCY MEDICINE

## 2023-05-10 RX ORDER — ONDANSETRON 2 MG/ML
4 INJECTION INTRAMUSCULAR; INTRAVENOUS ONCE
Status: COMPLETED | OUTPATIENT
Start: 2023-05-10 | End: 2023-05-10

## 2023-05-10 RX ORDER — DIPHENHYDRAMINE HYDROCHLORIDE 50 MG/ML
25 INJECTION INTRAMUSCULAR; INTRAVENOUS ONCE
Status: COMPLETED | OUTPATIENT
Start: 2023-05-10 | End: 2023-05-10

## 2023-05-10 RX ORDER — IOPAMIDOL 755 MG/ML
80 INJECTION, SOLUTION INTRAVASCULAR ONCE
Status: COMPLETED | OUTPATIENT
Start: 2023-05-10 | End: 2023-05-10

## 2023-05-10 RX ADMIN — IOPAMIDOL 80 ML: 755 INJECTION, SOLUTION INTRAVENOUS at 13:21

## 2023-05-10 RX ADMIN — ONDANSETRON 4 MG: 2 INJECTION INTRAMUSCULAR; INTRAVENOUS at 13:19

## 2023-05-10 RX ADMIN — SODIUM CHLORIDE 80 ML: 9 INJECTION, SOLUTION INTRAVENOUS at 13:21

## 2023-05-10 RX ADMIN — ONDANSETRON 4 MG: 2 INJECTION INTRAMUSCULAR; INTRAVENOUS at 12:21

## 2023-05-10 RX ADMIN — SODIUM CHLORIDE 1000 ML: 9 INJECTION, SOLUTION INTRAVENOUS at 12:16

## 2023-05-10 RX ADMIN — DIPHENHYDRAMINE HYDROCHLORIDE 25 MG: 50 INJECTION, SOLUTION INTRAMUSCULAR; INTRAVENOUS at 13:19

## 2023-05-10 ASSESSMENT — ACTIVITIES OF DAILY LIVING (ADL): ADLS_ACUITY_SCORE: 35

## 2023-05-10 NOTE — ED NOTES
Pt passed road test, able to get up and walk 30ft down the barron and back without assistance. She did report increased headache, chest pressure, and dizziness upon standing

## 2023-05-10 NOTE — ED PROVIDER NOTES
History     Chief Complaint:  Syncope and Chest Pain     The history is provided by the patient.      Do Hassan is a 33 year old female with history of POTS, May-Thurner Syndrome, and renal nutcracker syndrome who presents with syncope and chest pain.     Patient states today at work, where she is a  at [employer], she started feeling dizzy and then passed out. She notes this feels different than her past syncopal episodes. Patient notes she still feels dizzy now when she changes positions. She notes she has also had constant upper chest pain for days worse with breaths which she attributed to having a possible cold. She states she has a history of POTS and presyncope, but hasn't fainted in months. No palpitation or heart monitors given in the past per patient. She reports she takes 325 mg of Aspirin, but no blood thinners. She states she takes the mini-pill for birth control.     She notes she will be getting a stent placed for May-Thurner Syndrome soon she adds.      Independent Historian:   Patient     Review of External Notes: reviewed past notes earlier at [facility] chronic pelvic pain     Allergies:  Contrast Dye  Iodinated Contrast Media  Nickel     Medications:    Prozac  ROBERTO oral contraceptive  Medical cannabis  Prilosec   Roxicodone  Compazine  Inderal   Sodium chloride   Desyrel     Belbuca   Wellbutrin XL  Micronor   Aspirin 325 mg     Past Medical History:    Anxiety   Arthritis   Depression   Óscar-Danlos syndrome type III  Endometriosis   Heartburn   Intussusception   Osteoporosis   POTS   Spina bifida occulta   Substance use disorder   Degenerative disc disease, lumbar   Scoliosis   Medical marijuana use   Asthma     Past Surgical History:    L5-S1 spinal fusion  Appendectomy  Left cystectomy of benign ovarian cyst  DaVinci ablation and excision of endometriosis  Diagnostic laparoscopy  Right hip labial repair    Family History:    Mother- anorexia nervosa, arthritis,  "heart defect   Sister- eating disorder    Social History:  Patient presents to the ED via private vehicle alone.   PCP: Wendi Fernandez     Physical Exam     Patient Vitals for the past 24 hrs:   BP Temp Temp src Pulse Resp SpO2 Height Weight   05/10/23 1614 -- -- -- -- -- 99 % -- --   05/10/23 1609 138/83 -- -- 80 -- 93 % -- --   05/10/23 1153 125/79 98.8  F (37.1  C) Oral 104 18 98 % 1.6 m (5' 3\") 65.8 kg (145 lb)      Physical Exam  Vitals: reviewed by me  General: Pt seen on [facility], cooperative, and alert to conversation  Eyes: Tracking well, clear conjunctiva BL  ENT: MMM, midline trachea.   Lungs: No tachypnea, no accessory muscle use. No respiratory distress.   CV: Rate as above, normal S1-S2, no additional heart sounds noted.  Abd: Soft, non tender, no guarding, no rebound. Non distended  MSK: no joint effusion.  No evidence of trauma  Skin: No rash  Neuro: Clear speech and no facial droop.  Ambulatory with strong steady gait  Psych: Not RIS, no e/o AH/VH    Emergency Department Course   EKG:   ECG results from 05/10/23   EKG 12-lead, tracing only     Value    Systolic Blood Pressure     Diastolic Blood Pressure     Ventricular Rate 93    Atrial Rate 93    OH Interval 154    QRS Duration 94        QTc 462    P Axis 63    R AXIS 85    T Axis 56    Interpretation ECG      Sinus rhythm  Normal ECG  No previous ECGs available       Imaging:  CT Aortic Survey w Contrast   Final Result   Addendum (preliminary) 1 of 1   Addendum: No evidence for thoracoabdominal aortic aneurysm or   dissection. No evidence of pulmonary embolism.      SHAE RUSSELL MD            SYSTEM ID:  [id number]      Final   IMPRESSION:    1. No evidence of thoracoabdominal aortic aneurysm or dissection.   2. 2.4 cm enhancing area in the inferior aspect of the right hepatic   lobe, indeterminate, could represent a perfusional abnormality,   hepatic hemangioma versus other hepatic lesions. This can be further "   evaluated with contrast-enhanced MRI on nonemergent basis.       SHAE RUSSELL MD            SYSTEM ID:  [id number]         Report per radiology    Laboratory:  Labs Ordered and Resulted from Time of ED Arrival to Time of ED Departure   COMPREHENSIVE METABOLIC PANEL - Abnormal       Result Value    Sodium 137      Potassium 3.9      Chloride 100      Carbon Dioxide (CO2) 23      Anion Gap 14      Urea Nitrogen 9.6      Creatinine 0.81      Calcium 9.7      Glucose 120 (*)     Alkaline Phosphatase 56      AST 11      ALT 10      Protein Total 7.6      Albumin 4.5      Bilirubin Total 0.2      GFR Estimate >90     TROPONIN T, HIGH SENSITIVITY - Normal    Troponin T, High Sensitivity <6     HCG QUANTITATIVE PREGNANCY - Normal    hCG Quantitative <1     CBC WITH PLATELETS AND DIFFERENTIAL    WBC Count 10.3      RBC Count 4.55      Hemoglobin 13.7      Hematocrit 40.5      MCV 89      MCH 30.1      MCHC 33.8      RDW 12.0      Platelet Count 381      % Neutrophils 72      % Lymphocytes 21      % Monocytes 5      % Eosinophils 1      % Basophils 1      % Immature Granulocytes 0      NRBCs per 100 WBC 0      Absolute Neutrophils 7.5      Absolute Lymphocytes 2.2      Absolute Monocytes 0.5      Absolute Eosinophils 0.1      Absolute Basophils 0.1      Absolute Immature Granulocytes 0.0      Absolute NRBCs 0.0        Emergency Department Course & Assessments:     Interventions:  Medications   0.9% sodium chloride BOLUS (0 mLs Intravenous Stopped 5/10/23 1624)   ondansetron (ZOFRAN) injection 4 mg (4 mg Intravenous $Given 5/10/23 1221)   diphenhydrAMINE (BENADRYL) injection 25 mg (25 mg Intravenous $Given 5/10/23 1319)   ondansetron (ZOFRAN) injection 4 mg (4 mg Intravenous $Given 5/10/23 1319)   iopamidol (ISOVUE-370) solution 80 mL (80 mLs Intravenous $Given 5/10/23 1321)   100mL Saline Flush (80 mLs Intravenous $Given 5/10/23 1321)      Independent Interpretation (X-rays, CTs, rhythm strip):  None      Assessments/Consultations/Discussion of Management or Tests:   ED Course as of 05/10/23 1711   Wed May 10, 2023   1617 I obtained history and examined the patient as noted above.    1630 I spoke with radiology for patient. He reports no PE on CT.    1656 I rechecked the patient and explained findings. Tolerated road test well, no dizziness.     Social Determinants of Health affecting care:   None    Disposition:  The patient was discharged to home.     Impression & Plan      Medical Decision Making:  This is a very pleasant 33-year-old female who presents emergency room after a syncopal episode that happened today at work.  This was nonexertional, and did not seem like it was a seizure.  This only happened once and she has been back to normal since then.  She also has an additional complaint of central chest tightness and discomfort this been present for about 3 days, not clearly associated with the fainting episode today.  She has some vascular history, but based on her CT scan, my conversation with the radiologist, which allowed me to effectively rule out pulmonary embolism as well, and based on her troponin, no emergent cause of chest pain was found here today.  Her EKG does not show any evidence of arrhythmogenic syncope or cardiogenic syncope, and I do think that she stable to discharge home.  It sounds like she does have some history of palpitations, but I do think that she is stable to follow with her regular doctor in a week ahead.  She seems to be highly healthcare literate, highly motivated to participate in her own care, and quite reliable appearing.  I do think she will come back to the ER if anything gets worse.    8:44 PM  Left voicemail noting incidental finding on liver, was also on the paperwork that the patient received    Diagnosis:    ICD-10-CM    1. Syncope, unspecified syncope type  R55       2. Chest discomfort  R07.89       3. Abnormal CT of liver  R93.2            Discharge  Medications:  Discharge Medication List as of 5/10/2023  5:01 PM         I, Niecy Baig, am serving as a scribe at 4:39 PM on 5/10/2023 to document services personally performed by Dr. Little, based on my observations and the provider's statements to me.     5/10/2023   Kendell Little MD Fitzgerald, Michael Maxwell Kreofsky, MD  05/10/23 2044

## 2023-05-10 NOTE — ED TRIAGE NOTES
Patient at work today and passed out. States this has happened before but this time is different and endorses chest pain and headache.    Patient states that she has May-Theurners Syndrome. Is supposed to have a surgery for it soon as it is really severe.

## 2023-05-10 NOTE — DISCHARGE INSTRUCTIONS
As we discussed, no clear cause of your fainting was found today, and no clear cause of your chest pain was found today.  Your EKG is reassuring, the CT scan of your chest did not show any vascular problems or blood clots, and your blood work shows us that it does not look like you are having a heart attack.  Your vital signs are within normal limits here and you have tolerated our ambulation trial around the ER without any dizziness or near fainting spells.  Again we are not sure why you felt dizzy, but given your history it is extremely important that you follow-up with your regular doctor in the next 1 week.  Please come back to the ER immediately with any other concerns you have or any new questions.

## 2023-05-12 ENCOUNTER — TRANSFERRED RECORDS (OUTPATIENT)
Dept: HEALTH INFORMATION MANAGEMENT | Facility: CLINIC | Age: 33
End: 2023-05-12
Payer: COMMERCIAL

## 2023-05-12 ENCOUNTER — MEDICAL CORRESPONDENCE (OUTPATIENT)
Dept: HEALTH INFORMATION MANAGEMENT | Facility: CLINIC | Age: 33
End: 2023-05-12
Payer: COMMERCIAL

## 2023-05-12 DIAGNOSIS — R00.2 PALPITATIONS: Primary | ICD-10-CM

## 2023-05-12 DIAGNOSIS — R55 SYNCOPE: ICD-10-CM

## 2023-05-15 ENCOUNTER — HOSPITAL ENCOUNTER (OUTPATIENT)
Dept: CARDIOLOGY | Facility: CLINIC | Age: 33
Discharge: HOME | End: 2023-05-15
Attending: FAMILY MEDICINE
Payer: COMMERCIAL

## 2023-05-15 DIAGNOSIS — R55 SYNCOPE: ICD-10-CM

## 2023-05-15 DIAGNOSIS — R00.2 PALPITATIONS: ICD-10-CM

## 2023-05-15 LAB — LVEF ECHO: NORMAL

## 2023-05-15 PROCEDURE — 93306 TTE W/DOPPLER COMPLETE: CPT | Mod: 26 | Performed by: INTERNAL MEDICINE

## 2023-05-15 PROCEDURE — 93242 EXT ECG>48HR<7D RECORDING: CPT

## 2023-05-15 PROCEDURE — 93306 TTE W/DOPPLER COMPLETE: CPT

## 2023-05-15 PROCEDURE — 93244 EXT ECG>48HR<7D REV&INTERPJ: CPT | Performed by: INTERNAL MEDICINE

## 2023-05-18 ENCOUNTER — OFFICE VISIT (OUTPATIENT)
Dept: CARDIOLOGY | Facility: CLINIC | Age: 33
End: 2023-05-18
Payer: COMMERCIAL

## 2023-05-18 VITALS
SYSTOLIC BLOOD PRESSURE: 118 MMHG | HEART RATE: 104 BPM | WEIGHT: 149.9 LBS | HEIGHT: 64 IN | DIASTOLIC BLOOD PRESSURE: 68 MMHG | OXYGEN SATURATION: 98 % | BODY MASS INDEX: 25.59 KG/M2

## 2023-05-18 DIAGNOSIS — R00.2 PALPITATIONS: Primary | ICD-10-CM

## 2023-05-18 DIAGNOSIS — R07.1 CHEST PAIN ON BREATHING: ICD-10-CM

## 2023-05-18 PROCEDURE — 99203 OFFICE O/P NEW LOW 30 MIN: CPT | Performed by: INTERNAL MEDICINE

## 2023-05-18 RX ORDER — ASPIRIN 325 MG
81 TABLET ORAL DAILY
COMMUNITY
End: 2023-08-03

## 2023-05-18 RX ORDER — BUPROPION HYDROCHLORIDE 300 MG/1
TABLET ORAL
COMMUNITY
Start: 2023-04-25

## 2023-05-18 RX ORDER — BUPROPION HYDROCHLORIDE 150 MG/1
TABLET ORAL
COMMUNITY
Start: 2023-04-21

## 2023-05-18 NOTE — LETTER
5/18/2023    Wendi Fernandez, APRN CNP  [address]  [facility] [address]    RE: Do HERNANDEZ Mo       Dear Colleague,     I had the pleasure of seeing Do Hassan in the [facility].  CARDIOLOGY CLINIC CONSULTATION    PRIMARY CARE PHYSICIAN:  Wendi Fernandez    Tests reviewed/interpreted independently in clinic today:   EKG: Sinus rhythm  Echocardiogram: Normal study  Blood work: Reviewed CBC, BMP, ultrasound     The level of medical decision making during this visit was of moderate complexity.     HISTORY OF PRESENT ILLNESS:  Today, I had the pleasure of connecting with Do Hassan.   She is a very pleasant 33-year-old lady who works in as a  in our hospital.  She presents to the clinic in initial consultation after being seen in the emergency department for an episode of syncope and chest pain.    She was at work when she became lightheaded and sustained an episode of syncope.  She was taken to the emergency department where they did a basic work-up all of which was negative.  She continued to feel sick for 2 to 3 hours after the episode.  She described this as nausea, vomiting, malaise, and feeling of overall being well.  She has since undergone a transthoracic echocardiogram which is essentially a normal study.  She also currently has rhythm monitor on.    Today, she tells me that she has been having the chest pain on and off for the past 2 weeks.  The symptoms started after she had an upper respiratory tract infection.  Pain is nonexertional, can come on suddenly and disappears in a couple of minutes without any significant intervention.  Symptoms get worse with deep breathing.       ASSESSMENT: Pertinent issues addressed/ reviewed during this cardiology visit    POTS  Syncope-secondary to dehydration, orthostatic hypotension  Atypical chest pain    RECOMMENDATIONS:  The patient has a history of POTS but has been not been symptomatic from that standpoint  for the past some time.  She had previously been toldthat she no longer has POTS.  I do not believe there is any need to restart propranolol at this time.  If anything we are trying to keep her blood pressure on the higher side to prevent future syncopal episode.  As far as the episode of syncope is concerned, I am not exactly sure what the etiology was.  The most likely causes  Pending or dehydration, orthostatic hypotension versus vasovagal syncope.  The patient insists that the episode was very different than what she used to experience with POTS and I am in agreement with her and believing that this likely is POTS related..  Moreover the echocardiogram did show evidence of dehydration.  We discussed the basic lifestyle modifications and precautions she can take to prevent further episodes.  This includes  eating small meals, cutting back on carbohydrate, increasing salt intake, keeping herself hydrated, using compression stockings and avoiding prolonged standing.  As far as atypical chest pain is concerned we talked about possibility of pleuritis/pericarditis.  I have ordered CRP and ESR and if they are positive we will try a short course of colchicine.  Also talked about performing a stress EKG but she is not interested.    Orders Placed This Encounter   Procedures    Erythrocyte sedimentation rate auto    CRP inflammation       PAST MEDICAL HISTORY:  Past Medical History:   Diagnosis Date    Anxiety     Arthritis     spine    Depression     Óscar-Danlos syndrome     Endometriosis     Endometriosis 02/2017    evasion of endometriosis    Heartburn     Intussusception (H) 1996    Osteoporosis     POTS (postural orthostatic tachycardia syndrome)     Spina bifida occulta     Substance abuse (H)        MEDICATIONS:  Current Outpatient Medications   Medication    aspirin (ASA) 325 MG tablet    buPROPion (WELLBUTRIN XL) 150 MG 24 hr tablet    buPROPion (WELLBUTRIN XL) 300 MG 24 hr tablet    FLUoxetine (PROZAC) 40 MG  capsule    ROBERTO 0.35 MG tablet    medical cannabis (Patient's own supply)    Omeprazole Magnesium (PRILOSEC PO)    oxyCODONE (ROXICODONE) 5 MG tablet    SENNA-docusate sodium (SENNA S) 8.6-50 MG tablet    traZODone (DESYREL) 50 MG tablet     No current facility-administered medications for this visit.       ALLERGIES:  Allergies   Allergen Reactions    Contrast Dye Nausea    Iodinated Contrast Media Nausea and Vomiting     Iodine contrast causes vomiting per pt report    Nickel      Other reaction(s): Unknown       SOCIAL HISTORY:  I have reviewed this patient's social history and updated it with pertinent information if needed. Do Hassan  reports that she quit smoking about 11 years ago. Her smoking use included cigarettes. She has never used smokeless tobacco. She reports that she does not currently use alcohol. She reports that she does not currently use drugs.    FAMILY HISTORY:  I have reviewed this patient's family history and updated it with pertinent information if needed.   Family History   Problem Relation Age of Onset    Arthritis Mother         hypothyroidism    Anorexia nervosa Mother     Heart Defect Mother         Leaky valve; possible heart failure related to anorexia    Constipation Father         Ashkenazi    Eating Disorder Sister     Hypothyroidism Maternal Grandmother     Osteoporosis Maternal Grandmother     Cancer Maternal Grandfather     Dementia Paternal Grandmother     Cerebrovascular Disease Paternal Grandfather     Alcoholism Paternal Grandfather        REVIEW OF SYSTEMS:  Skin:  not assessed     Eyes:  not assessed    ENT:  not assessed    Respiratory:  Positive for dyspnea on exertion;dyspnea at rest  Cardiovascular:    Positive for;chest pain;palpitations;fatigue;lightheadedness;dizziness  Gastroenterology: not assessed    Genitourinary:  not assessed    Musculoskeletal:  not assessed    Neurologic:  not assessed    Psychiatric:  not assessed    Heme/Lymph/Imm:  not assessed     Endocrine:  not assessed        PHYSICAL EXAM:      BP: 118/68 Pulse: 104     SpO2: 98 %      Vital Signs with Ranges  Pulse:  [104] 104  BP: (118)/(68) 118/68  SpO2:  [98 %] 98 %  149 lbs 14.4 oz    Constitutional: alert, no distress  Respiratory: Good bilateral air entry  Cardiovascular: Normal S1-S2, no murmurs  GI: nondistended  Neuropsychiatric: appropriate affact    It was a pleasure seeing this patient in clinic today. Please do not hesitate to contact me with any future questions.     Manfred SLOAN, FACC, FASE  Cardiology - [facility]  May 18, 2023    This note was completed in part using dictation via the Dragon voice recognition software. Some word and grammatical errors may occur and must be interpreted in the appropriate clinical context.  If there are any questions pertaining to this issue, please contact me for further clarification.    Thank you for allowing me to participate in the care of your patient.      Sincerely,   Manfred Henry MD   [facility]  cc:   Nancy Giles PA-C  [address]  [place] [address]

## 2023-05-18 NOTE — PROGRESS NOTES
CARDIOLOGY CLINIC CONSULTATION    PRIMARY CARE PHYSICIAN:  Wendi Fernandez    Tests reviewed/interpreted independently in clinic today:   1. EKG: Sinus rhythm  2. Echocardiogram: Normal study  3. Blood work: Reviewed CBC, BMP, ultrasound     The level of medical decision making during this visit was of moderate complexity.     HISTORY OF PRESENT ILLNESS:  Today, I had the pleasure of connecting with Do Hassan.   She is a very pleasant 33-year-old lady who works in as a  in our hospital.  She presents to the clinic in initial consultation after being seen in the emergency department for an episode of syncope and chest pain.    She was at work when she became lightheaded and sustained an episode of syncope.  She was taken to the emergency department where they did a basic work-up all of which was negative.  She continued to feel sick for 2 to 3 hours after the episode.  She described this as nausea, vomiting, malaise, and feeling of overall being well.  She has since undergone a transthoracic echocardiogram which is essentially a normal study.  She also currently has rhythm monitor on.    Today, she tells me that she has been having the chest pain on and off for the past 2 weeks.  The symptoms started after she had an upper respiratory tract infection.  Pain is nonexertional, can come on suddenly and disappears in a couple of minutes without any significant intervention.  Symptoms get worse with deep breathing.       ASSESSMENT: Pertinent issues addressed/ reviewed during this cardiology visit    1. POTS  2. Syncope-secondary to dehydration, orthostatic hypotension  3. Atypical chest pain    RECOMMENDATIONS:  1. The patient has a history of POTS but has been not been symptomatic from that standpoint for the past some time.  She had previously been toldthat she no longer has POTS.  I do not believe there is any need to restart propranolol at this time.  If anything we are trying to keep her blood  pressure on the higher side to prevent future syncopal episode.  2. As far as the episode of syncope is concerned, I am not exactly sure what the etiology was.  The most likely causes  3. Pending or dehydration, orthostatic hypotension versus vasovagal syncope.  The patient insists that the episode was very different than what she used to experience with POTS and I am in agreement with her and believing that this likely is POTS related..  Moreover the echocardiogram did show evidence of dehydration.  We discussed the basic lifestyle modifications and precautions she can take to prevent further episodes.  This includes  eating small meals, cutting back on carbohydrate, increasing salt intake, keeping herself hydrated, using compression stockings and avoiding prolonged standing.  4. As far as atypical chest pain is concerned we talked about possibility of pleuritis/pericarditis.  I have ordered CRP and ESR and if they are positive we will try a short course of colchicine.  Also talked about performing a stress EKG but she is not interested.    Orders Placed This Encounter   Procedures     Erythrocyte sedimentation rate auto     CRP inflammation       PAST MEDICAL HISTORY:  Past Medical History:   Diagnosis Date     Anxiety      Arthritis     spine     Depression      Óscar-Danlos syndrome      Endometriosis      Endometriosis 02/2017    evasion of endometriosis     Heartburn      Intussusception (H) 1996     Osteoporosis      POTS (postural orthostatic tachycardia syndrome)      Spina bifida occulta      Substance abuse (H)        MEDICATIONS:  Current Outpatient Medications   Medication     aspirin (ASA) 325 MG tablet     buPROPion (WELLBUTRIN XL) 150 MG 24 hr tablet     buPROPion (WELLBUTRIN XL) 300 MG 24 hr tablet     FLUoxetine (PROZAC) 40 MG capsule     ROBERTO 0.35 MG tablet     medical cannabis (Patient's own supply)     Omeprazole Magnesium (PRILOSEC PO)     oxyCODONE (ROXICODONE) 5 MG tablet      SENNA-docusate sodium (SENNA S) 8.6-50 MG tablet     traZODone (DESYREL) 50 MG tablet     No current facility-administered medications for this visit.       ALLERGIES:  Allergies   Allergen Reactions     Contrast Dye Nausea     Iodinated Contrast Media Nausea and Vomiting     Iodine contrast causes vomiting per pt report     Nickel      Other reaction(s): Unknown       SOCIAL HISTORY:  I have reviewed this patient's social history and updated it with pertinent information if needed. Do Hassan  reports that she quit smoking about 11 years ago. Her smoking use included cigarettes. She has never used smokeless tobacco. She reports that she does not currently use alcohol. She reports that she does not currently use drugs.    FAMILY HISTORY:  I have reviewed this patient's family history and updated it with pertinent information if needed.   Family History   Problem Relation Age of Onset     Arthritis Mother         hypothyroidism     Anorexia nervosa Mother      Heart Defect Mother         Leaky valve; possible heart failure related to anorexia     Constipation Father         Ashkenazi     Eating Disorder Sister      Hypothyroidism Maternal Grandmother      Osteoporosis Maternal Grandmother      Cancer Maternal Grandfather      Dementia Paternal Grandmother      Cerebrovascular Disease Paternal Grandfather      Alcoholism Paternal Grandfather        REVIEW OF SYSTEMS:  Skin:  not assessed     Eyes:  not assessed    ENT:  not assessed    Respiratory:  Positive for dyspnea on exertion;dyspnea at rest  Cardiovascular:    Positive for;chest pain;palpitations;fatigue;lightheadedness;dizziness  Gastroenterology: not assessed    Genitourinary:  not assessed    Musculoskeletal:  not assessed    Neurologic:  not assessed    Psychiatric:  not assessed    Heme/Lymph/Imm:  not assessed    Endocrine:  not assessed        PHYSICAL EXAM:      BP: 118/68 Pulse: 104     SpO2: 98 %      Vital Signs with Ranges  Pulse:  [104]  104  BP: (118)/(68) 118/68  SpO2:  [98 %] 98 %  149 lbs 14.4 oz    Constitutional: alert, no distress  Respiratory: Good bilateral air entry  Cardiovascular: Normal S1-S2, no murmurs  GI: nondistended  Neuropsychiatric: appropriate affact    It was a pleasure seeing this patient in clinic today. Please do not hesitate to contact me with any future questions.     Manfred SLOAN, FACC, [facility]  Cardiology - [facility]  May 18, 2023    This note was completed in part using dictation via the Dragon voice recognition software. Some word and grammatical errors may occur and must be interpreted in the appropriate clinical context.  If there are any questions pertaining to this issue, please contact me for further clarification.

## 2023-05-22 ENCOUNTER — MYC MEDICAL ADVICE (OUTPATIENT)
Dept: CARDIOLOGY | Facility: CLINIC | Age: 33
End: 2023-05-22
Payer: COMMERCIAL

## 2023-05-22 ENCOUNTER — TELEPHONE (OUTPATIENT)
Dept: CARDIOLOGY | Facility: CLINIC | Age: 33
End: 2023-05-22
Payer: COMMERCIAL

## 2023-05-22 NOTE — TELEPHONE ENCOUNTER
Gordon message received from Od.  She did not get the recommended labs from the appointment she just had with Dr. Henry.  She said that the pain is resolved now, and that she is not sure that she needs to have the labs drawn.    Writer will route to Dr. Henry for recommendations.    Annmarie Vargas RN on 5/22/2023 at 12:06 PM

## 2023-05-22 NOTE — TELEPHONE ENCOUNTER
Gordon message received from Do.  She did not get the recommended labs from the appointment she just had with Dr. Henry.  She said that the pain is resolved now, and that she is not sure that she needs to have the labs drawn.    Routing to Dr. Henry for recommendations.

## 2023-05-22 NOTE — ED ADULT NURSE NOTE - [facility] ED NUR TIMING2
No protocol. Patient reporting only 1 tab left. Surgical Defect Width In Cm (Optional): 1.4 constant Advancement Flap (Single) Text: The defect edges were debeveled with a #15 scalpel blade.  Given the location of the defect and the proximity to free margins a single advancement flap was deemed most appropriate.  Using a sterile surgical marker, an appropriate advancement flap was drawn incorporating the defect and placing the expected incisions within the relaxed skin tension lines where possible.    The area thus outlined was incised deep to adipose tissue with a #15 scalpel blade.  The skin margins were undermined to an appropriate distance in all directions utilizing iris scissors.

## 2023-06-08 ENCOUNTER — TRANSFERRED RECORDS (OUTPATIENT)
Dept: HEALTH INFORMATION MANAGEMENT | Facility: CLINIC | Age: 33
End: 2023-06-08
Payer: COMMERCIAL

## 2023-06-26 ENCOUNTER — ANCILLARY PROCEDURE (OUTPATIENT)
Dept: CT IMAGING | Facility: CLINIC | Age: 33
End: 2023-06-26
Attending: INTERNAL MEDICINE
Payer: COMMERCIAL

## 2023-06-26 ENCOUNTER — OFFICE VISIT (OUTPATIENT)
Dept: URGENT CARE | Facility: URGENT CARE | Age: 33
End: 2023-06-26

## 2023-06-26 ENCOUNTER — ANCILLARY PROCEDURE (OUTPATIENT)
Dept: ULTRASOUND IMAGING | Facility: CLINIC | Age: 33
End: 2023-06-26
Attending: INTERNAL MEDICINE
Payer: COMMERCIAL

## 2023-06-26 ENCOUNTER — OFFICE VISIT (OUTPATIENT)
Dept: PEDIATRICS | Facility: CLINIC | Age: 33
End: 2023-06-26
Payer: COMMERCIAL

## 2023-06-26 VITALS
WEIGHT: 150 LBS | TEMPERATURE: 99.3 F | BODY MASS INDEX: 25.75 KG/M2 | OXYGEN SATURATION: 95 % | DIASTOLIC BLOOD PRESSURE: 82 MMHG | HEART RATE: 96 BPM | SYSTOLIC BLOOD PRESSURE: 118 MMHG

## 2023-06-26 VITALS
DIASTOLIC BLOOD PRESSURE: 76 MMHG | SYSTOLIC BLOOD PRESSURE: 124 MMHG | HEART RATE: 87 BPM | OXYGEN SATURATION: 98 % | TEMPERATURE: 98.4 F

## 2023-06-26 DIAGNOSIS — M54.2 NECK PAIN: Primary | ICD-10-CM

## 2023-06-26 DIAGNOSIS — S13.4XXA WHIPLASH INJURY TO NECK, INITIAL ENCOUNTER: ICD-10-CM
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
## 2023-06-26 DIAGNOSIS — R11.0 NAUSEA: ICD-10-CM

## 2023-06-26 DIAGNOSIS — Q79.60 EHLERS-DANLOS SYNDROME: ICD-10-CM

## 2023-06-26 DIAGNOSIS — S13.4XXA WHIPLASH INJURY TO NECK, INITIAL ENCOUNTER: Primary | ICD-10-CM

## 2023-06-26 DIAGNOSIS — I87.1 MAY-THURNER SYNDROME: Primary | ICD-10-CM

## 2023-06-26 LAB — HCG UR QL: NEGATIVE

## 2023-06-26 PROCEDURE — 99207 PR FIRST ORDER ACUTE REFERRAL: CPT

## 2023-06-26 PROCEDURE — 99214 OFFICE O/P EST MOD 30 MIN: CPT | Mod: 25 | Performed by: INTERNAL MEDICINE

## 2023-06-26 PROCEDURE — 96374 THER/PROPH/DIAG INJ IV PUSH: CPT | Performed by: INTERNAL MEDICINE

## 2023-06-26 PROCEDURE — 70450 CT HEAD/BRAIN W/O DYE: CPT

## 2023-06-26 PROCEDURE — 96375 TX/PRO/DX INJ NEW DRUG ADDON: CPT | Performed by: INTERNAL MEDICINE

## 2023-06-26 PROCEDURE — 76705 ECHO EXAM OF ABDOMEN: CPT

## 2023-06-26 PROCEDURE — 70498 CT ANGIOGRAPHY NECK: CPT

## 2023-06-26 PROCEDURE — 70496 CT ANGIOGRAPHY HEAD: CPT

## 2023-06-26 PROCEDURE — 250N000011 HC RX IP 250 OP 636: Mod: JZ | Performed by: INTERNAL MEDICINE

## 2023-06-26 PROCEDURE — 81025 URINE PREGNANCY TEST: CPT | Performed by: INTERNAL MEDICINE

## 2023-06-26 RX ORDER — ACETAMINOPHEN 500 MG
500 TABLET ORAL ONCE
Status: COMPLETED | OUTPATIENT
Start: 2023-06-26 | End: 2023-06-26

## 2023-06-26 RX ORDER — ONDANSETRON 2 MG/ML
4 INJECTION INTRAMUSCULAR; INTRAVENOUS ONCE
Status: COMPLETED | OUTPATIENT
Start: 2023-06-26 | End: 2023-06-26

## 2023-06-26 RX ORDER — DIPHENHYDRAMINE HYDROCHLORIDE 50 MG/ML
25 INJECTION INTRAMUSCULAR; INTRAVENOUS ONCE
Status: COMPLETED | OUTPATIENT
Start: 2023-06-26 | End: 2023-06-26

## 2023-06-26 RX ORDER — IOPAMIDOL 755 MG/ML
75 INJECTION, SOLUTION INTRAVASCULAR ONCE
Status: COMPLETED | OUTPATIENT
Start: 2023-06-26 | End: 2023-06-26

## 2023-06-26 RX ADMIN — DIPHENHYDRAMINE HYDROCHLORIDE 25 MG: 50 INJECTION INTRAMUSCULAR; INTRAVENOUS at 13:59

## 2023-06-26 RX ADMIN — Medication 500 MG: at 14:28

## 2023-06-26 RX ADMIN — IOPAMIDOL 75 ML: 755 INJECTION, SOLUTION INTRAVENOUS at 14:27

## 2023-06-26 RX ADMIN — ONDANSETRON 4 MG: 2 INJECTION INTRAMUSCULAR; INTRAVENOUS at 13:59
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
## 2023-07-10 ENCOUNTER — TRANSFERRED RECORDS (OUTPATIENT)
Dept: HEALTH INFORMATION MANAGEMENT | Facility: CLINIC | Age: 33
End: 2023-07-10
Payer: COMMERCIAL

## 2023-07-19 ENCOUNTER — APPOINTMENT (OUTPATIENT)
Dept: ULTRASOUND IMAGING | Facility: CLINIC | Age: 33
End: 2023-07-19
Attending: EMERGENCY MEDICINE
Payer: COMMERCIAL

## 2023-07-19 ENCOUNTER — APPOINTMENT (OUTPATIENT)
Dept: CT IMAGING | Facility: CLINIC | Age: 33
End: 2023-07-19
Attending: EMERGENCY MEDICINE
Payer: COMMERCIAL

## 2023-07-19 ENCOUNTER — HOSPITAL ENCOUNTER (EMERGENCY)
Facility: CLINIC | Age: 33
Discharge: HOME | End: 2023-07-19
Attending: EMERGENCY MEDICINE | Admitting: EMERGENCY MEDICINE
Payer: COMMERCIAL

## 2023-07-19 VITALS
OXYGEN SATURATION: 95 % | HEART RATE: 116 BPM | BODY MASS INDEX: 26.58 KG/M2 | SYSTOLIC BLOOD PRESSURE: 112 MMHG | TEMPERATURE: 98.4 F | HEIGHT: 63 IN | WEIGHT: 150 LBS | DIASTOLIC BLOOD PRESSURE: 72 MMHG | RESPIRATION RATE: 18 BRPM

## 2023-07-19 DIAGNOSIS — R10.2 PELVIC PAIN IN FEMALE: ICD-10-CM

## 2023-07-19 DIAGNOSIS — K59.00 CONSTIPATION, UNSPECIFIED CONSTIPATION TYPE: ICD-10-CM

## 2023-07-19 LAB
ALBUMIN SERPL BCG-MCNC: 4.5 G/DL (ref 3.5–5.2)
ALBUMIN UR-MCNC: NEGATIVE MG/DL
ALP SERPL-CCNC: 46 U/L (ref 35–104)
ALT SERPL W P-5'-P-CCNC: 15 U/L (ref 0–50)
ANION GAP SERPL CALCULATED.3IONS-SCNC: 10 MMOL/L (ref 7–15)
APPEARANCE UR: CLEAR
AST SERPL W P-5'-P-CCNC: 17 U/L (ref 0–45)
BACTERIA #/AREA URNS HPF: ABNORMAL /HPF
BASOPHILS # BLD AUTO: 0.1 10E3/UL (ref 0–0.2)
BASOPHILS NFR BLD AUTO: 1 %
BILIRUB SERPL-MCNC: 0.3 MG/DL
BILIRUB UR QL STRIP: NEGATIVE
BUN SERPL-MCNC: 12.2 MG/DL (ref 6–20)
CALCIUM SERPL-MCNC: 9.4 MG/DL (ref 8.6–10)
CHLORIDE SERPL-SCNC: 102 MMOL/L (ref 98–107)
COLOR UR AUTO: ABNORMAL
CREAT SERPL-MCNC: 0.9 MG/DL (ref 0.51–0.95)
DEPRECATED HCO3 PLAS-SCNC: 27 MMOL/L (ref 22–29)
EOSINOPHIL # BLD AUTO: 0.4 10E3/UL (ref 0–0.7)
EOSINOPHIL NFR BLD AUTO: 5 %
ERYTHROCYTE [DISTWIDTH] IN BLOOD BY AUTOMATED COUNT: 12.1 % (ref 10–15)
GFR SERPL CREATININE-BSD FRML MDRD: 86 ML/MIN/1.73M2
GLUCOSE SERPL-MCNC: 107 MG/DL (ref 70–99)
GLUCOSE UR STRIP-MCNC: NEGATIVE MG/DL
HCG UR QL: NEGATIVE
HCT VFR BLD AUTO: 39 % (ref 35–47)
HGB BLD-MCNC: 13 G/DL (ref 11.7–15.7)
HGB UR QL STRIP: NEGATIVE
IMM GRANULOCYTES # BLD: 0 10E3/UL
IMM GRANULOCYTES NFR BLD: 0 %
KETONES UR STRIP-MCNC: NEGATIVE MG/DL
LEUKOCYTE ESTERASE UR QL STRIP: NEGATIVE
LYMPHOCYTES # BLD AUTO: 2.9 10E3/UL (ref 0.8–5.3)
LYMPHOCYTES NFR BLD AUTO: 36 %
MCH RBC QN AUTO: 30.2 PG (ref 26.5–33)
MCHC RBC AUTO-ENTMCNC: 33.3 G/DL (ref 31.5–36.5)
MCV RBC AUTO: 91 FL (ref 78–100)
MONOCYTES # BLD AUTO: 0.5 10E3/UL (ref 0–1.3)
MONOCYTES NFR BLD AUTO: 6 %
NEUTROPHILS # BLD AUTO: 4.2 10E3/UL (ref 1.6–8.3)
NEUTROPHILS NFR BLD AUTO: 52 %
NITRATE UR QL: NEGATIVE
NRBC # BLD AUTO: 0 10E3/UL
NRBC BLD AUTO-RTO: 0 /100
PH UR STRIP: 6 [PH] (ref 5–7)
PLATELET # BLD AUTO: 316 10E3/UL (ref 150–450)
POTASSIUM SERPL-SCNC: 3.8 MMOL/L (ref 3.4–5.3)
PROT SERPL-MCNC: 7.2 G/DL (ref 6.4–8.3)
RBC # BLD AUTO: 4.3 10E6/UL (ref 3.8–5.2)
RBC URINE: <1 /HPF
SODIUM SERPL-SCNC: 139 MMOL/L (ref 136–145)
SP GR UR STRIP: 1.01 (ref 1–1.03)
SQUAMOUS EPITHELIAL: 1 /HPF
UROBILINOGEN UR STRIP-MCNC: NORMAL MG/DL
WBC # BLD AUTO: 8.1 10E3/UL (ref 4–11)
WBC URINE: 1 /HPF

## 2023-07-19 PROCEDURE — 99207 US PELVIS COMPLETE W TRANSVAGINAL AND DOPPLER LIMITED: CPT | Mod: 26 | Performed by: STUDENT IN AN ORGANIZED HEALTH CARE EDUCATION/TRAINING PROGRAM

## 2023-07-19 PROCEDURE — 250N000011 HC RX IP 250 OP 636: Performed by: EMERGENCY MEDICINE

## 2023-07-19 PROCEDURE — 99285 EMERGENCY DEPT VISIT HI MDM: CPT | Mod: 25 | Performed by: EMERGENCY MEDICINE

## 2023-07-19 PROCEDURE — 258N000003 HC RX IP 258 OP 636: Performed by: EMERGENCY MEDICINE

## 2023-07-19 PROCEDURE — 76830 TRANSVAGINAL US NON-OB: CPT | Mod: 26 | Performed by: STUDENT IN AN ORGANIZED HEALTH CARE EDUCATION/TRAINING PROGRAM

## 2023-07-19 PROCEDURE — 81025 URINE PREGNANCY TEST: CPT | Performed by: EMERGENCY MEDICINE

## 2023-07-19 PROCEDURE — 36415 COLL VENOUS BLD VENIPUNCTURE: CPT | Performed by: EMERGENCY MEDICINE

## 2023-07-19 PROCEDURE — 96375 TX/PRO/DX INJ NEW DRUG ADDON: CPT | Performed by: EMERGENCY MEDICINE

## 2023-07-19 PROCEDURE — 99284 EMERGENCY DEPT VISIT MOD MDM: CPT | Performed by: EMERGENCY MEDICINE

## 2023-07-19 PROCEDURE — 80053 COMPREHEN METABOLIC PANEL: CPT | Performed by: EMERGENCY MEDICINE

## 2023-07-19 PROCEDURE — 96374 THER/PROPH/DIAG INJ IV PUSH: CPT | Mod: 59 | Performed by: EMERGENCY MEDICINE

## 2023-07-19 PROCEDURE — 96376 TX/PRO/DX INJ SAME DRUG ADON: CPT | Performed by: EMERGENCY MEDICINE

## 2023-07-19 PROCEDURE — 250N000009 HC RX 250: Performed by: EMERGENCY MEDICINE

## 2023-07-19 PROCEDURE — 74177 CT ABD & PELVIS W/CONTRAST: CPT

## 2023-07-19 PROCEDURE — 250N000011 HC RX IP 250 OP 636: Mod: JZ | Performed by: EMERGENCY MEDICINE

## 2023-07-19 PROCEDURE — 96361 HYDRATE IV INFUSION ADD-ON: CPT | Performed by: EMERGENCY MEDICINE

## 2023-07-19 PROCEDURE — 76856 US EXAM PELVIC COMPLETE: CPT | Mod: 26 | Performed by: STUDENT IN AN ORGANIZED HEALTH CARE EDUCATION/TRAINING PROGRAM

## 2023-07-19 PROCEDURE — 74177 CT ABD & PELVIS W/CONTRAST: CPT | Mod: 26 | Performed by: RADIOLOGY

## 2023-07-19 PROCEDURE — 85025 COMPLETE CBC W/AUTO DIFF WBC: CPT | Performed by: EMERGENCY MEDICINE

## 2023-07-19 PROCEDURE — 93976 VASCULAR STUDY: CPT

## 2023-07-19 PROCEDURE — 81001 URINALYSIS AUTO W/SCOPE: CPT | Performed by: EMERGENCY MEDICINE

## 2023-07-19 RX ORDER — ONDANSETRON 2 MG/ML
4 INJECTION INTRAMUSCULAR; INTRAVENOUS EVERY 30 MIN PRN
Status: DISCONTINUED | OUTPATIENT
Start: 2023-07-19 | End: 2023-07-19 | Stop reason: HOSPADM

## 2023-07-19 RX ORDER — HYDROMORPHONE HYDROCHLORIDE 1 MG/ML
0.5 INJECTION, SOLUTION INTRAMUSCULAR; INTRAVENOUS; SUBCUTANEOUS EVERY 30 MIN PRN
Status: DISCONTINUED | OUTPATIENT
Start: 2023-07-19 | End: 2023-07-19 | Stop reason: HOSPADM

## 2023-07-19 RX ORDER — IOPAMIDOL 755 MG/ML
85 INJECTION, SOLUTION INTRAVASCULAR ONCE
Status: COMPLETED | OUTPATIENT
Start: 2023-07-19 | End: 2023-07-19

## 2023-07-19 RX ORDER — KETOROLAC TROMETHAMINE 15 MG/ML
10 INJECTION, SOLUTION INTRAMUSCULAR; INTRAVENOUS ONCE
Status: COMPLETED | OUTPATIENT
Start: 2023-07-19 | End: 2023-07-19

## 2023-07-19 RX ADMIN — IOPAMIDOL 85 ML: 755 INJECTION, SOLUTION INTRAVENOUS at 11:58

## 2023-07-19 RX ADMIN — HYDROMORPHONE HYDROCHLORIDE 0.5 MG: 1 INJECTION, SOLUTION INTRAMUSCULAR; INTRAVENOUS; SUBCUTANEOUS at 07:37

## 2023-07-19 RX ADMIN — KETOROLAC TROMETHAMINE 10 MG: 15 INJECTION, SOLUTION INTRAMUSCULAR; INTRAVENOUS at 10:55

## 2023-07-19 RX ADMIN — SODIUM CHLORIDE 1000 ML: 9 INJECTION, SOLUTION INTRAVENOUS at 07:36

## 2023-07-19 RX ADMIN — ONDANSETRON 4 MG: 2 INJECTION INTRAMUSCULAR; INTRAVENOUS at 11:53

## 2023-07-19 RX ADMIN — SODIUM CHLORIDE 1000 ML: 9 INJECTION, SOLUTION INTRAVENOUS at 11:53

## 2023-07-19 RX ADMIN — SODIUM CHLORIDE, PRESERVATIVE FREE 75 ML: 5 INJECTION INTRAVENOUS at 11:58

## 2023-07-19 ASSESSMENT — ACTIVITIES OF DAILY LIVING (ADL)
ADLS_ACUITY_SCORE: 35
ADLS_ACUITY_SCORE: 33

## 2023-07-19 NOTE — DISCHARGE INSTRUCTIONS
Please make an appointment to follow up with your gynecologist in 1-2 days as needed.    Continue taking MiraLAX to help with your constipation.  You may also take over-the-counter senna-docusate.    Return to the ED for worsening pain, recurrent vomiting or diarrhea, blood in our stool, dehydration, fever, or abdominal distention and inability to pass gas from below.

## 2023-07-19 NOTE — ED TRIAGE NOTES
Patient presents to ER with severe pelvic pain that started yesterday. She admits to taking medications she got from the pain clinic and believes it is an ovarian cyst again.      Triage Assessment     Row Name 07/19/23 0651       Triage Assessment (Adult)    Airway WDL WDL       Respiratory WDL    Respiratory WDL WDL       Skin Circulation/Temperature WDL    Skin Circulation/Temperature WDL WDL       Cardiac WDL    Cardiac WDL WDL       Peripheral/Neurovascular WDL    Peripheral Neurovascular WDL WDL       Cognitive/Neuro/Behavioral WDL    Cognitive/Neuro/Behavioral WDL WDL

## 2023-07-19 NOTE — ED PROVIDER NOTES
Emergency Department Patient Sign-out       Brief HPI:  This is a 33 year old female signed out to me by Dr. Rodriguez .  See initial ED Provider note for details of the presentation.            Significant Events prior to my assuming care: Patient with acute on chronic pelvic pain and history of endometriosis and ovarian cysts.      Exam:   No data found.        ED RESULTS:   No results found for this visit on 07/19/23 (from the past 24 hour(s)).    ED MEDICATIONS:   Medications   0.9% sodium chloride BOLUS (0 mLs Intravenous Stopped 7/19/23 1053)   ketorolac (TORADOL) injection 10 mg (10 mg Intravenous $Given 7/19/23 1055)   0.9% sodium chloride BOLUS (0 mLs Intravenous Stopped 7/19/23 1320)   CT saline (75 mLs Intravenous $Given 7/19/23 1158)   iopamidol (ISOVUE-370) solution 85 mL (85 mLs Intravenous $Given 7/19/23 1158)         Impression:    ICD-10-CM    1. Pelvic pain in female  R10.2       2. Constipation, unspecified constipation type  K59.00           Plan:    Pending studies include transvaginal ultrasound and labs.      Labs notable for urinalysis without evidence of infection.  Negative urine pregnancy.  Normal CMP.  CBC.  Transvaginal ultrasound normal without evidence of ovarian cyst.  On reassessment, the patient states the pain improved after she received Dilaudid but she would like additional pain medication.  Given 10 mg Toradol.  Also given IV fluid.  She states that this pain is different than her normal endometriosis pain.  It is located more generally throughout her abdomen instead of just in the right lower quadrant.  She also has pain in her back.  She denies vaginal discharge but she does have vaginal pain.  She denies a rash or concern for an STD.  We will obtain a CT of the abdomen and pelvis with contrast.     CT showed moderate colonic stool burden but otherwise no acute abnormality. Discussed with patient. She felt like the concurrent diagnosis of constipation explained the unusual  intensity and localization of her chronic pain. She has managed constipation in the past and has a bowel regiment at home she will follow. Also recommended adding senna-docusate as needed. Patient felt comfortable discharging home. Recommend follow up with PCP/gyn for ongoing pain. Return precautions provided.           Medical Decision Making  The patient's presentation was of moderate complexity (a chronic illness mild to moderate exacerbation, progression, or side effect of treatment).    The patient's evaluation involved:  ordering and/or review of 3+ test(s) in this encounter (see separate area of note for details)  independent interpretation of testing performed by another health professional (see separate area of note for details)    The patient's management necessitated moderate risk (prescription drug management including medications given in the ED), high risk (a parenteral controlled substance) and high risk (a decision regarding hospitalization).        MD John Lawson Jill C, MD  07/22/23 7874

## 2023-07-19 NOTE — LETTER
July 19, 2023      To Whom It May Concern:      Do Hassan was seen in our Emergency Department today, 07/19/23.  I expect her condition to improve over the next 2 days.  She may return to work/school when improved.    Sincerely,        Cami Lopez MD

## 2023-07-19 NOTE — ED PROVIDER NOTES
ED Provider Note  [facility]      History     Chief Complaint   Patient presents with     Pelvic Pain     HPI  Do Hassan is a 33 year old female who presents to us with a chief complaint of pelvic pain.  She has a history of endometriosis and has similar pain like this when she is ovulating.  Today's pain seems worse than baseline however.  When it gets bad she normally takes oxycodone.  She took that last night twice without relief.  She still has severe pain.  When has been this bad in the past sometimes it is represents an ovarian cyst that are required operative intervention.  No fevers or chills.  She has had nausea and vomiting.  She denies possibility of pregnancy.    Past Medical History  Past Medical History:   Diagnosis Date     Anxiety      Arthritis     spine     Depression      Óscar-Danlos syndrome      Endometriosis      Endometriosis 02/2017    evasion of endometriosis     Heartburn      Intussusception (H) 1996     May-Thurner syndrome      Osteoporosis      POTS (postural orthostatic tachycardia syndrome)      Spina bifida occulta      Substance abuse (H)     Alcohol, Bnzodiazepine; sober since age 19     Past Surgical History:   Procedure Laterality Date     anterior posterior spinal fusion  2008    L5-S1     APPENDECTOMY       CYSTECTOMY OVARIAN BENIGN  2020    left     DAVINCI ASSISTED ABLATION / EXCISION OF ENDOMETRIOSIS  2017     LAPAROSCOPY DIAGNOSTIC (GYN) N/A 5/10/2022    Procedure: LAPAROSCOPY, Multiport, excision endometriosis, lysis of adhesions;  Surgeon: Terry Freeman MD;  Location: [facility] OR     ORTHOPEDIC SURGERY Right     right hip labial repair     aspirin (ASA) 325 MG tablet  buPROPion (WELLBUTRIN XL) 150 MG 24 hr tablet  buPROPion (WELLBUTRIN XL) 300 MG 24 hr tablet  FLUoxetine (PROZAC) 40 MG capsule  ROBERTO 0.35 MG tablet  medical cannabis (Patient's own supply)  Omeprazole Magnesium (PRILOSEC PO)  oxyCODONE (ROXICODONE) 5 MG  "tablet  SENNA-docusate sodium (SENNA S) 8.6-50 MG tablet  traZODone (DESYREL) 50 MG tablet      Allergies   Allergen Reactions     Contrast Dye Nausea     Iodinated Contrast Media Nausea and Vomiting     Iodine contrast causes vomiting per pt report     Nickel      Other reaction(s): Unknown     Family History  Family History   Problem Relation Age of Onset     Arthritis Mother      Anorexia nervosa Mother      Heart Defect Mother         Mitral Valve Prolapse; Leaky valve; possible heart failure related to anorexia     Constipation Father         Ashkenazi     Other - See Comments Father         Benign brain tumor     Prostate Cancer Father      Eating Disorder Sister      Hypothyroidism Maternal Grandmother      Osteoporosis Maternal Grandmother      Cancer Maternal Grandfather      Dementia Paternal Grandmother      Cerebrovascular Disease Paternal Grandfather      Alcoholism Paternal Grandfather      Social History   Social History     Tobacco Use     Smoking status: Former     Types: Cigarettes     Quit date: 2012     Years since quittin.2     Smokeless tobacco: Never     Tobacco comments:     MAY have a couple cigarettes a year   Substance Use Topics     Alcohol use: Not Currently     Comment: sober since age 19     Drug use: Not Currently         A medically appropriate review of systems was performed with pertinent positives and negatives noted in the HPI, and all other systems negative.    Physical Exam   BP: 112/72  Pulse: 116  Temp: 98.4  F (36.9  C)  Resp: 18  Height: 160 cm (5' 3\")  Weight: 68 kg (150 lb)  SpO2: 95 %  Physical Exam  Constitutional:       General: She is not in acute distress.     Appearance: She is ill-appearing. She is not diaphoretic.   HENT:      Head: Normocephalic and atraumatic.      Right Ear: External ear normal.      Left Ear: External ear normal.      Nose: Nose normal.   Eyes:      Extraocular Movements: Extraocular movements intact.      Conjunctiva/sclera: " Conjunctivae normal.   Cardiovascular:      Rate and Rhythm: Normal rate and regular rhythm.      Heart sounds: Normal heart sounds.   Pulmonary:      Effort: Pulmonary effort is normal. No respiratory distress.      Breath sounds: Normal breath sounds.   Abdominal:      General: There is no distension.      Palpations: Abdomen is soft.      Tenderness: There is abdominal tenderness.      Comments: Lower abdominal tenderness   Musculoskeletal:         General: No swelling or deformity.      Cervical back: Normal range of motion and neck supple.   Skin:     General: Skin is warm and dry.   Neurological:      Mental Status: Mental status is at baseline.      Comments: Alert, oriented   Psychiatric:         Mood and Affect: Mood normal.         Behavior: Behavior normal.           ED Course, Procedures, & Data      Procedures                      Results for orders placed or performed during the hospital encounter of 07/19/23   US Pelvis Cmplt w Transvag & Doppler LmtPel Duplex Limited     Status: None    Narrative    EXAMINATION: US PELVIS COMPLETE W TRANSVAGINAL AND DOPPLER LIMITED,  7/19/2023 8:20 AM     COMPARISON: Pelvic ultrasound 12/31/2022    HISTORY: Pelvic pain    TECHNIQUE: The pelvis was scanned in standard fashion with  transabdominal and transvaginal transducer(s) using both grey scale  and limited color Doppler techniques.    FINDINGS:  The uterus measures 8.0 x 3.9 x 5.8 cm, and there is no evidence of a  focal fibroid.  The endometrium is within normal limits and measures 4  mm. There is no free fluid in the pelvis.    The right ovary measures 2.2 x 1.4 x 3.3 cm. There is no adnexal mass.  There is preserved flow to the right ovary.    The left ovary is surgically absent.      Impression    IMPRESSION:   1.  No findings on ultrasound to explain the patient's symptoms.  2.  The left ovary is surgically absent. Otherwise unremarkable pelvic  ultrasound.    I have personally reviewed the examination and  initial interpretation  and I agree with the findings.    SONIA TUTTLE DO         SYSTEM ID:  [id number]   CT Abdomen Pelvis w Contrast     Status: None    Narrative    EXAMINATION: CT ABDOMEN PELVIS W CONTRAST, 7/19/2023 12:19 PM    INDICATION: pelvic/abd pain    COMPARISON STUDY: CT and pelvis 12/31/2022    TECHNIQUE: CT scan of the abdomen and pelvis was performed on  multidetector CT scanner using volumetric acquisition technique and  images were reconstructed in multiple planes with variable thickness  and reviewed on dedicated workstations.     CONTRAST: iopamidol (ISOVUE-370) solution 85 mL injected IV without  oral contrast    CT scan radiation dose is optimized to minimum requisite dose using  automated dose modulation techniques.    FINDINGS:    Support devices: None.    Lower thorax: Mild bibasilar atelectasis.    Liver: No focal mass lesions. No intrahepatic biliary ductal  dilatation.    Gallbladder and bile ducts: No stones, gallbladder wall thickening, or  pericholecystic fluid. No extrahepatic biliary ductal dilation.    Spleen: Unremarkable.    Pancreas: Normal CT appearance.     Adrenals: No nodules.     Kidneys and ureters: No solid lesions. No calculi. No  hydroureteronephrosis.    Bladder: Unremarkable.    Reproductive: Unremarkable. Left ovary is surgically absent.    Bowel: No dilated small or large bowel. Moderate colonic stool burden.    Appendix: Appendectomy.    Mesentery/Peritoneum: Periumbilical fat-containing hernia. No free  fluid. No free air.    Lymph nodes: Scattered small abdominal nodes, none meeting CT criteria  for pathologic enlargement.    Vasculature: Embolization coil in the left retroperitoneal near the  left gonadal veins, stable. Left iliac vein stent present.    Bone windows: No aggressive osseous abnormalities. Right convex  scoliotic curvature centered around L1-2. Lumbosacral spinal fusion  instrumentation.    Soft tissues: Unremarkable.      Impression    IMPRESSION:    1. Moderate colonic stool burden.  2. No acute abnormality of the abdomen or pelvis.    I have personally reviewed the examination and initial interpretation  and I agree with the findings.    EMILY MELGAR ARLETTE,          SYSTEM ID:  [id number]   Comprehensive metabolic panel     Status: Abnormal   Result Value Ref Range    Sodium 139 136 - 145 mmol/L    Potassium 3.8 3.4 - 5.3 mmol/L    Chloride 102 98 - 107 mmol/L    Carbon Dioxide (CO2) 27 22 - 29 mmol/L    Anion Gap 10 7 - 15 mmol/L    Urea Nitrogen 12.2 6.0 - 20.0 mg/dL    Creatinine 0.90 0.51 - 0.95 mg/dL    Calcium 9.4 8.6 - 10.0 mg/dL    Glucose 107 (H) 70 - 99 mg/dL    Alkaline Phosphatase 46 35 - 104 U/L    AST 17 0 - 45 U/L    ALT 15 0 - 50 U/L    Protein Total 7.2 6.4 - 8.3 g/dL    Albumin 4.5 3.5 - 5.2 g/dL    Bilirubin Total 0.3 <=1.2 mg/dL    GFR Estimate 86 >60 mL/min/1.73m2   UA with Microscopic reflex to Culture     Status: Abnormal    Specimen: Urine, Clean Catch   Result Value Ref Range    Color Urine Straw Colorless, Straw, Light Yellow, Yellow    Appearance Urine Clear Clear    Glucose Urine Negative Negative mg/dL    Bilirubin Urine Negative Negative    Ketones Urine Negative Negative mg/dL    Specific Gravity Urine 1.006 1.003 - 1.035    Blood Urine Negative Negative    pH Urine 6.0 5.0 - 7.0    Protein Albumin Urine Negative Negative mg/dL    Urobilinogen Urine Normal Normal, 2.0 mg/dL    Nitrite Urine Negative Negative    Leukocyte Esterase Urine Negative Negative    Bacteria Urine Few (A) None Seen /HPF    RBC Urine <1 <=2 /HPF    WBC Urine 1 <=5 /HPF    Squamous Epithelials Urine 1 <=1 /HPF    Narrative    Urine Culture not indicated   HCG qualitative urine (UPT)     Status: Normal   Result Value Ref Range    hCG Urine Qualitative Negative Negative   CBC with platelets and differential     Status: None   Result Value Ref Range    WBC Count 8.1 4.0 - 11.0 10e3/uL    RBC Count 4.30 3.80 - 5.20 10e6/uL    Hemoglobin 13.0 11.7 - 15.7 g/dL     Hematocrit 39.0 35.0 - 47.0 %    MCV 91 78 - 100 fL    MCH 30.2 26.5 - 33.0 pg    MCHC 33.3 31.5 - 36.5 g/dL    RDW 12.1 10.0 - 15.0 %    Platelet Count 316 150 - 450 10e3/uL    % Neutrophils 52 %    % Lymphocytes 36 %    % Monocytes 6 %    % Eosinophils 5 %    % Basophils 1 %    % Immature Granulocytes 0 %    NRBCs per 100 WBC 0 <1 /100    Absolute Neutrophils 4.2 1.6 - 8.3 10e3/uL    Absolute Lymphocytes 2.9 0.8 - 5.3 10e3/uL    Absolute Monocytes 0.5 0.0 - 1.3 10e3/uL    Absolute Eosinophils 0.4 0.0 - 0.7 10e3/uL    Absolute Basophils 0.1 0.0 - 0.2 10e3/uL    Absolute Immature Granulocytes 0.0 <=0.4 10e3/uL    Absolute NRBCs 0.0 10e3/uL   CBC with platelets differential     Status: None    Narrative    The following orders were created for panel order CBC with platelets differential.  Procedure                               Abnormality         Status                     ---------                               -----------         ------                     CBC with platelets and d...[[id number]]                      Final result                 Please view results for these tests on the individual orders.     Medications   0.9% sodium chloride BOLUS (0 mLs Intravenous Stopped 7/19/23 1053)   ketorolac (TORADOL) injection 10 mg (10 mg Intravenous $Given 7/19/23 1055)   0.9% sodium chloride BOLUS (0 mLs Intravenous Stopped 7/19/23 1320)   CT saline (75 mLs Intravenous $Given 7/19/23 1158)   iopamidol (ISOVUE-370) solution 85 mL (85 mLs Intravenous $Given 7/19/23 1158)     Labs Ordered and Resulted from Time of ED Arrival to Time of ED Departure   COMPREHENSIVE METABOLIC PANEL - Abnormal       Result Value    Sodium 139      Potassium 3.8      Chloride 102      Carbon Dioxide (CO2) 27      Anion Gap 10      Urea Nitrogen 12.2      Creatinine 0.90      Calcium 9.4      Glucose 107 (*)     Alkaline Phosphatase 46      AST 17      ALT 15      Protein Total 7.2      Albumin 4.5      Bilirubin Total 0.3      GFR Estimate  86     ROUTINE UA WITH MICROSCOPIC REFLEX TO CULTURE - Abnormal    Color Urine Straw      Appearance Urine Clear      Glucose Urine Negative      Bilirubin Urine Negative      Ketones Urine Negative      Specific Gravity Urine 1.006      Blood Urine Negative      pH Urine 6.0      Protein Albumin Urine Negative      Urobilinogen Urine Normal      Nitrite Urine Negative      Leukocyte Esterase Urine Negative      Bacteria Urine Few (*)     RBC Urine <1      WBC Urine 1      Squamous Epithelials Urine 1     HCG QUALITATIVE URINE - Normal    hCG Urine Qualitative Negative     CBC WITH PLATELETS AND DIFFERENTIAL    WBC Count 8.1      RBC Count 4.30      Hemoglobin 13.0      Hematocrit 39.0      MCV 91      MCH 30.2      MCHC 33.3      RDW 12.1      Platelet Count 316      % Neutrophils 52      % Lymphocytes 36      % Monocytes 6      % Eosinophils 5      % Basophils 1      % Immature Granulocytes 0      NRBCs per 100 WBC 0      Absolute Neutrophils 4.2      Absolute Lymphocytes 2.9      Absolute Monocytes 0.5      Absolute Eosinophils 0.4      Absolute Basophils 0.1      Absolute Immature Granulocytes 0.0      Absolute NRBCs 0.0       CT Abdomen Pelvis w Contrast   Final Result   IMPRESSION:    1. Moderate colonic stool burden.   2. No acute abnormality of the abdomen or pelvis.      I have personally reviewed the examination and initial interpretation   and I agree with the findings.      EMILY CHONG DO            SYSTEM ID:  [id number]      US Pelvis Cmplt w Transvag & Doppler LmtPel Duplex Limited   Final Result   IMPRESSION:    1.  No findings on ultrasound to explain the patient's symptoms.   2.  The left ovary is surgically absent. Otherwise unremarkable pelvic   ultrasound.      I have personally reviewed the examination and initial interpretation   and I agree with the findings.      SONIA TUTTLE DO            SYSTEM ID:  [id number]            Medical Decision Making  The patient's presentation is strongly  suggestive of high complexity (a chronic illness severe exacerbation, progression, or side effect of treatment).    The patient's evaluation involved:  review of external note(s) from 3+ sources (Most recent H&P in addition to clinic and ED note)  review of 2 test result(s) ordered prior to this encounter (Most recent BMP and CBC)      The patient's management involved high risk (a parenteral controlled substance).      Assessment & Plan    33-year-old female presents to us with a chief complaint of abdominal pain similar to previous endometriosis or ovarian cyst.  Pain medications labs as well as ultrasound of been ordered.  Patient care is signed out to the oncoming physician.    I have reviewed the nursing notes. I have reviewed the findings, diagnosis, plan and need for follow up with the patient.    Discharge Medication List as of 7/19/2023  1:20 PM          Final diagnoses:   Pelvic pain in female   Constipation, unspecified constipation type       Zak Rodriguez  [facility] EMERGENCY DEPARTMENT  7/19/2023     Zak Rodriguez DO  07/20/23 0130

## 2023-07-30 ENCOUNTER — E-VISIT (OUTPATIENT)
Dept: URGENT CARE | Facility: CLINIC | Age: 33
End: 2023-07-30
Payer: COMMERCIAL

## 2023-07-30 DIAGNOSIS — H10.33 ACUTE BACTERIAL CONJUNCTIVITIS OF BOTH EYES: Primary | ICD-10-CM

## 2023-07-30 PROCEDURE — 99207 PR NON-BILLABLE SERV PER CHARTING: CPT | Performed by: NURSE PRACTITIONER

## 2023-07-30 RX ORDER — POLYMYXIN B SULFATE AND TRIMETHOPRIM 1; 10000 MG/ML; [USP'U]/ML
SOLUTION OPHTHALMIC
Qty: 10 ML | Refills: 0 | Status: SHIPPED | OUTPATIENT
Start: 2023-07-30 | End: 2023-08-06

## 2023-07-30 NOTE — PATIENT INSTRUCTIONS
Thank you for choosing us for your care. I have placed an order for a prescription so that you can start treatment. View your full visit summary for details by clicking on the link below. Your pharmacist will able to address any questions you may have about the medication.     If you re not feeling better within 2-3 days, please schedule an appointment.  You can schedule an appointment right here in [facility], or call [phone]  If the visit is for the same symptoms as your eVisit, we ll refund the cost of your eVisit if seen within seven days.      Bacterial Conjunctivitis    You have an infection in the membranes covering the white part of the eye. This part of the eye is called the conjunctiva. The infection is called conjunctivitis. The most common symptoms of conjunctivitis include a thick, pus-like discharge from the eye, swollen eyelids, redness, eyelids sticking together upon awakening, and a gritty or scratchy feeling in the eye. Your infection was caused by bacteria. It may be treated with medicine. With treatment, the infection takes about 7 to 10 days to resolve.   Home care  Use prescribed antibiotic eye drops or ointment as directed to treat the infection.  Apply a warm compress (towel soaked in warm water) to the affected eye 3 to 4 times a day. Do this just before applying medicine to the eye.  Use a warm, wet cloth to wipe away crusting of the eyelids in the morning. This is caused by mucus drainage during the night. You may also use saline irrigating solution or artificial tears to rinse away mucus in the eye. Do not put a patch over the eye.  Wash your hands before and after touching the infected eye. This is to prevent spreading the infection to the other eye, and to other people. Don't share your towels or washcloths with others.  You may use acetaminophen or ibuprofen to control pain, unless another medicine was prescribed. Talk with your healthcare provider before using these medicines if you  have chronic liver or kidney disease. Also talk with your provider if you have ever had a stomach ulcer or digestive bleeding.  Don't wear contact lenses until your eyes have healed and all symptoms are gone.    Follow-up care  Follow up with your healthcare provider, or as advised.  When to seek medical advice  Call your healthcare provider right away if any of these occur:  Worsening vision  Increasing pain in the eye  Increasing swelling or redness of the eyelid  Redness spreading around the eye  EGT last reviewed this educational content on 4/1/2020 2000-2022 [employer]. All rights reserved. This information is not intended as a substitute for professional medical care. Always follow your healthcare professional's instructions.

## 2023-08-03 ENCOUNTER — OFFICE VISIT (OUTPATIENT)
Dept: URGENT CARE | Facility: URGENT CARE | Age: 33
End: 2023-08-03

## 2023-08-03 VITALS
HEART RATE: 80 BPM | SYSTOLIC BLOOD PRESSURE: 108 MMHG | HEIGHT: 63 IN | TEMPERATURE: 97.3 F | OXYGEN SATURATION: 98 % | BODY MASS INDEX: 26.58 KG/M2 | WEIGHT: 150 LBS | RESPIRATION RATE: 15 BRPM | DIASTOLIC BLOOD PRESSURE: 80 MMHG

## 2023-08-03 DIAGNOSIS — J30.89 NON-SEASONAL ALLERGIC RHINITIS, UNSPECIFIED TRIGGER: ICD-10-CM

## 2023-08-03 DIAGNOSIS — J01.90 ACUTE SINUSITIS WITH COEXISTING CONDITION, NEED PROPHYLACTIC TREATMENT: Primary | ICD-10-CM

## 2023-08-03 PROCEDURE — 99214 OFFICE O/P EST MOD 30 MIN: CPT | Performed by: PHYSICIAN ASSISTANT

## 2023-08-03 RX ORDER — CEFUROXIME AXETIL 500 MG/1
500 TABLET ORAL 2 TIMES DAILY
Qty: 20 TABLET | Refills: 0 | Status: SHIPPED | OUTPATIENT
Start: 2023-08-03 | End: 2023-08-13

## 2023-08-03 RX ORDER — ASPIRIN 81 MG/1
81 TABLET, CHEWABLE ORAL DAILY
COMMUNITY

## 2023-08-03 RX ORDER — FLUTICASONE PROPIONATE 50 MCG
1 SPRAY, SUSPENSION (ML) NASAL AT BEDTIME
Start: 2023-08-03

## 2023-08-03 RX ORDER — CETIRIZINE HYDROCHLORIDE 10 MG/1
10 TABLET ORAL DAILY
Start: 2023-08-03

## 2023-08-03 NOTE — PATIENT INSTRUCTIONS
(J01.90) Acute sinusitis with coexisting condition, need prophylactic treatment  (primary encounter diagnosis)  Comment: with resolving conjunctivitis  Plan: cefuroxime (CEFTIN) 500 MG tablet        Saline nasal spray as needed    (J30.89) Non-seasonal allergic rhinitis, unspecified trigger  Comment: also with component of allergic conjunctivitis  Plan: fluticasone (FLONASE) 50 MCG/ACT nasal spray,         cetirizine (ZYRTEC) 10 MG tablet, ketotifen         (ZADITOR) 0.025 % ophthalmic solution    Follow up with ophthalmology should symptoms persist or worsen.      Stay on cetirizine and flonase for MINIMUM of 2-3 weeks, consider using through September.

## 2023-08-03 NOTE — PROGRESS NOTES
Patient presents with:  Urgent Care  Eye Problem: Seen for pinkeye and med not helping at all. Greens discharge.   URI: URI started 2 days ago, coughing up big chunks green/yellow. Muscles in neck hurt    (J01.90) Acute sinusitis with coexisting condition, need prophylactic treatment  (primary encounter diagnosis)  Comment: with resolving conjunctivitis  Plan: cefuroxime (CEFTIN) 500 MG tablet        Saline nasal spray as needed    (J30.89) Non-seasonal allergic rhinitis, unspecified trigger  Comment: also with component of allergic conjunctivitis  Plan: fluticasone (FLONASE) 50 MCG/ACT nasal spray,         cetirizine (ZYRTEC) 10 MG tablet, ketotifen         (ZADITOR) 0.025 % ophthalmic solution    Follow up with ophthalmology should symptoms persist or worsen.      Stay on cetirizine and flonase for MINIMUM of 2-3 weeks, consider using through September.            See orders in Epic      If not improving or if condition worsens, follow up with your Primary Care Provider            SUBJECTIVE:   Do Hassan is a pleasant 33 year old female who presents today with one week history of red eyes with purulent drainage.  No relief with the antibiotic drops.  Son had pinkeye recently, his symptoms have cleared with drops (same antibiotic).    Eye itching is present.  Some photophobia.     Runny nose and cough and sore throat for the past 2 days.    Does have underlying allergies, not currently taking anything for them.     Low grade fevers recently.    In between jobs at moment, and therefor insurance.  Will be changing medical systems due to change in insurance.          Past Medical History:   Diagnosis Date    Anxiety     Arthritis     spine    Depression     Óscar-Danlos syndrome     Endometriosis     Endometriosis 02/2017    evasion of endometriosis    Heartburn     Intussusception (H) 1996    May-Thurner syndrome     Osteoporosis     POTS (postural orthostatic tachycardia syndrome)     Spina bifida occulta      "Substance abuse (H)     Alcohol, Bnzodiazepine; sober since age 19         Current Outpatient Medications   Medication Sig Dispense Refill    Multiple Vitamins-Iron (DAILY-BEBE/IRON/BETA-CAROTENE) TABS TAKE 1 TABLET BY MOUTH DAILY. (Patient not taking: Reported on 10/19/2020) 30 tablet 7     Social History     Tobacco Use    Smoking status: Never Smoker    Smokeless tobacco: Never Used   Substance Use Topics    Alcohol use: Not on file     Family History   Problem Relation Age of Onset    Diabetes Mother     Diabetes Father          ROS:    10 point ROS of systems including Constitutional, Eyes, Respiratory, Cardiovascular, Gastroenterology, Genitourinary, Integumentary, Muscularskeletal, Psychiatric ,neurological were all negative except for pertinent positives noted in my HPI       OBJECTIVE:  /80   Pulse 80   Temp 97.3  F (36.3  C) (Temporal)   Resp 15   Ht 1.6 m (5' 3\")   Wt 68 kg (150 lb)   LMP 05/26/2023 (Approximate)   SpO2 98%   BMI 26.57 kg/m    Physical Exam:  GENERAL APPEARANCE: healthy, alert and no distress  EYES: EOMI,  PERRL, conjunctivae injected bilaterally with conjunctival cobblestoning.   HENT: ear canals and TM's normal.  Nose with boggy turbinates and yellow coryza and mouth without ulcers, erythema or lesions.  Post nasal drip is present.  NECK: supple, nontender, no lymphadenopathy  RESP: lungs clear to auscultation - no rales, rhonchi or wheezes  CV: regular rates and rhythm, normal S1 S2, no murmur noted  NEURO:normal speech and mentation  SKIN: no suspicious lesions or rashes  "

## 2023-11-06 ENCOUNTER — HOSPITAL ENCOUNTER (EMERGENCY)
Facility: CLINIC | Age: 33
Discharge: HOME | End: 2023-11-07
Attending: EMERGENCY MEDICINE | Admitting: EMERGENCY MEDICINE
Payer: COMMERCIAL

## 2023-11-06 ENCOUNTER — APPOINTMENT (OUTPATIENT)
Dept: CT IMAGING | Facility: CLINIC | Age: 33
End: 2023-11-06
Attending: EMERGENCY MEDICINE
Payer: COMMERCIAL

## 2023-11-06 ENCOUNTER — APPOINTMENT (OUTPATIENT)
Dept: ULTRASOUND IMAGING | Facility: CLINIC | Age: 33
End: 2023-11-06
Attending: EMERGENCY MEDICINE
Payer: COMMERCIAL

## 2023-11-06 DIAGNOSIS — R10.2 PELVIC PAIN: ICD-10-CM

## 2023-11-06 DIAGNOSIS — N83.201 RIGHT OVARIAN CYST: Primary | ICD-10-CM

## 2023-11-06 DIAGNOSIS — R10.9 RIGHT FLANK PAIN: ICD-10-CM

## 2023-11-06 LAB
ALBUMIN SERPL BCG-MCNC: 4.8 G/DL (ref 3.5–5.2)
ALP SERPL-CCNC: 49 U/L (ref 35–104)
ALT SERPL W P-5'-P-CCNC: 14 U/L (ref 0–50)
ANION GAP SERPL CALCULATED.3IONS-SCNC: 13 MMOL/L (ref 7–15)
AST SERPL W P-5'-P-CCNC: 16 U/L (ref 0–45)
BASOPHILS # BLD AUTO: 0.1 10E3/UL (ref 0–0.2)
BASOPHILS NFR BLD AUTO: 1 %
BILIRUB SERPL-MCNC: 0.4 MG/DL
BUN SERPL-MCNC: 8.7 MG/DL (ref 6–20)
CALCIUM SERPL-MCNC: 9.6 MG/DL (ref 8.6–10)
CHLORIDE SERPL-SCNC: 99 MMOL/L (ref 98–107)
CREAT SERPL-MCNC: 0.92 MG/DL (ref 0.51–0.95)
DEPRECATED HCO3 PLAS-SCNC: 24 MMOL/L (ref 22–29)
EGFRCR SERPLBLD CKD-EPI 2021: 84 ML/MIN/1.73M2
EOSINOPHIL # BLD AUTO: 0.3 10E3/UL (ref 0–0.7)
EOSINOPHIL NFR BLD AUTO: 3 %
ERYTHROCYTE [DISTWIDTH] IN BLOOD BY AUTOMATED COUNT: 12 % (ref 10–15)
GLUCOSE SERPL-MCNC: 115 MG/DL (ref 70–99)
HCG SERPL QL: NEGATIVE
HCT VFR BLD AUTO: 38.1 % (ref 35–47)
HGB BLD-MCNC: 13 G/DL (ref 11.7–15.7)
HOLD SPECIMEN: NORMAL
HOLD SPECIMEN: NORMAL
IMM GRANULOCYTES # BLD: 0 10E3/UL
IMM GRANULOCYTES NFR BLD: 0 %
LIPASE SERPL-CCNC: 27 U/L (ref 13–60)
LYMPHOCYTES # BLD AUTO: 1.9 10E3/UL (ref 0.8–5.3)
LYMPHOCYTES NFR BLD AUTO: 18 %
MCH RBC QN AUTO: 30.1 PG (ref 26.5–33)
MCHC RBC AUTO-ENTMCNC: 34.1 G/DL (ref 31.5–36.5)
MCV RBC AUTO: 88 FL (ref 78–100)
MONOCYTES # BLD AUTO: 0.5 10E3/UL (ref 0–1.3)
MONOCYTES NFR BLD AUTO: 5 %
NEUTROPHILS # BLD AUTO: 7.8 10E3/UL (ref 1.6–8.3)
NEUTROPHILS NFR BLD AUTO: 73 %
NRBC # BLD AUTO: 0 10E3/UL
NRBC BLD AUTO-RTO: 0 /100
PLATELET # BLD AUTO: 302 10E3/UL (ref 150–450)
POTASSIUM SERPL-SCNC: 4.1 MMOL/L (ref 3.4–5.3)
PROT SERPL-MCNC: 7.8 G/DL (ref 6.4–8.3)
RBC # BLD AUTO: 4.32 10E6/UL (ref 3.8–5.2)
SODIUM SERPL-SCNC: 136 MMOL/L (ref 135–145)
WBC # BLD AUTO: 10.5 10E3/UL (ref 4–11)

## 2023-11-06 PROCEDURE — 96374 THER/PROPH/DIAG INJ IV PUSH: CPT | Mod: 59

## 2023-11-06 PROCEDURE — 83690 ASSAY OF LIPASE: CPT | Performed by: EMERGENCY MEDICINE

## 2023-11-06 PROCEDURE — 76856 US EXAM PELVIC COMPLETE: CPT

## 2023-11-06 PROCEDURE — 85025 COMPLETE CBC W/AUTO DIFF WBC: CPT | Performed by: EMERGENCY MEDICINE

## 2023-11-06 PROCEDURE — 96375 TX/PRO/DX INJ NEW DRUG ADDON: CPT

## 2023-11-06 PROCEDURE — 250N000011 HC RX IP 250 OP 636: Mod: JZ | Performed by: EMERGENCY MEDICINE

## 2023-11-06 PROCEDURE — 99285 EMERGENCY DEPT VISIT HI MDM: CPT | Mod: 25

## 2023-11-06 PROCEDURE — 84703 CHORIONIC GONADOTROPIN ASSAY: CPT | Performed by: EMERGENCY MEDICINE

## 2023-11-06 PROCEDURE — 80053 COMPREHEN METABOLIC PANEL: CPT | Performed by: EMERGENCY MEDICINE

## 2023-11-06 PROCEDURE — 96376 TX/PRO/DX INJ SAME DRUG ADON: CPT

## 2023-11-06 PROCEDURE — 36415 COLL VENOUS BLD VENIPUNCTURE: CPT | Performed by: EMERGENCY MEDICINE

## 2023-11-06 PROCEDURE — 250N000013 HC RX MED GY IP 250 OP 250 PS 637: Performed by: EMERGENCY MEDICINE

## 2023-11-06 PROCEDURE — 74177 CT ABD & PELVIS W/CONTRAST: CPT

## 2023-11-06 RX ORDER — ONDANSETRON 2 MG/ML
4 INJECTION INTRAMUSCULAR; INTRAVENOUS ONCE
Status: COMPLETED | OUTPATIENT
Start: 2023-11-06 | End: 2023-11-06

## 2023-11-06 RX ORDER — NAPROXEN SODIUM 550 MG/1
550 TABLET ORAL 2 TIMES DAILY WITH MEALS
Qty: 14 TABLET | Refills: 0 | Status: SHIPPED | OUTPATIENT
Start: 2023-11-06 | End: 2023-11-13

## 2023-11-06 RX ORDER — HYDROXYZINE HYDROCHLORIDE 25 MG/1
25-50 TABLET, FILM COATED ORAL EVERY 6 HOURS PRN
Qty: 20 TABLET | Refills: 0 | Status: SHIPPED | OUTPATIENT
Start: 2023-11-06

## 2023-11-06 RX ORDER — OXYCODONE HYDROCHLORIDE 5 MG/1
5-10 TABLET ORAL EVERY 6 HOURS PRN
Qty: 12 TABLET | Refills: 0 | Status: SHIPPED | OUTPATIENT
Start: 2023-11-06 | End: 2023-11-09

## 2023-11-06 RX ORDER — PROCHLORPERAZINE MALEATE 10 MG
10 TABLET ORAL EVERY 6 HOURS PRN
Qty: 12 TABLET | Refills: 0 | Status: SHIPPED | OUTPATIENT
Start: 2023-11-06

## 2023-11-06 RX ORDER — KETOROLAC TROMETHAMINE 15 MG/ML
15 INJECTION, SOLUTION INTRAMUSCULAR; INTRAVENOUS ONCE
Status: COMPLETED | OUTPATIENT
Start: 2023-11-06 | End: 2023-11-06

## 2023-11-06 RX ORDER — DIPHENHYDRAMINE HYDROCHLORIDE 50 MG/ML
25 INJECTION INTRAMUSCULAR; INTRAVENOUS
Status: COMPLETED | OUTPATIENT
Start: 2023-11-06 | End: 2023-11-06

## 2023-11-06 RX ORDER — ONDANSETRON 4 MG/1
4 TABLET, ORALLY DISINTEGRATING ORAL EVERY 8 HOURS PRN
Qty: 10 TABLET | Refills: 0 | Status: SHIPPED | OUTPATIENT
Start: 2023-11-06 | End: 2023-11-09

## 2023-11-06 RX ORDER — OXYCODONE HYDROCHLORIDE 5 MG/1
10 TABLET ORAL ONCE
Status: COMPLETED | OUTPATIENT
Start: 2023-11-06 | End: 2023-11-06

## 2023-11-06 RX ADMIN — ONDANSETRON 4 MG: 2 INJECTION INTRAMUSCULAR; INTRAVENOUS at 21:52

## 2023-11-06 RX ADMIN — ONDANSETRON 4 MG: 2 INJECTION INTRAMUSCULAR; INTRAVENOUS at 17:28

## 2023-11-06 RX ADMIN — KETOROLAC TROMETHAMINE 15 MG: 15 INJECTION, SOLUTION INTRAMUSCULAR; INTRAVENOUS at 17:35

## 2023-11-06 RX ADMIN — DIPHENHYDRAMINE HYDROCHLORIDE 25 MG: 50 INJECTION, SOLUTION INTRAMUSCULAR; INTRAVENOUS at 23:58

## 2023-11-06 RX ADMIN — OXYCODONE HYDROCHLORIDE 10 MG: 5 TABLET ORAL at 21:51

## 2023-11-06 ASSESSMENT — ACTIVITIES OF DAILY LIVING (ADL)
ADLS_ACUITY_SCORE: 35

## 2023-11-06 NOTE — ED NOTES
Rapid Assessment Note    History:   Do Hassan is a 33 year old female who presents with worsening pain she attributes to her known right-sided ovarian cyst.  Most recent measurements she reports for 5 to 6 cm.  Has a appointment with her OB/GYN as scheduled but not for another week and a half.  Sees OB/GYN specialist, Dr. Freeman (spelling was not confirmed with her).  For the past week has not been having pain severe enough that she has had associated vomiting.  It started more in the right pelvis but she is now feeling it into the upper part of the right leg and into the right upper abdomen.  Has oxycodone that she was prescribed from the emergency department.  Pain today intermittently was 10 out of 10.  Does not think she is pregnant as she has not had sex since her pain started.    On chart review had visits October 20 and 28 in an outside emergency department for right lower quadrant abdominal pain and ovarian cyst.    Exam:   General:  Alert, interactive  Cardiovascular:  Well perfused  Lungs:  No respiratory distress, no accessory muscle use  Neuro:  Moving all 4 extremities  Skin:  Warm, dry  Psych:  Normal affect  GI: No peritoneal findings on exam, marked R sided tenderness with light touch so deeper was not done, no tenderness on left    Plan of Care:   I evaluated the patient and developed an initial plan of care. I discussed this plan and explained that I, or one of my partners, would be returning to complete the evaluation.        Adelita Darling MD  11/06/23 8374      9:45 PM Reassessed and updated.  Still ongoing significant pain worsening again since taking her own oxycodone earlier.     Adelita Darling MD  11/06/23 6750

## 2023-11-06 NOTE — ED TRIAGE NOTES
Pt presents with known R side ovarian cyst. Pt having intermittent 10/10 pain. Also experiencing nausea, dry heaves.      Triage Assessment (Adult)       Row Name 11/06/23 5867          Triage Assessment    Airway WDL WDL        Respiratory WDL    Respiratory WDL WDL        Skin Circulation/Temperature WDL    Skin Circulation/Temperature WDL WDL        Cardiac WDL    Cardiac WDL WDL        Peripheral/Neurovascular WDL    Peripheral Neurovascular WDL WDL        Cognitive/Neuro/Behavioral WDL    Cognitive/Neuro/Behavioral WDL WDL

## 2023-11-07 VITALS
SYSTOLIC BLOOD PRESSURE: 101 MMHG | RESPIRATION RATE: 18 BRPM | TEMPERATURE: 98.3 F | WEIGHT: 150 LBS | OXYGEN SATURATION: 99 % | DIASTOLIC BLOOD PRESSURE: 61 MMHG | HEART RATE: 96 BPM | BODY MASS INDEX: 26.57 KG/M2

## 2023-11-07 PROCEDURE — 250N000009 HC RX 250: Performed by: EMERGENCY MEDICINE

## 2023-11-07 PROCEDURE — 250N000011 HC RX IP 250 OP 636: Mod: JZ | Performed by: EMERGENCY MEDICINE

## 2023-11-07 PROCEDURE — 250N000013 HC RX MED GY IP 250 OP 250 PS 637: Performed by: EMERGENCY MEDICINE

## 2023-11-07 RX ORDER — IBUPROFEN 400 MG/1
400 TABLET, FILM COATED ORAL ONCE
Status: COMPLETED | OUTPATIENT
Start: 2023-11-07 | End: 2023-11-07

## 2023-11-07 RX ORDER — IOPAMIDOL 755 MG/ML
75 INJECTION, SOLUTION INTRAVASCULAR ONCE
Status: COMPLETED | OUTPATIENT
Start: 2023-11-07 | End: 2023-11-07

## 2023-11-07 RX ORDER — OXYCODONE HYDROCHLORIDE 5 MG/1
5 TABLET ORAL ONCE
Status: COMPLETED | OUTPATIENT
Start: 2023-11-07 | End: 2023-11-07

## 2023-11-07 RX ORDER — ACETAMINOPHEN 500 MG
1000 TABLET ORAL ONCE
Status: COMPLETED | OUTPATIENT
Start: 2023-11-07 | End: 2023-11-07

## 2023-11-07 RX ADMIN — OXYCODONE HYDROCHLORIDE 5 MG: 5 TABLET ORAL at 02:39

## 2023-11-07 RX ADMIN — ACETAMINOPHEN 1000 MG: 500 TABLET, FILM COATED ORAL at 02:39

## 2023-11-07 RX ADMIN — IBUPROFEN 400 MG: 400 TABLET ORAL at 02:39

## 2023-11-07 RX ADMIN — SODIUM CHLORIDE 62 ML: 9 INJECTION, SOLUTION INTRAVENOUS at 02:09

## 2023-11-07 RX ADMIN — IOPAMIDOL 75 ML: 755 INJECTION, SOLUTION INTRAVENOUS at 02:09

## 2023-11-07 ASSESSMENT — ACTIVITIES OF DAILY LIVING (ADL): ADLS_ACUITY_SCORE: 35

## 2023-11-07 NOTE — ED PROVIDER NOTES
History     Chief Complaint:  Flank Pain       HPI   Do Hassan is a 33 year old female who presents with worsening pain she attributes to her known right-sided ovarian cyst.  Most recent measurements she reports for 5 to 6 cm.  Has a appointment with her OB/GYN as scheduled but not for another week and a half.  Sees OB/GYN specialist, Dr. Freeman (spelling was not confirmed with her).  For the past week has not been having pain severe enough that she has had associated vomiting.  It started more in the right pelvis but she is now feeling it into the upper part of the right leg and into the right upper abdomen.  Has oxycodone that she was prescribed from the emergency department.  Pain today intermittently was 10 out of 10.  Does not think she is pregnant as she has not had sex since her pain started.     On chart review had visits October 20 and 28 in an outside emergency department for right lower quadrant abdominal pain and ovarian cyst.    Sometimes uses marijuana to help with the nausea and pain.  Compazine has helped before but makes her tired.  Zofran does not always help.  Had bad reaction to Haldol in the past where she was anxious, restless, and wanted to leave immediately.      Independent Historian:    Patient    Review of External Notes:  US 10/28: RIGHT OVARY: 5.9 x 3.9 x 4.2 cm. There is a simple ovarian cyst measuring 5.0 x 3.2 x 3.6 cm. Otherwise normal ovary with arterial and venous duplex flow identified.     PDMP reviewed, gap in scripts from June - Sept, had two in October      Medications:    aspirin (ASA) 81 MG chewable tablet  buPROPion (WELLBUTRIN XL) 150 MG 24 hr tablet  buPROPion (WELLBUTRIN XL) 300 MG 24 hr tablet  cetirizine (ZYRTEC) 10 MG tablet  FLUoxetine (PROZAC) 40 MG capsule  fluticasone (FLONASE) 50 MCG/ACT nasal spray  ROBERTO 0.35 MG tablet  ketotifen (ZADITOR) 0.025 % ophthalmic solution  medical cannabis (Patient's own supply)  Omeprazole Magnesium (PRILOSEC  PO)  oxyCODONE (ROXICODONE) 5 MG tablet  SENNA-docusate sodium (SENNA S) 8.6-50 MG tablet  traZODone (DESYREL) 50 MG tablet        Past Medical History:    Past Medical History:   Diagnosis Date    Anxiety     Arthritis     Depression     Óscar-Danlos syndrome     Endometriosis     Endometriosis 02/2017    Heartburn     Intussusception (H) 1996    May-Thurner syndrome     Osteoporosis     POTS (postural orthostatic tachycardia syndrome)     Spina bifida occulta     Substance abuse (H)        Past Surgical History:    Past Surgical History:   Procedure Laterality Date    anterior posterior spinal fusion  2008    L5-S1    APPENDECTOMY      CYSTECTOMY OVARIAN BENIGN  2020    left    DAVINCI ASSISTED ABLATION / EXCISION OF ENDOMETRIOSIS  2017    LAPAROSCOPY DIAGNOSTIC (GYN) N/A 5/10/2022    Procedure: LAPAROSCOPY, Multiport, excision endometriosis, lysis of adhesions;  Surgeon: Terry Freeman MD;  Location: [facility] OR    ORTHOPEDIC SURGERY Right     right hip labial repair          Physical Exam   Patient Vitals for the past 24 hrs:   BP Temp Temp src Pulse Resp SpO2 Weight   11/06/23 2143 126/80 -- -- 100 18 97 % --   11/06/23 1718 -- -- -- -- -- 96 % --   11/06/23 1717 (!) 166/70 98.4  F (36.9  C) Temporal 104 18 -- 68 kg (150 lb)        Physical Exam    General:  Alert, interactive  Cardiovascular:  Well perfused  Lungs:  No respiratory distress, no accessory muscle use  Neuro:  Moving all 4 extremities  Skin:  Warm, dry  Psych:  Normal affect  GI: No peritoneal findings on exam, marked R sided tenderness with light touch so deeper was not done, no tenderness on left      Emergency Department Course       Imaging:  US Pelvis Cmplt w Transvag & Doppler LmtPel Duplex Limited   Final Result   IMPRESSION:     1.  A 3.1 cm right ovarian cyst with mild complexity. This most likely represents a collapsing benign cyst. Recommend a follow-up pelvic ultrasound in 6-12 weeks to document resolution.                 Report  per radiology    Laboratory:  Labs Ordered and Resulted from Time of ED Arrival to Time of ED Departure   COMPREHENSIVE METABOLIC PANEL - Abnormal       Result Value    Sodium 136      Potassium 4.1      Carbon Dioxide (CO2) 24      Anion Gap 13      Urea Nitrogen 8.7      Creatinine 0.92      GFR Estimate 84      Calcium 9.6      Chloride 99      Glucose 115 (*)     Alkaline Phosphatase 49      AST 16      ALT 14      Protein Total 7.8      Albumin 4.8      Bilirubin Total 0.4     HCG QUALITATIVE PREGNANCY - Normal    hCG Serum Qualitative Negative     LIPASE - Normal    Lipase 27     CBC WITH PLATELETS AND DIFFERENTIAL    WBC Count 10.5      RBC Count 4.32      Hemoglobin 13.0      Hematocrit 38.1      MCV 88      MCH 30.1      MCHC 34.1      RDW 12.0      Platelet Count 302      % Neutrophils 73      % Lymphocytes 18      % Monocytes 5      % Eosinophils 3      % Basophils 1      % Immature Granulocytes 0      NRBCs per 100 WBC 0      Absolute Neutrophils 7.8      Absolute Lymphocytes 1.9      Absolute Monocytes 0.5      Absolute Eosinophils 0.3      Absolute Basophils 0.1      Absolute Immature Granulocytes 0.0      Absolute NRBCs 0.0          Procedures   NA    Emergency Department Course & Assessments:             Interventions:  Medications   ondansetron (ZOFRAN) injection 4 mg (4 mg Intravenous $Given 11/6/23 1728)   ketorolac (TORADOL) injection 15 mg (15 mg Intravenous $Given 11/6/23 1735)   oxyCODONE (ROXICODONE) tablet 10 mg (10 mg Oral $Given 11/6/23 2151)   ondansetron (ZOFRAN) injection 4 mg (4 mg Intravenous $Given 11/6/23 2152)        Assessments:  9:45 PM Reassessed and updated.  Still ongoing significant pain worsening again since taking her own oxycodone earlier.  10:05 PM Updated with plan after speaking with OB/Gyn.  Soon after was sobbing and crying uncontrollably with nurse.  10:50 PM Reassessed after nurse informed me she didn't feel comfortable leaving.  At this time she is calmly sitting  in triage with her phone.  Feels like the oxycodone is helping take the edge off.    Independent interpretation (X-rays, CTs, rhythm strip):  No    Consultations/Discussion of Management or Tests:  ED Course as of 11/06/23 2205   Mon Nov 06, 2023 2159 I spoke with Dr. Tong, OB/Gyn       Social Determinants of Health affecting care:  None     Disposition:  Signed out    Impression & Plan      Medical Decision Making:  The severity of her symptoms and the size of her cyst recently are concerning for torsion or hemorrhagic rupture.  Potential for alternate etiology of symptoms such as kidney stone and appendicitis were considered as well.  Ultrasound was obtained with the cyst now smaller than it was recently.  Vascular flow was noted.  This does not rule out torsion but is a reassuring finding.  Hemoglobin is normal making significant hemorrhage of the cyst unlikely.  With her associated vomiting, there is no evidence for hepatitis, pancreatitis, severe electrolyte derangements or acute renal failure.      I spoke with OB/GYN who wanted to make sure that she was on a form of progesterone.  If not then starting 1 may help her symptoms.  Dr. Goss has spoken with this patient on the phone in the past.  No indication for emergent surgery.  Between her history of recurrent cysts and endometriosis, does have a chronic component to her pain.  She will contact the nurses about getting her follow-up this week.  It will not be with her doctor as they are out of town this week.  Okay to prescribe pain medications.  Patient states she is already on the minipill.  Takes aspirin and due to an iliac stent and has been hesitant to use NSAIDs.  Recommended Aleve due to its longer acting nature.  Discharge medications were prescribed.      However after this discussion she started sobbing uncontrollably and told the nurse that she could not leave.  I went back to reassess her when a code at which time she was calmly  sitting in the chair.  She is concerned about ongoing pain control.  Would like to continue to wait for room for reassessment and potentially some additional medications to control symptoms prior to considering discharge.  Now open to considering repeat CT to evaluate symptoms.    Signed out.    Diagnosis:    ICD-10-CM    1. Right ovarian cyst  N83.201       2. Pelvic pain  R10.2       3. Right flank pain  R10.9            Discharge Medications:  New Prescriptions    HYDROXYZINE (ATARAX) 25 MG TABLET    Take 1-2 tablets (25-50 mg) by mouth every 6 hours as needed (with pain medications, can cause drowsiness)    NAPROXEN SODIUM (ANAPROX) 550 MG TABLET    Take 1 tablet (550 mg) by mouth 2 times daily (with meals) for 7 days    ONDANSETRON (ZOFRAN ODT) 4 MG ODT TAB    Take 1 tablet (4 mg) by mouth every 8 hours as needed for nausea or vomiting    OXYCODONE (ROXICODONE) 5 MG TABLET    Take 1-2 tablets (5-10 mg) by mouth every 6 hours as needed for pain    PROCHLORPERAZINE (COMPAZINE) 10 MG TABLET    Take 1 tablet (10 mg) by mouth every 6 hours as needed for nausea or vomiting         Adelita Darling MD  11/06/23 0393

## 2023-11-07 NOTE — ED PROVIDER NOTES
Patient received in signout.  CT scan was obtained and did not show any signs of acute process.  I did discuss this with the patient.  I did evaluate the patient on a couple occasions, and she was resting comfortably.  She did request a repeat dose of pain medication prior to discharge.  I did recommend close follow-up with her OB/GYN for continued evaluation and management of her symptoms.     Alexi Weaver MD  11/07/23 0236

## 2024-04-15 PROCEDURE — 88307 TISSUE EXAM BY PATHOLOGIST: CPT | Mod: TC,ORL | Performed by: OBSTETRICS & GYNECOLOGY

## 2024-04-15 PROCEDURE — 88307 TISSUE EXAM BY PATHOLOGIST: CPT | Mod: 26 | Performed by: STUDENT IN AN ORGANIZED HEALTH CARE EDUCATION/TRAINING PROGRAM

## 2024-04-16 ENCOUNTER — APPOINTMENT (OUTPATIENT)
Dept: CT IMAGING | Facility: CLINIC | Age: 34
End: 2024-04-16
Attending: EMERGENCY MEDICINE
Payer: COMMERCIAL

## 2024-04-16 ENCOUNTER — HOSPITAL ENCOUNTER (EMERGENCY)
Facility: CLINIC | Age: 34
Discharge: HOME | End: 2024-04-16
Attending: EMERGENCY MEDICINE | Admitting: EMERGENCY MEDICINE
Payer: COMMERCIAL

## 2024-04-16 ENCOUNTER — LAB REQUISITION (OUTPATIENT)
Dept: LAB | Facility: CLINIC | Age: 34
End: 2024-04-16
Payer: COMMERCIAL

## 2024-04-16 VITALS
WEIGHT: 150 LBS | TEMPERATURE: 98.2 F | HEART RATE: 75 BPM | SYSTOLIC BLOOD PRESSURE: 111 MMHG | OXYGEN SATURATION: 97 % | BODY MASS INDEX: 26.58 KG/M2 | DIASTOLIC BLOOD PRESSURE: 72 MMHG | RESPIRATION RATE: 14 BRPM | HEIGHT: 63 IN

## 2024-04-16 DIAGNOSIS — G89.18 ACUTE POSTOPERATIVE ABDOMINAL PAIN: ICD-10-CM

## 2024-04-16 DIAGNOSIS — R10.9 ACUTE POSTOPERATIVE ABDOMINAL PAIN: ICD-10-CM

## 2024-04-16 LAB
ALBUMIN UR-MCNC: NEGATIVE MG/DL
ANION GAP SERPL CALCULATED.3IONS-SCNC: 12 MMOL/L (ref 7–15)
APPEARANCE UR: CLEAR
BASOPHILS # BLD AUTO: 0 10E3/UL (ref 0–0.2)
BASOPHILS NFR BLD AUTO: 0 %
BILIRUB UR QL STRIP: NEGATIVE
BUN SERPL-MCNC: 4.2 MG/DL (ref 6–20)
CALCIUM SERPL-MCNC: 8.9 MG/DL (ref 8.6–10)
CHLORIDE SERPL-SCNC: 103 MMOL/L (ref 98–107)
COLOR UR AUTO: ABNORMAL
CREAT SERPL-MCNC: 0.83 MG/DL (ref 0.51–0.95)
DEPRECATED HCO3 PLAS-SCNC: 22 MMOL/L (ref 22–29)
EGFRCR SERPLBLD CKD-EPI 2021: >90 ML/MIN/1.73M2
EOSINOPHIL # BLD AUTO: 0 10E3/UL (ref 0–0.7)
EOSINOPHIL NFR BLD AUTO: 0 %
ERYTHROCYTE [DISTWIDTH] IN BLOOD BY AUTOMATED COUNT: 11.9 % (ref 10–15)
GLUCOSE SERPL-MCNC: 114 MG/DL (ref 70–99)
GLUCOSE UR STRIP-MCNC: NEGATIVE MG/DL
HCT VFR BLD AUTO: 32.6 % (ref 35–47)
HGB BLD-MCNC: 11 G/DL (ref 11.7–15.7)
HGB UR QL STRIP: ABNORMAL
IMM GRANULOCYTES # BLD: 0 10E3/UL
IMM GRANULOCYTES NFR BLD: 0 %
KETONES UR STRIP-MCNC: NEGATIVE MG/DL
LEUKOCYTE ESTERASE UR QL STRIP: NEGATIVE
LYMPHOCYTES # BLD AUTO: 1.9 10E3/UL (ref 0.8–5.3)
LYMPHOCYTES NFR BLD AUTO: 19 %
MCH RBC QN AUTO: 30.3 PG (ref 26.5–33)
MCHC RBC AUTO-ENTMCNC: 33.7 G/DL (ref 31.5–36.5)
MCV RBC AUTO: 90 FL (ref 78–100)
MONOCYTES # BLD AUTO: 0.6 10E3/UL (ref 0–1.3)
MONOCYTES NFR BLD AUTO: 6 %
MUCOUS THREADS #/AREA URNS LPF: PRESENT /LPF
NEUTROPHILS # BLD AUTO: 7.5 10E3/UL (ref 1.6–8.3)
NEUTROPHILS NFR BLD AUTO: 75 %
NITRATE UR QL: NEGATIVE
NRBC # BLD AUTO: 0 10E3/UL
NRBC BLD AUTO-RTO: 0 /100
PH UR STRIP: 6.5 [PH] (ref 5–7)
PLATELET # BLD AUTO: 236 10E3/UL (ref 150–450)
POTASSIUM SERPL-SCNC: 3.7 MMOL/L (ref 3.4–5.3)
RBC # BLD AUTO: 3.63 10E6/UL (ref 3.8–5.2)
RBC URINE: 2 /HPF
SODIUM SERPL-SCNC: 137 MMOL/L (ref 135–145)
SP GR UR STRIP: 1.02 (ref 1–1.03)
SQUAMOUS EPITHELIAL: 4 /HPF
UROBILINOGEN UR STRIP-MCNC: NORMAL MG/DL
WBC # BLD AUTO: 10.1 10E3/UL (ref 4–11)
WBC URINE: 0 /HPF

## 2024-04-16 PROCEDURE — 99285 EMERGENCY DEPT VISIT HI MDM: CPT | Mod: 25

## 2024-04-16 PROCEDURE — 250N000011 HC RX IP 250 OP 636: Performed by: EMERGENCY MEDICINE

## 2024-04-16 PROCEDURE — 80048 BASIC METABOLIC PNL TOTAL CA: CPT | Performed by: EMERGENCY MEDICINE

## 2024-04-16 PROCEDURE — 74177 CT ABD & PELVIS W/CONTRAST: CPT

## 2024-04-16 PROCEDURE — 96374 THER/PROPH/DIAG INJ IV PUSH: CPT | Mod: 59

## 2024-04-16 PROCEDURE — 250N000009 HC RX 250: Performed by: EMERGENCY MEDICINE

## 2024-04-16 PROCEDURE — 81003 URINALYSIS AUTO W/O SCOPE: CPT | Performed by: EMERGENCY MEDICINE

## 2024-04-16 PROCEDURE — 250N000013 HC RX MED GY IP 250 OP 250 PS 637: Performed by: EMERGENCY MEDICINE

## 2024-04-16 PROCEDURE — 85048 AUTOMATED LEUKOCYTE COUNT: CPT | Performed by: EMERGENCY MEDICINE

## 2024-04-16 PROCEDURE — 36415 COLL VENOUS BLD VENIPUNCTURE: CPT | Performed by: EMERGENCY MEDICINE

## 2024-04-16 RX ORDER — DIPHENHYDRAMINE HYDROCHLORIDE 50 MG/ML
50 INJECTION INTRAMUSCULAR; INTRAVENOUS ONCE
Status: COMPLETED | OUTPATIENT
Start: 2024-04-16 | End: 2024-04-16

## 2024-04-16 RX ORDER — OXYCODONE HYDROCHLORIDE 5 MG/1
5 TABLET ORAL ONCE
Status: COMPLETED | OUTPATIENT
Start: 2024-04-16 | End: 2024-04-16

## 2024-04-16 RX ORDER — IOPAMIDOL 755 MG/ML
75 INJECTION, SOLUTION INTRAVASCULAR ONCE
Status: COMPLETED | OUTPATIENT
Start: 2024-04-16 | End: 2024-04-16

## 2024-04-16 RX ADMIN — OXYCODONE HYDROCHLORIDE 5 MG: 5 TABLET ORAL at 10:36

## 2024-04-16 RX ADMIN — SODIUM CHLORIDE 62 ML: 9 INJECTION, SOLUTION INTRAVENOUS at 09:28

## 2024-04-16 RX ADMIN — DIPHENHYDRAMINE HYDROCHLORIDE 50 MG: 50 INJECTION, SOLUTION INTRAMUSCULAR; INTRAVENOUS at 09:01

## 2024-04-16 RX ADMIN — IOPAMIDOL 75 ML: 755 INJECTION, SOLUTION INTRAVENOUS at 09:29

## 2024-04-16 ASSESSMENT — ACTIVITIES OF DAILY LIVING (ADL)
ADLS_ACUITY_SCORE: 36

## 2024-04-16 NOTE — ED TRIAGE NOTES
Pt had 5th lap procedure for endometriosis and has had unmanageable pain/nausea since yesterday. VSS en route. . 2.5mg droperidol en route for nausea.

## 2024-04-16 NOTE — ED NOTES
Bed: ED06  Expected date:   Expected time:   Means of arrival:   Comments:  445  34 F abd pain after laparoscopic surgery yesterday  0747

## 2024-04-16 NOTE — ED PROVIDER NOTES
History     Chief Complaint:  Abdominal Pain and Nausea        The history is provided by the patient.      Do Hassan is a 34 year old female,1 day s/p hysterectomy, who presents with abdominal pain and nausea. Her pain was initially tolerable after the surgery, but it started to worsen last night. She continues to have the pain since then along with some nausea. She notes that she has not had anything to eat today due to the nausea. She eventually spoke with the Surgeon's office, and was advised to go to be seen. She was brought her by EMS, and was given 2.5 mg of Droperidol for the nausea. Her blood glucose was 145 per the EMS report. She took three 5 mg Oxycodone prior to arrival with no relief. Another symptom mentioned at bedside was some vaginal spotting, but she denies having heavy bleeding. She further denies having a fever. She notes that this is her 5th Laparoscopic surgery.     Independent Historian:   None - Patient Only    Review of External Notes:   Reviewed patient's office visit on 4/10/2024, patient prescribed oxycodone 5 mg, twice a day for 10 days.    Medications:    Aspirin   Bupropion    Cetirizine   Fluoxetine    Fluticasone   Hydroxyzine    Prochlorperazine   Trazodone    Zofran   Oxycodone   Flexeril     Past Medical History:    Anxiety  Arthritis  Depression  Óscar-Danlos syndrome  Endometriosis  Heartburn  Intussusception (H)  May-Thurner syndrome  Osteoporosis  POTS (postural orthostatic tachycardia syndrome)  Spina bifida occulta  Substance abuse (H)  DDD (degenerative disc disease), lumbar  Recurrent major depressive disorder, in partial remission (H24)  Asthma    Past Surgical History:    Lumbar anterior posterior spinal fusion   Appendectomy   L ovarian cystectomy   Endometriosis excision w/ lysis of adhesions   R hip labial repair   Unspecified Labrum repair     Physical Exam   Patient Vitals for the past 24 hrs:   BP Temp Temp src Pulse Resp SpO2 Height Weight   04/16/24  "0845 -- -- -- -- -- 98 % -- --   04/16/24 0818 -- -- -- -- -- 97 % -- --   04/16/24 0817 128/82 98.2  F (36.8  C) Temporal 75 14 (!) 75 % 1.6 m (5' 3\") 68 kg (150 lb)        Physical Exam  General: Well-nourished, resting comfortably when I enter the room  Eyes: Pupils equal, conjunctivae pink no scleral icterus or conjunctival injection  ENT:  Moist mucus membranes  Respiratory:  Lungs clear to auscultation bilaterally, no crackles/rubs/wheezes.  Good air movement  CV: Normal rate and rhythm, no murmurs  GI:  Abdomen soft and non-distended.  No guarding or rebound.  Tenderness to palpation in the left lower quadrant.  Skin: Warm, dry.  No rashes or petechiae.  Surgical incision at the umbilicus, no surrounding redness, swelling, pain with palpation.  Musculoskeletal: No peripheral edema or calf tenderness  Neuro: Alert and oriented to person/place/time  Psychiatric: Normal affect      Emergency Department Course   Imaging:  CT Abdomen Pelvis w Contrast   Final Result   IMPRESSION:    1.  Small to moderate pneumoperitoneum, presumably from recent   surgery. No additional signs to suggest viscus perforation. Clinical   correlation is recommended.   2.  Hysterectomy with trace gas and stranding along the vaginal cuff.   No drainable organized fluid collection.   3.  No evidence of bowel obstruction.   4.  Mild periumbilical skin thickening, likely from recent surgery.   Correlate for any signs of cellulitis.      BRADLEY HOLLAND MD            SYSTEM ID:  [id number]         Results per radiology     Laboratory:  Labs Ordered and Resulted from Time of ED Arrival to Time of ED Departure   BASIC METABOLIC PANEL - Abnormal       Result Value    Sodium 137      Potassium 3.7      Chloride 103      Carbon Dioxide (CO2) 22      Anion Gap 12      Urea Nitrogen 4.2 (*)     Creatinine 0.83      GFR Estimate >90      Calcium 8.9      Glucose 114 (*)    ROUTINE UA WITH MICROSCOPIC REFLEX TO CULTURE - Abnormal    Color Urine " Light Yellow      Appearance Urine Clear      Glucose Urine Negative      Bilirubin Urine Negative      Ketones Urine Negative      Specific Gravity Urine 1.016      Blood Urine Small (*)     pH Urine 6.5      Protein Albumin Urine Negative      Urobilinogen Urine Normal      Nitrite Urine Negative      Leukocyte Esterase Urine Negative      Mucus Urine Present (*)     RBC Urine 2      WBC Urine 0      Squamous Epithelials Urine 4 (*)    CBC WITH PLATELETS AND DIFFERENTIAL - Abnormal    WBC Count 10.1      RBC Count 3.63 (*)     Hemoglobin 11.0 (*)     Hematocrit 32.6 (*)     MCV 90      MCH 30.3      MCHC 33.7      RDW 11.9      Platelet Count 236      % Neutrophils 75      % Lymphocytes 19      % Monocytes 6      % Eosinophils 0      % Basophils 0      % Immature Granulocytes 0      NRBCs per 100 WBC 0      Absolute Neutrophils 7.5      Absolute Lymphocytes 1.9      Absolute Monocytes 0.6      Absolute Eosinophils 0.0      Absolute Basophils 0.0      Absolute Immature Granulocytes 0.0      Absolute NRBCs 0.0          Emergency Department Course & Assessments:    Interventions:  Medications   diphenhydrAMINE (BENADRYL) injection 50 mg (50 mg Intravenous $Given 24 0901)   iopamidol (ISOVUE-370) solution 75 mL (75 mLs Intravenous $Given 24 0929)   sodium chloride 0.9 % bag 100mL (62 mLs Intravenous $Given 24 0928)   oxyCODONE (ROXICODONE) tablet 5 mg (5 mg Oral $Given 24 1036)      Assessments:  0824 I obtained history and examined the patient as noted above.  1018 I rechecked the patient and updated.  1030 I rechecked the patient and explained findings.    Independent Interpretation (X-rays, CTs, rhythm strip):      Consultations/Discussion of Management or Tests:  1024 I spoke with DARIUSZ Mario.     Social Determinants of Health affecting care:   None    Disposition:  The patient was discharged.     Impression & Plan    CMS Diagnoses: None    Medical Decision Makin-year-old female  presents emergency department with a complaint of lower abdominal pain.  Patient reports that she had a hysterectomy yesterday.  They took out her uterus and her single ovary.  She states that the pain has been uncontrollable.  She has taken 15 mg of oxycodone since his surgery without any relief.  She has not had any fevers, nausea, vomiting.  She has had some vaginal spotting.  On exam patient's left lower quadrant is tender to palpation.  Her surgical incision does not show any signs of cellulitis.  CT scan shows mild pneumoperitoneum which would be expected from the surgery.  No sign of abscess or bowel obstruction.  No sign of free fluid.  I did call and speak with Dr. Zaldivar with OB/GYN.  Patient does not need any admission or any urgent surgical intervention.  On reevaluation, the patient's pain is much more controlled after the Benadryl and droperidol.  I will give her a dose of oxycodone before she leaves the ED.  She will follow-up with her pain management specialist as well as Dr. Zaldivar.  Patient feels comfortable with this plan.  She is discharged home.      Diagnosis:    ICD-10-CM    1. Acute postoperative abdominal pain  G89.18     R10.9            Discharge Medications:  New Prescriptions    No medications on file        Scribe Disclosure:  I, Aris Mcdowell, am serving as a scribe at 8:32 AM on 4/16/2024 to document services personally performed by Nat Roth MD based on my observations and the provider's statements to me.   4/16/2024   Nat Roth MD Richardson, Elizabeth, MD  04/16/24 7141

## 2024-04-16 NOTE — DISCHARGE INSTRUCTIONS
Please follow-up with your pain management specialist this week.    Discharge Instructions  Abdominal Pain    Abdominal pain (belly pain) can be caused by many things. Your evaluation today does not show the exact cause for your pain. Your provider today has decided that it is unlikely your pain is due to a life threatening problem, or a problem requiring surgery or hospital admission. Sometimes those problems cannot be found right away, so it is very important that you follow up as directed.  Sometimes only the changes which occur over time allow the cause of your pain to be found.    Generally, every Emergency Department visit should have a follow-up clinic visit with either a primary or a specialty clinic/provider. Please follow-up as instructed by your emergency provider today. With abdominal pain, we often recommend very close follow-up, such as the following day.    ADULTS:  Return to the Emergency Department right away if:    You get an oral temperature above 102oF or as directed by your provider.  You have blood in your stools. This may be bright red or appear as black, tarry stools.    You keep vomiting (throwing up) or cannot drink liquids.  You see blood when you vomit.   You cannot have a bowel movement or you cannot pass gas.  Your stomach gets bloated or bigger.  Your skin or the whites of your eyes look yellow.  You faint.  You have bloody, frequent or painful urination (peeing).  You have new symptoms or anything that worries you.    CHILDREN:  Return to the Emergency Department right away if your child has any of the above-listed symptoms or the following:    Pushes your hand away or screams/cries when his/her belly is touched.  You notice your child is very fussy or weak.  Your child is very tired and is too tired to eat or drink.  Your child is dehydrated.  Signs of dehydration can be:  Significant change in the amount of wet diapers/urine.  Your infant or child starts to have dry mouth and lips,  or no saliva (spit) or tears.    PREGNANT WOMEN:  Return to the Emergency Department right away if you have any of the above-listed symptoms or the following:    You have bleeding, leaking fluid or passing tissue from the vagina.  You have worse pain or cramping, or pain in your shoulder or back.  You have vomiting that will not stop.  You have a temperature of 100oF or more.  Your baby is not moving as much as usual.  You faint.  You get a bad headache with or without eye problems and abdominal pain.  You have a seizure.  You have unusual discharge from your vagina and abdominal pain.    Abdominal pain is pretty common during pregnancy.  Your pain may or may not be related to your pregnancy. You should follow-up closely with your OB provider so they can evaluate you and your baby.  Until you follow-up with your regular provider, do the following:     Avoid sex and do not put anything in your vagina.  Drink clear fluids.  Only take medications approved by your provider.    MORE INFORMATION:    Appendicitis:  A possible cause of abdominal pain in any person who still has their appendix is acute appendicitis. Appendicitis is often hard to diagnose.  Testing does not always rule out early appendicitis or other causes of abdominal pain. Close follow-up with your provider and re-evaluations may be needed to figure out the reason for your abdominal pain.    Follow-up:  It is very important that you make an appointment with your clinic and go to the appointment.  If you do not follow-up with your primary provider, it may result in missing an important development which could result in permanent injury or disability and/or lasting pain.  If there is any problem keeping your appointment, call your provider or return to the Emergency Department.    Medications:  Take your medications as directed by your provider today.  Before using over-the-counter medications, ask your provider and make sure to take the medications as  "directed.  If you have any questions about medications, ask your provider.    Diet:  Resume your normal diet as much as possible, but do not eat fried, fatty or spicy foods while you have pain.  Do not drink alcohol or have caffeine.  Do not smoke tobacco.    Probiotics: If you have been given an antibiotic, you may want to also take a probiotic pill or eat yogurt with live cultures. Probiotics have \"good bacteria\" to help your intestines stay healthy. Studies have shown that probiotics help prevent diarrhea (loose stools) and other intestine problems (including C. diff infection) when you take antibiotics. You can buy these without a prescription in the pharmacy section of the store.     If you were given a prescription for medicine here today, be sure to read all of the information (including the package insert) that comes with your prescription.  This will include important information about the medicine, its side effects, and any warnings that you need to know about.  The pharmacist who fills the prescription can provide more information and answer questions you may have about the medicine.  If you have questions or concerns that the pharmacist cannot address, please call or return to the Emergency Department.       Remember that you can always come back to the Emergency Department if you are not able to see your regular provider in the amount of time listed above, if you get any new symptoms, or if there is anything that worries you.    "

## 2024-04-21 ENCOUNTER — HOSPITAL ENCOUNTER (EMERGENCY)
Facility: CLINIC | Age: 34
Discharge: HOME | End: 2024-04-21
Attending: EMERGENCY MEDICINE | Admitting: EMERGENCY MEDICINE
Payer: COMMERCIAL

## 2024-04-21 VITALS
SYSTOLIC BLOOD PRESSURE: 123 MMHG | HEART RATE: 93 BPM | TEMPERATURE: 97.9 F | OXYGEN SATURATION: 95 % | DIASTOLIC BLOOD PRESSURE: 67 MMHG | RESPIRATION RATE: 16 BRPM

## 2024-04-21 DIAGNOSIS — K59.00 CONSTIPATION, UNSPECIFIED CONSTIPATION TYPE: ICD-10-CM

## 2024-04-21 DIAGNOSIS — R11.2 NAUSEA AND VOMITING, UNSPECIFIED VOMITING TYPE: ICD-10-CM

## 2024-04-21 DIAGNOSIS — G89.18 POST-OP PAIN: ICD-10-CM

## 2024-04-21 LAB
ALBUMIN SERPL BCG-MCNC: 4.2 G/DL (ref 3.5–5.2)
ALP SERPL-CCNC: 43 U/L (ref 40–150)
ALT SERPL W P-5'-P-CCNC: 15 U/L (ref 0–50)
ANION GAP SERPL CALCULATED.3IONS-SCNC: 10 MMOL/L (ref 7–15)
AST SERPL W P-5'-P-CCNC: 16 U/L (ref 0–45)
BASOPHILS # BLD AUTO: 0 10E3/UL (ref 0–0.2)
BASOPHILS NFR BLD AUTO: 1 %
BILIRUB SERPL-MCNC: 0.2 MG/DL
BUN SERPL-MCNC: 5.2 MG/DL (ref 6–20)
CALCIUM SERPL-MCNC: 9.2 MG/DL (ref 8.6–10)
CHLORIDE SERPL-SCNC: 104 MMOL/L (ref 98–107)
CREAT SERPL-MCNC: 0.81 MG/DL (ref 0.51–0.95)
DEPRECATED HCO3 PLAS-SCNC: 25 MMOL/L (ref 22–29)
EGFRCR SERPLBLD CKD-EPI 2021: >90 ML/MIN/1.73M2
EOSINOPHIL # BLD AUTO: 0.4 10E3/UL (ref 0–0.7)
EOSINOPHIL NFR BLD AUTO: 5 %
ERYTHROCYTE [DISTWIDTH] IN BLOOD BY AUTOMATED COUNT: 11.8 % (ref 10–15)
GLUCOSE SERPL-MCNC: 100 MG/DL (ref 70–99)
HCT VFR BLD AUTO: 36.9 % (ref 35–47)
HGB BLD-MCNC: 12.4 G/DL (ref 11.7–15.7)
IMM GRANULOCYTES # BLD: 0 10E3/UL
IMM GRANULOCYTES NFR BLD: 0 %
LYMPHOCYTES # BLD AUTO: 1.7 10E3/UL (ref 0.8–5.3)
LYMPHOCYTES NFR BLD AUTO: 23 %
MCH RBC QN AUTO: 30 PG (ref 26.5–33)
MCHC RBC AUTO-ENTMCNC: 33.6 G/DL (ref 31.5–36.5)
MCV RBC AUTO: 89 FL (ref 78–100)
MONOCYTES # BLD AUTO: 0.4 10E3/UL (ref 0–1.3)
MONOCYTES NFR BLD AUTO: 5 %
NEUTROPHILS # BLD AUTO: 4.9 10E3/UL (ref 1.6–8.3)
NEUTROPHILS NFR BLD AUTO: 66 %
NRBC # BLD AUTO: 0 10E3/UL
NRBC BLD AUTO-RTO: 0 /100
PLATELET # BLD AUTO: 296 10E3/UL (ref 150–450)
POTASSIUM SERPL-SCNC: 4.1 MMOL/L (ref 3.4–5.3)
PROT SERPL-MCNC: 7.4 G/DL (ref 6.4–8.3)
RBC # BLD AUTO: 4.13 10E6/UL (ref 3.8–5.2)
SODIUM SERPL-SCNC: 139 MMOL/L (ref 135–145)
WBC # BLD AUTO: 7.4 10E3/UL (ref 4–11)

## 2024-04-21 PROCEDURE — 80053 COMPREHEN METABOLIC PANEL: CPT | Performed by: EMERGENCY MEDICINE

## 2024-04-21 PROCEDURE — 96374 THER/PROPH/DIAG INJ IV PUSH: CPT

## 2024-04-21 PROCEDURE — 85025 COMPLETE CBC W/AUTO DIFF WBC: CPT | Performed by: EMERGENCY MEDICINE

## 2024-04-21 PROCEDURE — 36415 COLL VENOUS BLD VENIPUNCTURE: CPT | Performed by: EMERGENCY MEDICINE

## 2024-04-21 PROCEDURE — 96361 HYDRATE IV INFUSION ADD-ON: CPT

## 2024-04-21 PROCEDURE — 99284 EMERGENCY DEPT VISIT MOD MDM: CPT | Mod: 25

## 2024-04-21 PROCEDURE — 250N000011 HC RX IP 250 OP 636: Performed by: EMERGENCY MEDICINE

## 2024-04-21 PROCEDURE — 250N000013 HC RX MED GY IP 250 OP 250 PS 637: Performed by: EMERGENCY MEDICINE

## 2024-04-21 PROCEDURE — 258N000003 HC RX IP 258 OP 636: Performed by: EMERGENCY MEDICINE

## 2024-04-21 RX ORDER — ONDANSETRON 2 MG/ML
4 INJECTION INTRAMUSCULAR; INTRAVENOUS ONCE
Status: COMPLETED | OUTPATIENT
Start: 2024-04-21 | End: 2024-04-21

## 2024-04-21 RX ORDER — ACETAMINOPHEN 500 MG
1000 TABLET ORAL ONCE
Status: COMPLETED | OUTPATIENT
Start: 2024-04-21 | End: 2024-04-21

## 2024-04-21 RX ADMIN — ONDANSETRON 4 MG: 2 INJECTION INTRAMUSCULAR; INTRAVENOUS at 18:15

## 2024-04-21 RX ADMIN — SODIUM CHLORIDE 1000 ML: 9 INJECTION, SOLUTION INTRAVENOUS at 18:13

## 2024-04-21 RX ADMIN — ACETAMINOPHEN 1000 MG: 500 TABLET, FILM COATED ORAL at 18:59

## 2024-04-21 ASSESSMENT — ACTIVITIES OF DAILY LIVING (ADL)
ADLS_ACUITY_SCORE: 36
ADLS_ACUITY_SCORE: 36

## 2024-04-21 NOTE — ED TRIAGE NOTES
Monday had philip, laparoscopic, multiple abd surgeries in past, has moved bowels, a lot of blodd, vomiting, doesn't feel like she has with prior surgeries

## 2024-04-21 NOTE — ED PROVIDER NOTES
History     Chief Complaint:  Post-op Problem       The history is provided by the patient.      Do Hassan is a 34 year old female with a history of bowel obstructions presenting to the ED for evaluation of post-op pain and vomiting. The patient reports that she had a hysterectomy six days ago at the [facility], and she came to the ED for post-op pain. She has an extensive history of abdominal surgeries, and says her current symptoms are not normal. Usually by this time post-op, she has normal bowel movements, normal flatulence, and no vomiting. She is currently experiencing lower back pain, abdominal pain and cramping. She has been taking Tylenol. She is having bowel movements, but they are soft, and she has bright red rectal bleeding every time she has a bowel movement. She has also been vomiting after eating and drinking. She feels slightly better after getting Zofran. She denies fever. The patient does have a medicinal marijuana prescription, but says she has never had associated nausea. She has not eaten much today.     Independent Historian:   None - Patient Only    Review of External Notes:   None    Medications:    Aspirin  Wellbutrin XL  Zyrtec  Prozac  Atarax  Prilosec  Compazine  Senna  Desyrel  Prilosec  Robaxin    Past Medical History:    Anxiety   Arthritis  Depression  Óscar-Danlos syndrome  Endometriosis  Heartburn  Intussusception  May-Thurner syndrome  Osteoporosis  POTS  Spina bifida occulta  Substance abuse  DDD  Scoliosis  Asthma    Past Surgical History:    Anterior posterior spinal fusion  Appendectomy  Cystectomy  Endometriosis excision  Diagnostic laparoscopy  Right hip labial repair    Physical Exam   Patient Vitals for the past 24 hrs:   BP Temp Temp src Pulse Resp SpO2   04/21/24 1953 -- -- -- -- -- 95 %   04/21/24 1952 123/67 -- -- 93 -- --   04/21/24 1804 117/75 -- -- -- -- --   04/21/24 1801 -- 97.9  F (36.6  C) Temporal 103 16 100 %      Physical  Exam    General: Alert and cooperative with exam. Patient in mild distress. Anxious appearance. Normal mentation.   Head:  Scalp is NC/AT  Eyes:  No scleral icterus, PERRL  ENT:  The external nose and ears are normal. The oropharynx is normal and without erythema; mucus membranes are moist. Uvula midline, no evidence of deep space infection.  Neck:  Normal range of motion without rigidity.  CV:  Regular rate and rhythm    No pathologic murmur   Resp:  Breath sounds are clear bilaterally    Non-labored, no retractions or accessory muscle use  GI:  Abdomen is soft, no distension, no tenderness. No peritoneal signs  MS:  No lower extremity edema   Skin:  Warm and dry, No rash or lesions noted.  Neuro: Oriented x 3. No gross motor deficits.  Rectal:  Small, non bleeding external hemorrhoid. SUSHANT with no evidence of bleeding.     Emergency Department Course     Laboratory:  Labs Ordered and Resulted from Time of ED Arrival to Time of ED Departure   COMPREHENSIVE METABOLIC PANEL - Abnormal       Result Value    Sodium 139      Potassium 4.1      Carbon Dioxide (CO2) 25      Anion Gap 10      Urea Nitrogen 5.2 (*)     Creatinine 0.81      GFR Estimate >90      Calcium 9.2      Chloride 104      Glucose 100 (*)     Alkaline Phosphatase 43      AST 16      ALT 15      Protein Total 7.4      Albumin 4.2      Bilirubin Total 0.2     CBC WITH PLATELETS AND DIFFERENTIAL    WBC Count 7.4      RBC Count 4.13      Hemoglobin 12.4      Hematocrit 36.9      MCV 89      MCH 30.0      MCHC 33.6      RDW 11.8      Platelet Count 296      % Neutrophils 66      % Lymphocytes 23      % Monocytes 5      % Eosinophils 5      % Basophils 1      % Immature Granulocytes 0      NRBCs per 100 WBC 0      Absolute Neutrophils 4.9      Absolute Lymphocytes 1.7      Absolute Monocytes 0.4      Absolute Eosinophils 0.4      Absolute Basophils 0.0      Absolute Immature Granulocytes 0.0      Absolute NRBCs 0.0          Procedures   None    Emergency  Department Course & Assessments:    Interventions:  Medications   ondansetron (ZOFRAN) injection 4 mg (4 mg Intravenous $Given 4/21/24 1815)   sodium chloride 0.9% BOLUS 1,000 mL (0 mLs Intravenous Stopped 4/21/24 1949)   acetaminophen (TYLENOL) tablet 1,000 mg (1,000 mg Oral $Given 4/21/24 1859)      Independent Interpretation (X-rays, CTs, rhythm strip):  None    Assessments/Consultations/Discussion of Management or Tests:   ED Course as of 04/21/24 2017   Sun Apr 21, 2024   1841 I obtained history and examined the patient as noted above.       Social Determinants of Health affecting care:   None    Disposition:  The patient was discharged.     Impression & Plan    CMS Diagnoses: None     Medical Decision Making:  Do Hassan is a 34 year old female who presents for evaluation for decreased stool output postoperatively (recent hysterectomy); reports history of bowel obstruction and blood in stool. Signs and symptoms are consistent with constipation.  Patient had several bowel movements during her ED course and notes improvement.  Rectal exam without evidence of bleeding.  Labs unremarkable as above including normal hemoglobin.  There are no signs at this point for a need for rectal disimpaction.  There are no signs of obstruction nor other intraabdominal catastrophe.   There are no indications for advanced imaging or admission.  Discussed continued management of postoperative pain and constipation.  Return precautions discussed.  Patient discharged home.    Diagnosis:    ICD-10-CM    1. Nausea and vomiting, unspecified vomiting type  R11.2       2. Constipation, unspecified constipation type  K59.00       3. Post-op pain  G89.18          Scribe Disclosure:  I, Joanna Woodson, am serving as a scribe at 6:39 PM on 4/21/2024 to document services personally performed by Maurizio Degroot DO based on my observations and the provider's statements to me.     4/21/2024   Maurizio Degroot  Maurizio Mcclendon,   04/22/24 1054

## 2024-04-22 NOTE — DISCHARGE INSTRUCTIONS
Return to the emergency department or seek medical care as instructed if your symptoms fail to improve or significantly worsen.    Take zofran as need for nausea; use as directed.    Continue MiraLAX and senna as needed for constipation    Limit oxycodone usage    Follow-up as indicated on page 1.  Maintain adequate hydration and get plenty of rest.

## 2024-06-30 ENCOUNTER — OFFICE VISIT (OUTPATIENT)
Dept: URGENT CARE | Facility: URGENT CARE | Age: 34
End: 2024-06-30
Payer: COMMERCIAL

## 2024-06-30 VITALS
SYSTOLIC BLOOD PRESSURE: 118 MMHG | HEART RATE: 118 BPM | WEIGHT: 150 LBS | DIASTOLIC BLOOD PRESSURE: 80 MMHG | HEIGHT: 63 IN | OXYGEN SATURATION: 97 % | BODY MASS INDEX: 26.58 KG/M2 | TEMPERATURE: 97.1 F | RESPIRATION RATE: 16 BRPM

## 2024-06-30 DIAGNOSIS — M54.6 ACUTE BILATERAL THORACIC BACK PAIN: ICD-10-CM
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
## 2024-06-30 DIAGNOSIS — S13.4XXA WHIPLASH INJURY TO NECK, INITIAL ENCOUNTER: ICD-10-CM

## 2024-06-30 PROCEDURE — 99213 OFFICE O/P EST LOW 20 MIN: CPT | Performed by: PHYSICIAN ASSISTANT
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
## 2024-07-09 NOTE — PATIENT PROFILE OB - NS PRO ABUSE SCREEN AFRAID ANYONE YN
SUBJECTIVE:    Patient ID: Jose Alejandro Nichole is a 65 y.o. male.    Chief Complaint   Patient presents with    Toe Pain     Diabetic ulcers on both feet       HPI: office visit    He is in the office today in follow-up of his diabetic foot ulcers.  Now he is has a place on the toes on the right foot.  He has gotten an abrasion when he tripped and almost fell on some steps.  He is concerned about his weight.  He feels like his weight is really causing him a lot of complications.  He is trying to watch what he eats.  His pain curtailed him from being very active at all.  He has not had any chest pain.  He denies any shortness of breath.  His blood pressure seem to be doing well.  He has not had any other medication issues  Review of Systems   Constitutional:  Positive for fatigue.   Musculoskeletal:  Positive for arthralgias, back pain, gait problem, joint swelling and myalgias.   Skin:         Leg swelling/infections   Neurological:  Positive for weakness.   Psychiatric/Behavioral:  The patient is nervous/anxious.    All other systems reviewed and are negative.       OBJECTIVE:  /68   Pulse (!) 103   Temp 98 °F (36.7 °C) (Infrared)   Resp 16   Ht 1.93 m (6' 4\")   Wt (!) 211.4 kg (466 lb)   SpO2 96% Comment: ra  BMI 56.72 kg/m²    Wt Readings from Last 3 Encounters:   07/09/24 (!) 211.4 kg (466 lb)   07/01/24 (!) 209.1 kg (461 lb)   06/27/24 (!) 209.2 kg (461 lb 3.2 oz)     BP Readings from Last 3 Encounters:   07/09/24 132/68   07/01/24 132/70   06/27/24 124/72      Pulse Readings from Last 3 Encounters:   07/09/24 (!) 103   07/01/24 96   06/27/24 (!) 105     Body mass index is 56.72 kg/m².   Resp Readings from Last 3 Encounters:   07/09/24 16   07/01/24 16   06/27/24 16     Past medical, surgical, family and social history were reviewed and updated with the patient.     Physical Exam  Vitals and nursing note reviewed.   Constitutional:       Appearance: Normal appearance. He is well-developed.   HENT:  no

## 2024-07-14 ENCOUNTER — HEALTH MAINTENANCE LETTER (OUTPATIENT)
Age: 34
End: 2024-07-14

## 2025-07-19 ENCOUNTER — HEALTH MAINTENANCE LETTER (OUTPATIENT)
Age: 35
End: 2025-07-19

## (undated) DEVICE — GLOVE PROTEXIS POWDER FREE SMT 8.0  2D72PT80X

## (undated) DEVICE — GLOVE PROTEXIS POWDER FREE SMT 6.5  2D72PT65X

## (undated) DEVICE — LINEN HALF SHEET 5512

## (undated) DEVICE — DRSG GAUZE 4X4" 8044

## (undated) DEVICE — GLOVE PROTEXIS BLUE W/NEU-THERA 6.5  2D73EB65

## (undated) DEVICE — LINEN POUCH DBL 5427

## (undated) DEVICE — PAD CHUX UNDERPAD 30X36" P3036C

## (undated) DEVICE — DRAPE MAYO STAND 23X54 8337

## (undated) DEVICE — ESU GROUND PAD ADULT W/CORD E7507

## (undated) DEVICE — SU MONOCRYL 4-0 PS-2 27" UND Y426H

## (undated) DEVICE — ESU CORD MONOPOLAR 10'  E0510

## (undated) DEVICE — PACK GYN LAPAROSCOPY RIDGES

## (undated) DEVICE — NDL INSUFFLATION 13GA 120MM C2201

## (undated) DEVICE — DRSG STERI STRIP 1/2X4" R1547

## (undated) DEVICE — NDL 22GA 1.5"

## (undated) DEVICE — LINEN FULL SHEET 5511

## (undated) DEVICE — PREP CHLORHEXIDINE 4% 4OZ (HIBICLENS) 57504

## (undated) DEVICE — ENDO TROCAR FIRST ENTRY KII FIOS Z-THRD 05X100MM CTF03

## (undated) DEVICE — BAG CLEAR TRASH 1.3M 39X33" P4040C

## (undated) DEVICE — ENDO TROCAR SLEEVE KII Z-THREADED 05X100MM CTS02

## (undated) DEVICE — SOL NACL 0.9% IRRIG 1000ML BOTTLE 2F7124

## (undated) RX ORDER — PROPOFOL 10 MG/ML
INJECTION, EMULSION INTRAVENOUS
Status: DISPENSED
Start: 2022-05-10

## (undated) RX ORDER — CEFAZOLIN SODIUM/WATER 2 G/20 ML
SYRINGE (ML) INTRAVENOUS
Status: DISPENSED
Start: 2022-05-10

## (undated) RX ORDER — BUPIVACAINE HYDROCHLORIDE 5 MG/ML
INJECTION, SOLUTION EPIDURAL; INTRACAUDAL
Status: DISPENSED
Start: 2022-05-10

## (undated) RX ORDER — ACETAMINOPHEN 325 MG/1
TABLET ORAL
Status: DISPENSED
Start: 2022-05-10

## (undated) RX ORDER — ONDANSETRON 2 MG/ML
INJECTION INTRAMUSCULAR; INTRAVENOUS
Status: DISPENSED
Start: 2022-05-10

## (undated) RX ORDER — OXYCODONE HYDROCHLORIDE 5 MG/1
TABLET ORAL
Status: DISPENSED
Start: 2022-05-10

## (undated) RX ORDER — FENTANYL CITRATE 50 UG/ML
INJECTION, SOLUTION INTRAMUSCULAR; INTRAVENOUS
Status: DISPENSED
Start: 2022-05-10

## (undated) RX ORDER — KETOROLAC TROMETHAMINE 30 MG/ML
INJECTION, SOLUTION INTRAMUSCULAR; INTRAVENOUS
Status: DISPENSED
Start: 2022-05-10